# Patient Record
Sex: FEMALE | Race: WHITE | Employment: UNEMPLOYED | URBAN - METROPOLITAN AREA
[De-identification: names, ages, dates, MRNs, and addresses within clinical notes are randomized per-mention and may not be internally consistent; named-entity substitution may affect disease eponyms.]

---
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
## 2022-07-11 ENCOUNTER — TELEPHONE (OUTPATIENT)
Dept: SURGERY | Age: 22
End: 2022-07-11

## 2022-07-11 ENCOUNTER — VIRTUAL VISIT (OUTPATIENT)
Dept: FAMILY MEDICINE CLINIC | Age: 22
End: 2022-07-11
Payer: OTHER GOVERNMENT

## 2022-07-11 DIAGNOSIS — M26.623 BILATERAL TEMPOROMANDIBULAR JOINT PAIN: Primary | ICD-10-CM

## 2022-07-11 DIAGNOSIS — N94.6 DYSMENORRHEA: ICD-10-CM

## 2022-07-11 DIAGNOSIS — Z80.3 FAMILY HISTORY OF BREAST CANCER: ICD-10-CM

## 2022-07-11 PROCEDURE — 99214 OFFICE O/P EST MOD 30 MIN: CPT | Performed by: NURSE PRACTITIONER

## 2022-07-11 RX ORDER — LEVONORGESTREL AND ETHINYL ESTRADIOL 0.1-0.02MG
1 KIT ORAL DAILY
Qty: 3 DOSE PACK | Refills: 1 | Status: SHIPPED | OUTPATIENT
Start: 2022-07-11 | End: 2022-09-09 | Stop reason: SDUPTHER

## 2022-07-11 RX ORDER — IBUPROFEN 800 MG/1
800 TABLET ORAL
Qty: 45 TABLET | Refills: 1 | Status: SHIPPED | OUTPATIENT
Start: 2022-07-11

## 2022-07-11 NOTE — PATIENT INSTRUCTIONS
Temporomandibular Disorder: Care Instructions  Overview     Temporomandibular disorders (TMDs) are problems with the muscles and joints that connect your jaw to your skull. The disorders cause pain when you talk, chew, swallow, or yawn. You may feel this pain on one or both sides. TMDs are often caused by tight jaw muscles. The tightness can be caused by clenching or grinding your teeth. This may happen when you have a lot of stress in your life. If you lower your stress, you may be able to stop clenching or grinding your teeth. This will help relax your jaw and reduce your pain. Your doctor may suggest a dental splint. Splints can help reduce teeth grinding and clenching. You may also be able to do some things at home to feel better. But if none of this works, your doctor may prescribe medicine to help relax your muscles and control the pain. Follow-up care is a key part of your treatment and safety. Be sure to make and go to all appointments, and call your doctor if you are having problems. It's also a good idea to know your test results and keep a list of the medicines you take. How can you care for yourself at home? · Put either an ice pack or a warm, moist cloth on your jaw for 15 minutes several times a day. You can try switching back and forth between moist heat and cold. · Make eating easy on your jaw. Choose softer foods that are easy to chew like eggs, yogurt, or soup. Avoid hard foods that cause your jaws to work very hard. Try cutting your food into small pieces. And if your jaw gets too painful to chew, or if it locks, you may need to puree your food for a while. · To relax your jaw, repeat this exercise for a few minutes every morning and evening. Watch yourself in a mirror. Gently open and close your mouth. Move your jaw straight up and down. But don't do this if it makes your pain worse. · Manage stress. You may be clenching or tightening your muscles when you are under stress.   · Get at least 30 minutes of exercise on most days of the week to relieve stress. Walking is a good choice. · Ask your doctor if you can take an over-the-counter pain medicine, such as acetaminophen (Tylenol), ibuprofen (Advil, Motrin), or naproxen (Aleve). Be safe with medicines. Read and follow all instructions on the label. · Use good posture for sitting and standing. Slumping your shoulders disturbs the alignment of your facial bones and muscles. · Don't:  ? Hold a phone between your shoulder and your jaw. ? Open your mouth all the way, like when you sing loudly or yawn. ? Clench or grind your teeth, bite your lips, or chew your fingernails. ? Clench things such as pens, pipes, or cigars between your teeth. When should you call for help? Call your doctor now or seek immediate medical care if:    · Your jaw is locked open or shut or it is hard to move your jaw. Watch closely for changes in your health, and be sure to contact your doctor if:    · Your jaw pain gets worse.     · Your face is swollen.     · You do not get better as expected. Where can you learn more? Go to http://www.gray.com/  Enter P868 in the search box to learn more about \"Temporomandibular Disorder: Care Instructions. \"  Current as of: June 30, 2021               Content Version: 13.2  © 8233-0242 [employer]. Care instructions adapted under license by [employer] (which disclaims liability or warranty for this information). If you have questions about a medical condition or this instruction, always ask your healthcare professional. [place] [address] any warranty or liability for your use of this information.

## 2022-07-11 NOTE — PROGRESS NOTES
Perfecto Humphrey is a 23 y.o. female who was seen by synchronous (real-time) audio-video technology on 7/11/2022 for Establish Care and Medication Evaluation        Assessment & Plan:   Diagnoses and all orders for this visit:    1. Bilateral temporomandibular joint pain  Ibuprofen as needed  Schedule assessment with dentist as planned  -     ibuprofen (MOTRIN) 800 mg tablet; Take 1 Tablet by mouth every eight (8) hours as needed for Pain. 2. Dysmenorrhea  start low-dose combination oral contraceptive, counseled since we are starting this midcycle she will need to use a backup method such as a condom during intercourse for the next 3 weeks. Counseled she may also noticed increased spotting or breakthrough bleeding with midcycle start  Counseled on ACHES, need for urgent medical attention if these occur  -     levonorgestrel-ethinyl estradiol (Moustapha Heater, LESSINA) 0.1-20 mg-mcg tab; Take 1 Tablet by mouth daily. 3. Family history of breast cancer  Discussed with patient mammogram assessments are not recommended for patients under 30  Discussed she is at relatively low risk for breast cancer due to age, no first-degree relative with cancer. Patient is very concerned about her risk and would like a risk assessment. Will refer to breast specialist for this. -     REFERRAL TO BREAST SURGERY        Follow-up and Dispositions    · Return in about 3 months (around 10/11/2022), or if symptoms worsen or fail to improve, for Dysmenorrhea. I have discussed the diagnosis with the patient and the intended plan as seen in the above orders, and questions were answered concerning future plans. Patient conveyed understanding of the plan at the time of the visit. 712  Subjective:     HPI:    Presents to reestablish care and for evaluation of dysmenorrhea, TMJ pain, and family history of breast cancer. Patient is recently moved to the area from [place].     Complains of dysmenorrhea, with severe cramping during her period. She is previously been on combination oral contraceptives for this and found them to be very effective in the management of her symptoms. She is unsure of the name of what she is taken previously. She ran out of pills about 3 months ago. She is currently a non-smoker. No history of liver disease, DVT, PE, or migraine with aura. LMP 6/28/2022. Patient notes she has not had intercourse since her last period and does not plan to be sexually active in the next several weeks. Patient requesting a refill of ibuprofen 800 mg tablets prescribed by her previous PCP for TMJ pain. Notes bilateral TMJ pain but notes the right side is significantly worse than the left. She is in the process of obtaining an appointment with a dentist for further treatment. Patient reports 2 family members have recently been diagnosed with breast cancer, one being a maternal aunt, and the other a more relative. Patient requesting a mammogram to assess for cancer risk. Denies any abnormalities of the breasts such as palpable masses, changes in the skin, nipple discharge, or breast pain. No history of cancer in first-degree relatives. Prior to Admission medications    Medication Sig Start Date End Date Taking? Authorizing Provider   levonorgestrel-ethinyl estradiol (AVIANE, ALESSE, LESSINA) 0.1-20 mg-mcg tab Take 1 Tablet by mouth daily. 7/11/22  Yes Dylan Hernández NP   ibuprofen (MOTRIN) 800 mg tablet Take 1 Tablet by mouth every eight (8) hours as needed for Pain. 7/11/22  Yes Dylan Hernández NP   buPROPion SR (WELLBUTRIN SR) 150 mg SR tablet Take 150 mg by mouth daily. Yes Lalo, MD Temo   lamoTRIgine (LaMICtal) 200 mg tablet Take 1 Tab by mouth daily. 5/8/20  Yes Dylan Hernández NP   traZODone (DESYREL) 50 mg tablet Take 1 Tab by mouth nightly. Patient taking differently: Take 100 mg by mouth nightly.  5/8/20  Yes Dylan Hernández NP   escitalopram oxalate (Lexapro) 20 mg tablet Take 1 Tab by mouth daily. 5/8/20  Yes Dylan Hernández Q, NP   hydrOXYzine HCL (ATARAX) 25 mg tablet Take 1 Tab by mouth three (3) times daily as needed for Anxiety. 5/8/20  Yes Dylan Hernández, NP   ibuprofen (MOTRIN) 600 mg tablet Take 1 Tablet by mouth every six (6) hours as needed for Pain. 7/8/21 7/11/22  Priscila Winters MD   topiramate (TOPAMAX) 25 mg tablet Take 3 Tablets by mouth nightly. Patient not taking: Reported on 7/11/2022 5/27/21 7/11/22  Gold Barnes MD   diclofenac potassium (CATAFLAM) 50 mg tablet Take 1 tab at onset of headache. May take another 1 tab in 2 hrs if needed. Patient not taking: Reported on 7/11/2022 12/8/20 7/11/22  Gold Barnes MD   Lisdexamfetamine (Vyvanse) 40 mg capsule Take 1 Cap by mouth every morning. Max Daily Amount: 40 mg. Patient not taking: Reported on 7/11/2022 6/8/20 7/11/22  Shiv Lin NP   magnesium oxide 400 mg magnesium tab Take 400 mg by mouth daily. Patient not taking: Reported on 6/9/2021 5/8/20 7/11/22  Shiv Lin NP     There is no problem list on file for this patient. No Known Allergies  Past Medical History:   Diagnosis Date    ADHD     Anxiety     Anxiety     Borderline [facility] disorder ([facility]     Depression     Headache     Multiple personality disorder ([facility]     PTSD (post-traumatic stress disorder)     Reactive attachment disorder      History reviewed. No pertinent surgical history. History reviewed. No pertinent family history. Social History     Tobacco Use    Smoking status: Former Smoker    Smokeless tobacco: Never Used   Substance Use Topics    Alcohol use: Not Currently       Review of Systems   Constitutional: Negative for chills, diaphoresis, fever, malaise/fatigue and weight loss. HENT: Negative. Eyes: Negative. Respiratory: Negative. Cardiovascular: Negative. Gastrointestinal: Negative. Genitourinary: Negative. Musculoskeletal: Negative. Skin: Negative.     Neurological: Negative. Endo/Heme/Allergies: Negative. Psychiatric/Behavioral: Negative for hallucinations, substance abuse and suicidal ideas. Objective:   No flowsheet data found. General: alert, cooperative, no distress   Mental  status: normal mood, behavior, speech, dress, motor activity, and thought processes, able to follow commands   HENT: NCAT   Neck: no visualized mass   Resp: no respiratory distress   Neuro: no gross deficits   Skin: no discoloration or lesions of concern on visible areas   Psychiatric: normal affect, consistent with stated mood, no evidence of hallucinations     Additional exam findings:   none    We discussed the expected course, resolution and complications of the diagnosis(es) in detail. Medication risks, benefits, costs, interactions, and alternatives were discussed as indicated. I advised her to contact the office if her condition worsens, changes or fails to improve as anticipated. She expressed understanding with the diagnosis(es) and plan. [facility], was evaluated through a synchronous (real-time) audio-video encounter. The patient (or guardian if applicable) is aware that this is a billable service, which includes applicable co-pays. This Virtual Visit was conducted with patient's (and/or legal guardian's) consent. The visit was conducted pursuant to the emergency declaration under the [address] [place] [address], [address] [place] [address] authority and the [employer] General Act. Patient identification was verified, and a caregiver was present when appropriate.   The patient was located at: Home: Baldemar Rodríguez  The provider was located at: Home:         Lisandra Mcleod NP  7/11/2022

## 2022-07-11 NOTE — TELEPHONE ENCOUNTER
I have attempted to contact patient at number on file regarding referral to be seen in our office. No answer. Left voicemail to contact office to schedule an appointment.

## 2022-07-21 ENCOUNTER — HOSPITAL ENCOUNTER (EMERGENCY)
Age: 22
Discharge: HOME | End: 2022-07-21
Attending: EMERGENCY MEDICINE
Payer: OTHER GOVERNMENT

## 2022-07-21 VITALS
SYSTOLIC BLOOD PRESSURE: 114 MMHG | TEMPERATURE: 99 F | WEIGHT: 154 LBS | HEART RATE: 82 BPM | BODY MASS INDEX: 24.17 KG/M2 | HEIGHT: 67 IN | OXYGEN SATURATION: 100 % | DIASTOLIC BLOOD PRESSURE: 56 MMHG | RESPIRATION RATE: 16 BRPM

## 2022-07-21 DIAGNOSIS — K08.89 PAIN, DENTAL: Primary | ICD-10-CM

## 2022-07-21 PROCEDURE — 99284 EMERGENCY DEPT VISIT MOD MDM: CPT

## 2022-07-21 PROCEDURE — 74011250636 HC RX REV CODE- 250/636: Performed by: EMERGENCY MEDICINE

## 2022-07-21 PROCEDURE — 96372 THER/PROPH/DIAG INJ SC/IM: CPT

## 2022-07-21 RX ORDER — AMOXICILLIN AND CLAVULANATE POTASSIUM 875; 125 MG/1; MG/1
1 TABLET, FILM COATED ORAL 2 TIMES DAILY
Qty: 14 TABLET | Refills: 0 | Status: SHIPPED | OUTPATIENT
Start: 2022-07-21 | End: 2022-07-28

## 2022-07-21 RX ORDER — KETOROLAC TROMETHAMINE 30 MG/ML
15 INJECTION, SOLUTION INTRAMUSCULAR; INTRAVENOUS
Status: COMPLETED | OUTPATIENT
Start: 2022-07-21 | End: 2022-07-21

## 2022-07-21 RX ADMIN — KETOROLAC TROMETHAMINE 15 MG: 30 INJECTION, SOLUTION INTRAMUSCULAR at 18:10

## 2022-07-21 NOTE — ED TRIAGE NOTES
Pt c/o left upper tooth pain x 1 day, denied n/v took ibuprofen w no relief. Cant get into see a dentist til next week. Pain 8/10.

## 2022-07-21 NOTE — ED PROVIDER NOTES
[facility]   82-year-old female who presents to the emergency department due to dental pain. She woke up this morning with dental pain. She says that one of the molars on her top left jaw has been hurting her. She took some ibuprofen without relief of her symptoms. No facial swelling. No sore throat or difficulty swallowing. No fevers. Patient's mother called the dentist today and she cannot get in until next week    Past Medical History:   Diagnosis Date    ADHD     Anxiety     Anxiety     Borderline personality disorder ([facility]     Depression     Headache     [facility] ([facility]     PTSD (post-traumatic stress disorder)     Reactive attachment disorder        No past surgical history on file. No family history on file. Social History     Socioeconomic History    Marital status: SINGLE     Spouse name: Not on file    Number of children: Not on file    Years of education: Not on file    Highest education level: Not on file   Occupational History    Not on file   Tobacco Use    Smoking status: Never    Smokeless tobacco: Current   Vaping Use    Vaping Use: Never used   Substance and Sexual Activity    Alcohol use: Not Currently    Drug use: Not Currently     Types: Marijuana    Sexual activity: Not on file   Other Topics Concern    Not on file   Social History Narrative    Not on file     Social Determinants of Health     Financial Resource Strain: Not on file   Food Insecurity: Not on file   Transportation Needs: Not on file   Physical Activity: Not on file   Stress: Not on file   Social Connections: Not on file   Intimate Partner Violence: Not on file   Housing Stability: Not on file         ALLERGIES: Patient has no known allergies.     Review of Systems  All systems reviewed are negative unless otherwise documented in the [facility]    Vitals:    07/21/22 1752 07/21/22 1753   BP: (!) 114/56    Pulse: 82    Resp: 16    Temp: 99 °F (37.2 °C)    SpO2: 100%    Weight:  69.9 kg (154 lb)   Height:  5' 7\" (1.702 m)            Physical Exam  Constitutional:       Comments: Resting comfortably no acute distress. HENT:      Head:      Comments: No facial swelling appreciated     Mouth/Throat:      Comments: Moist mucous membranes. No oropharyngeal swelling. No appreciable gingival swelling in the top left part of the gums. Tenderness to palpation in the top left most posterior molar. Overall good dentition. No swelling under the tongue  Eyes:      General: No scleral icterus. Extraocular Movements: Extraocular movements intact. Neck:      Comments: No tenderness of the anterior neck  Cardiovascular:      Comments: Regular rate and rhythm  Pulmonary:      Effort: Pulmonary effort is normal. No respiratory distress. Musculoskeletal:         General: No deformity. Normal range of motion. Cervical back: Normal range of motion. Skin:     General: Skin is warm and dry. Neurological:      Comments: Awake and alert. Speech is normal.  GCS 15        MDM  79-year-old female who presents with the above chief complaint. Vitals are stable. No facial or oropharyngeal swelling appreciated. No obvious signs of dental abscess. She has tenderness to percussion in the top left most posterior molar. I offered a dental block but the patient and mother declined. She will be prescribed Augmentin. She will be keeping her appointment with her dentist and I encouraged her to return with any new or worsening symptoms.        Procedures

## 2022-07-21 NOTE — DISCHARGE INSTRUCTIONS
Please keep your appointment with your dentist for next week. Take the antibiotics as prescribed. You can take Tylenol or ibuprofen going forward as needed for pain.   Return to the emergency department for any concerns

## 2022-07-28 ENCOUNTER — OFFICE VISIT (OUTPATIENT)
Dept: SURGERY | Age: 22
End: 2022-07-28
Payer: OTHER GOVERNMENT

## 2022-07-28 VITALS
BODY MASS INDEX: 24.8 KG/M2 | HEIGHT: 67 IN | TEMPERATURE: 97.2 F | WEIGHT: 158 LBS | HEART RATE: 68 BPM | SYSTOLIC BLOOD PRESSURE: 99 MMHG | DIASTOLIC BLOOD PRESSURE: 54 MMHG

## 2022-07-28 DIAGNOSIS — Z80.3 FAMILY HISTORY OF BREAST CANCER: Primary | ICD-10-CM

## 2022-07-28 PROCEDURE — 99203 OFFICE O/P NEW LOW 30 MIN: CPT | Performed by: NURSE PRACTITIONER

## 2022-07-28 NOTE — PROGRESS NOTES
HISTORY OF PRESENT ILLNESS  Mary Looney is a 23 y.o. female. HPI New patient presents for evaluation of her family history of breast cancer. Denies breast mass, skin changes, nipple discharge and pain. Breast history -   Referring - Alysha Adrian NP  No history of breast biopsies    Family history -   Maternal great grandmother - breast cancer in her 62s  Maternal great aunt - breast cancer in her 62s      OB History    No obstetric history on file. Obstetric Comments   Menarche 17, LMP 7/1/22, # of children 0, age of 1st delivery -, Hysterectomy/oophorectomy no/no, Breast bx no, history of breast feeding no, BCP yes, Hormone therapy no                  ROS    Physical Exam  Deferred - no breast concerns today    ASSESSMENT and PLAN    ICD-10-CM ICD-9-CM    1. Family history of breast cancer  Z80.3 V16.3         PE deferred - no breast concerns. We reviewed the patient's history and her risk. The patient's risk of breast cancer was calculated using the [address] [place] [address] Street. Her 10 year risk is 0.1% (compared with a population risk of 0.1%). Her lifetime risk is 16.7% (compared with a population risk of 13.4%). Discussed that most high risk screening does not start until age 22 and that at this point the recommendation would be for her to start breast screening at age 38 although that could change the interim based on her personal or family history. We reviewed genetic testing possibilities focusing on panel testing and on specific breast cancer causing genes.  We discussed:  - Possible results (positive for a mutation, variant of unknown significance and negative for a mutation) and reliability of these results  - What these results mean in terms of her personal risk of breast, ovarian and other cancers  - Treatment to decrease her risk (mastectomy and oophorectomy and endocrine therapy)  - Increased monitoring for cancer detection (breast imaging with mammography and MRI)  - Effect these results would have on family members (testing of children and other members of the affected side of the family)  - Logistics of testing  - Results in the context of ability to obtain health and life insurance with these results  All the patient's questions and concerns were addressed - will no proceed with genetic testing at this time. Aware that she can have this done at a later date and that she would likely need to pay out of pocket for it. Will follow-up if she would like testing done. Continue breast care with PCP/GYN and follow-up here PRN. She is comfortable with this plan. All questions answered and she stated understanding. Total time spent for this patient - 30 minutes.

## 2022-08-21 NOTE — PROGRESS NOTES
Encounter Date: 8/21/2022       History     Chief Complaint   Patient presents with    Abdominal Pain     Patient reports abdominal pain that started around 4 AM, states made self throw up, denies N/D     43-year-old male with past medical history of lactose intolerance presents to ED for acute onset generalized abdominal pain with associated nausea and emesis that started at 4:00 a.m. this morning.  Patient reports 1.5 episodes of emesis today.  Patient attempted Pepto-Bismol and Gas-X at 7:00 a.m. this morning with mild relief.  Patient reports eating only milk and cookies yesterday, and he states that he is lactose intolerant.  Patient denies any hematemesis, diarrhea, fever, chills, chest pain, shortness of breath, cough, dysuria, or hematuria.  No other symptoms reported.    The history is provided by the patient. No  was used.     Review of patient's allergies indicates:  No Known Allergies  History reviewed. No pertinent past medical history.  Past Surgical History:   Procedure Laterality Date    APPENDECTOMY      TOE SURGERY      ingrown toenail     History reviewed. No pertinent family history.  Social History     Tobacco Use    Smoking status: Never Smoker    Smokeless tobacco: Never Used   Substance Use Topics    Alcohol use: No     Alcohol/week: 0.0 standard drinks    Drug use: No     Review of Systems   Constitutional: Negative for chills and fever.   HENT: Negative for congestion, ear pain, rhinorrhea and sore throat.    Eyes: Negative for redness.   Respiratory: Negative for cough and shortness of breath.    Cardiovascular: Negative for chest pain.   Gastrointestinal: Positive for abdominal pain, nausea and vomiting. Negative for diarrhea.   Genitourinary: Negative for decreased urine volume, difficulty urinating, dysuria, frequency, hematuria and urgency.   Musculoskeletal: Negative for back pain and neck pain.   Skin: Negative for rash.   Neurological: Negative for  Spoke with Marjorie () regarding possible RTC options.  Marjorie stated that this would have to be coordinated through the [facility].  They (the [facility]) pays for room and board and  pays for everything else.  She suggested author contact the  department [phone].  Marjorie also stated that she should have RTC benefits.    Call made to Rosie to inquire about openings.    headaches.   Psychiatric/Behavioral: Negative for confusion.       Physical Exam     Initial Vitals   BP Pulse Resp Temp SpO2   08/21/22 0853 08/21/22 0852 08/21/22 0852 08/21/22 0852 08/21/22 0852   (!) 132/95 92 20 98.6 °F (37 °C) 97 %      MAP       --                Physical Exam    Nursing note and vitals reviewed.  Constitutional: Vital signs are normal. He appears well-developed and well-nourished. He is active.  Non-toxic appearance. No distress.   HENT:   Head: Normocephalic and atraumatic.   Mouth/Throat: Mucous membranes are normal.   Eyes: EOM are normal.   Neck: Trachea normal. Neck supple.   Cardiovascular: Normal rate and regular rhythm.   Pulmonary/Chest: Breath sounds normal. No respiratory distress.   Abdominal: Abdomen is soft. Bowel sounds are normal. He exhibits no distension. There is no abdominal tenderness. There is no rebound and no guarding.   Musculoskeletal:         General: No edema. Normal range of motion.      Cervical back: Neck supple.     Neurological: He is alert.   Skin: Skin is warm, dry and intact.   Psychiatric: He has a normal mood and affect.         ED Course   Procedures  Labs Reviewed   CBC W/ AUTO DIFFERENTIAL - Abnormal; Notable for the following components:       Result Value    Gran % 80.9 (*)     Lymph % 14.0 (*)     All other components within normal limits   COMPREHENSIVE METABOLIC PANEL - Abnormal; Notable for the following components:    Glucose 187 (*)     All other components within normal limits   URINALYSIS, REFLEX TO URINE CULTURE - Abnormal; Notable for the following components:    Appearance, UA Hazy (*)     Protein, UA Trace (*)     Glucose, UA 1+ (*)     Ketones, UA Trace (*)     Urobilinogen, UA 2.0-3.0 (*)     All other components within normal limits    Narrative:     Specimen Source->Urine   LIPASE          Imaging Results          US Abdomen Limited (Final result)  Result time 08/21/22 14:07:49    Final result by Rodo Hernandez MD (08/21/22  14:07:49)                 Impression:      Cholelithiasis without compelling evidence of acute cholecystitis or biliary obstruction.    Diffuse increased hepatic parenchymal echogenicity suggesting an infiltrative process such as steatosis.  No focal lesions.      Electronically signed by: Rodo Hernandez MD  Date:    08/21/2022  Time:    14:07             Narrative:    EXAMINATION:  US ABDOMEN LIMITED    CLINICAL HISTORY:  epigastric;    TECHNIQUE:  Limited ultrasound of the right upper quadrant of the abdomen  was performed.    COMPARISON:  None.    FINDINGS:  Liver: Normal in size, measuring 16.9 cm. Diffuse increased hepatic parenchymal echogenicity.  No focal hepatic lesions.    Gallbladder: Several small gallstones.  No wall thickening, hyperemia or pericholecystic fluid.  No sonographic Silver's sign.    Biliary system: The common duct is not dilated, measuring 3 mm.  No intrahepatic ductal dilatation.    Pancreas: Obscured by overlying bowel gas.    Spleen: Unremarkable, measuring 8.9 cm.    Miscellaneous: No upper abdominal ascites.                                 Medications   sodium chloride 0.9% bolus 1,000 mL (0 mLs Intravenous Stopped 8/21/22 1153)   dicyclomine injection 20 mg (20 mg Intramuscular Given 8/21/22 1053)   ondansetron injection 4 mg (4 mg Intravenous Given 8/21/22 1053)   famotidine (PF) injection 20 mg (20 mg Intravenous Given 8/21/22 1053)   aluminum-magnesium hydroxide-simethicone 200-200-20 mg/5 mL suspension 30 mL (30 mLs Oral Given 8/21/22 1240)     And   LIDOcaine HCl 2% oral solution 15 mL (15 mLs Oral Given 8/21/22 1240)   morphine injection 4 mg (4 mg Intravenous Given 8/21/22 1312)     Medical Decision Making:   Clinical Tests:   Lab Tests: Ordered and Reviewed  Radiological Study: Ordered and Reviewed  ED Management:  This is a 43-year-old male with past medical history of lactose intolerance presents to ED for acute onset generalized abdominal pain with associated nausea and  emesis that started at 4:00 a.m. this morning.  On physical exam, patient is well-appearing and in no acute distress.  Nontoxic appearing.  Lungs are clear to auscultation bilaterally.  Abdomen is soft and nontender.  No guarding, rigidity, rebound.  Pepcid, fluids, Zofran, and Bentyl ordered.  Will reassess.  Upon reassessment, patient reports only mild relief to pain. GI cocktail ordered.  CBC and CMP unremarkable.  Doubt electrolyte imbalance.  Lipase within normal limits.  Doubt pancreatitis.  UA unremarkable.  Doubt cystitis.  Upon reassessment, patient reports worsening pain to epigastrium.  Morphine ordered.  Right upper quadrant ultrasound ordered.  Will reassess.  Upon reassessment, patient reports relief to his pain. US revealed Cholelithiasis without compelling evidence of acute cholecystitis or biliary obstruction. Diffuse increased hepatic parenchymal echogenicity suggesting an infiltrative process such as steatosis.  No focal lesions. Will discharge patient on ibuprofen and zofran as needed for pain and nausea. Ambulatory referral to gen surgery ordered. Urged prompt f/u with PCP for further evaluations.    Strict return precautions given. I discussed with the patient/family the diagnosis, treatment plan, indications for return to the emergency department, and for expected follow-up. The patient/family verbalized an understanding. The patient/family is asked if there are any questions or concerns. We discuss the case, until all issues are addressed to the patient/familys satisfaction. Patient/family understands and is agreeable to the plan. Patient is stable and ready for discharge.                       Clinical Impression:   Final diagnoses:  [K80.20] Calculus of gallbladder without cholecystitis without obstruction (Primary)  [R11.2] Non-intractable vomiting with nausea, unspecified vomiting type          ED Disposition Condition    Discharge Stable        ED Prescriptions     Medication Sig  Dispense Start Date End Date Auth. Provider    ibuprofen (ADVIL,MOTRIN) 400 MG tablet Take 1 tablet (400 mg total) by mouth every 6 (six) hours as needed for Other (pain). 20 tablet 8/21/2022  Asael Jackson PA-C    ondansetron (ZOFRAN-ODT) 4 MG TbDL Take 1 tablet (4 mg total) by mouth every 6 (six) hours as needed (nausea). 20 tablet 8/21/2022  Asael Jackson PA-C        Follow-up Information     Follow up With Specialties Details Why Contact Info    Hieu Hastings MD Family Medicine In 2 days for further evaluation [address] [facility] [address] [phone]      [facility] - Emergency Dept Emergency Medicine In 2 days If symptoms worsen [address]  [facility] [address] [phone]           Asael Jackson PA-C  08/21/22 7332

## 2022-08-30 ENCOUNTER — HOSPITAL ENCOUNTER (EMERGENCY)
Age: 22
Discharge: HOME | End: 2022-08-30
Attending: STUDENT IN AN ORGANIZED HEALTH CARE EDUCATION/TRAINING PROGRAM
Payer: OTHER GOVERNMENT

## 2022-08-30 ENCOUNTER — APPOINTMENT (OUTPATIENT)
Dept: GENERAL RADIOLOGY | Age: 22
End: 2022-08-30
Attending: STUDENT IN AN ORGANIZED HEALTH CARE EDUCATION/TRAINING PROGRAM
Payer: OTHER GOVERNMENT

## 2022-08-30 VITALS
DIASTOLIC BLOOD PRESSURE: 61 MMHG | RESPIRATION RATE: 16 BRPM | HEART RATE: 91 BPM | HEIGHT: 67 IN | BODY MASS INDEX: 25.11 KG/M2 | OXYGEN SATURATION: 100 % | WEIGHT: 160 LBS | SYSTOLIC BLOOD PRESSURE: 101 MMHG | TEMPERATURE: 99.1 F
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
## 2022-08-30 PROCEDURE — 90714 TD VACC NO PRESV 7 YRS+ IM: CPT | Performed by: STUDENT IN AN ORGANIZED HEALTH CARE EDUCATION/TRAINING PROGRAM

## 2022-08-30 PROCEDURE — 90471 IMMUNIZATION ADMIN: CPT

## 2022-08-30 PROCEDURE — 99284 EMERGENCY DEPT VISIT MOD MDM: CPT

## 2022-08-30 PROCEDURE — 73590 X-RAY EXAM OF LOWER LEG: CPT

## 2022-08-30 PROCEDURE — 74011000250 HC RX REV CODE- 250: Performed by: STUDENT IN AN ORGANIZED HEALTH CARE EDUCATION/TRAINING PROGRAM

## 2022-08-30 PROCEDURE — 74011250636 HC RX REV CODE- 250/636: Performed by: STUDENT IN AN ORGANIZED HEALTH CARE EDUCATION/TRAINING PROGRAM

## 2022-08-30 PROCEDURE — 74011250637 HC RX REV CODE- 250/637: Performed by: STUDENT IN AN ORGANIZED HEALTH CARE EDUCATION/TRAINING PROGRAM

## 2022-08-30 RX ORDER — HYDROCODONE BITARTRATE AND ACETAMINOPHEN 5; 325 MG/1; MG/1
1 TABLET ORAL
Status: COMPLETED | OUTPATIENT
Start: 2022-08-30 | End: 2022-08-30

## 2022-08-30 RX ORDER — BACITRACIN 500 UNIT/G
3 PACKET (EA) TOPICAL 3 TIMES DAILY
Status: DISCONTINUED | OUTPATIENT
Start: 2022-08-30 | End: 2022-08-30

## 2022-08-30 RX ORDER — AMOXICILLIN AND CLAVULANATE POTASSIUM 875; 125 MG/1; MG/1
1 TABLET, FILM COATED ORAL
Status: COMPLETED | OUTPATIENT
Start: 2022-08-30 | End: 2022-08-30

## 2022-08-30 RX ORDER — BACITRACIN 500 [USP'U]/G
OINTMENT TOPICAL 3 TIMES DAILY
Qty: 14 G | Refills: 0 | Status: SHIPPED | OUTPATIENT
Start: 2022-08-30 | End: 2022-09-09

## 2022-08-30 RX ORDER — BACITRACIN 500 UNIT/G
3 PACKET (EA) TOPICAL
Status: COMPLETED | OUTPATIENT
Start: 2022-08-30 | End: 2022-08-30

## 2022-08-30 RX ORDER — AMOXICILLIN AND CLAVULANATE POTASSIUM 875; 125 MG/1; MG/1
1 TABLET, FILM COATED ORAL 2 TIMES DAILY
Qty: 14 TABLET | Refills: 0 | Status: SHIPPED | OUTPATIENT
Start: 2022-08-30 | End: 2022-09-06

## 2022-08-30 RX ADMIN — HYDROCODONE BITARTRATE AND ACETAMINOPHEN 1 TABLET: 5; 325 TABLET ORAL at 10:34

## 2022-08-30 RX ADMIN — TETANUS AND DIPHTHERIA TOXOIDS ADSORBED 0.5 ML: 2; 2 INJECTION INTRAMUSCULAR at 10:35

## 2022-08-30 RX ADMIN — Medication 3 PACKET: at 12:05

## 2022-08-30 RX ADMIN — AMOXICILLIN AND CLAVULANATE POTASSIUM 1 TABLET: 875; 125 TABLET, FILM COATED ORAL at 10:34

## 2022-08-30 NOTE — ED TRIAGE NOTES
Pt reports she was walking home from the store when a dog ran up and bit her. Pt has bites to the left upper thigh, right shin, right calf, and left hand. Pt unknown if dog it UTD on shots.

## 2022-08-30 NOTE — ED NOTES
Animal Control called by this nurse left information with Ms. Koehler Sessions and call back number in case of more needed information. Will fax info to animal control as well.

## 2022-08-30 NOTE — ED PROVIDER NOTES
The patient is a 82-year-old female history of psychiatric illness presenting today secondary to a dog bite. Just prior to arrival she was walking home when she sustained an unprovoked dog bite from a pitbull. She was able to get the owner's information, uncertain of the vaccine status of the dog. Unsure when her last tetanus shot was. She has bites to her right calf, left thigh and left hand. She has severe pain to the right calf and left thigh. No head or neck injury. No other complaints at this time. Past Medical History:   Diagnosis Date    ADHD     Anxiety     Anxiety     Borderline personality disorder ([facility] [address]     Depression     Headache     Multiple personality disorder (HCC)     PTSD (post-traumatic stress disorder)     Reactive attachment disorder          Social History     Socioeconomic History    Marital status: SINGLE     Spouse name: Not on file    Number of children: Not on file    Years of education: Not on file    Highest education level: Not on file   Occupational History    Not on file   Tobacco Use    Smoking status: Never    Smokeless tobacco: Current   Vaping Use    Vaping Use: Never used   Substance and Sexual Activity    Alcohol use: Not Currently    Drug use: Not Currently     Types: Marijuana    Sexual activity: Not on file   Other Topics Concern    Not on file   Social History Narrative    Not on file     Social Determinants of Health     Financial Resource Strain: Not on file   Food Insecurity: Not on file   Transportation Needs: Not on file   Physical Activity: Not on file   Stress: Not on file   Social Connections: Not on file   Intimate Partner Violence: Not on file   Housing Stability: Not on file         ALLERGIES: Patient has no known allergies. Review of Systems   Constitutional:  Negative for chills and fever. HENT:  Negative for congestion and rhinorrhea. Eyes:  Negative for redness and visual disturbance.    Respiratory:  Negative for cough and shortness of breath. Cardiovascular:  Negative for chest pain and leg swelling. Gastrointestinal:  Negative for abdominal pain, diarrhea, nausea and vomiting. Genitourinary:  Negative for dysuria, flank pain, frequency, hematuria and urgency. Musculoskeletal:  Positive for myalgias. Negative for arthralgias, back pain and neck pain. Skin:  Positive for wound. Negative for rash. Allergic/Immunologic: Negative for immunocompromised state. Neurological:  Negative for dizziness and headaches. Vitals:    08/30/22 1011 08/30/22 1102 08/30/22 1206   BP: 123/82 (!) 108/59 101/61   Pulse: 91     Resp: 16     Temp: 99.1 °F (37.3 °C)     SpO2: 98% 100%    Weight: 72.6 kg (160 lb)     Height: 5' 7\" (1.702 m)              Physical Exam  Constitutional:       General: She is not in acute distress. Appearance: She is well-developed. HENT:      Head: Normocephalic. Eyes:      Conjunctiva/sclera: Conjunctivae normal.   Cardiovascular:      Rate and Rhythm: Normal rate. Comments: Palpable bilateral radial pulses  Palpable bilateral DP and PT pulses  Pulmonary:      Effort: Pulmonary effort is normal. No respiratory distress. Musculoskeletal:         General: Normal range of motion. Cervical back: Normal range of motion. Skin:     General: Skin is warm and dry. Capillary Refill: Capillary refill takes less than 2 seconds. Comments: Multiple puncture wounds consistent with dog bite with associated ecchymosis to the right calf as well as left posterior thigh. There are 2 very superficial wounds to the dorsum of the hand on the left side. Please see images below. There is bony tenderness to the right tibial region however no other bony tenderness associated with wounds. No wounds requiring repair. Neurological:      General: No focal deficit present. Mental Status: She is oriented to person, place, and time.       Comments: Motor and sensory intact in bilateral feet and hands   Psychiatric: Behavior: Behavior normal.        L hand      L posterior thigh        R lower leg      R lower leg    MDM         Procedures        Wounds irrigated and cleansed with Betadine by RN  Greta updated  Norco given for pain  Augmentin given for antibiotic prophylaxis  Wounds were dressed with bacitracin and dry dressings  Animal control contacted, to follow-up with patient  X-ray of the right tibia negative      27-year-old female presents today after sustaining multiple dog bites from a pit bull that she encountered while walking home earlier today. Wounds were irrigated and cleansed with Betadine. No wounds requiring repair. Treated with Augmentin and she will go home with the same. Animal control has been contacted. She is okay for discharge home. I went over signs and symptoms of wound infection with both mom and patient. ICD-10-CM ICD-9-CM    1. Dog bite, initial encounter  W54. 0XXA 879.8      E906.0         EMILY Villatoro,

## 2022-09-04 ENCOUNTER — HOSPITAL ENCOUNTER (EMERGENCY)
Age: 22
Discharge: HOME | End: 2022-09-04
Attending: EMERGENCY MEDICINE
Payer: OTHER GOVERNMENT

## 2022-09-04 VITALS
DIASTOLIC BLOOD PRESSURE: 75 MMHG | HEIGHT: 67 IN | BODY MASS INDEX: 25.11 KG/M2 | SYSTOLIC BLOOD PRESSURE: 107 MMHG | WEIGHT: 160 LBS | RESPIRATION RATE: 18 BRPM | HEART RATE: 93 BPM | TEMPERATURE: 98 F | OXYGEN SATURATION: 97 %

## 2022-09-04 DIAGNOSIS — Z51.89 VISIT FOR WOUND CHECK: Primary | ICD-10-CM

## 2022-09-04 PROCEDURE — 99282 EMERGENCY DEPT VISIT SF MDM: CPT

## 2022-09-04 NOTE — ED TRIAGE NOTES
Pt arrives from home with c/o possible skin infection from a dog bite that occurred on Tuesday. Pt reports the bite site on the back of her left shin being red with discharge. Pt denies any fevers. Pt reports taking Amoxicillin and using a topical antibiotic ointment at home.

## 2022-09-04 NOTE — ED PROVIDER NOTES
The history is provided by the patient. Skin Problem   This is a recurrent problem. Episode onset: 6 days ago. The problem has not changed since onset. Associated with: recent dog bite to right lower leg. There has been no fever. Affected Location: right lower leg. The pain is moderate. The pain has been Constant since onset. Associated symptoms include weeping. She has tried antibiotic for the symptoms. Past Medical History:   Diagnosis Date    ADHD     Anxiety     Anxiety     Borderline personality disorder ([address]     Depression     Headache     Multiple personality disorder (HCC)     PTSD (post-traumatic stress disorder)     Reactive attachment disorder        History reviewed. No pertinent surgical history. History reviewed. No pertinent family history. Social History     Socioeconomic History    Marital status: SINGLE     Spouse name: Not on file    Number of children: Not on file    Years of education: Not on file    Highest education level: Not on file   Occupational History    Not on file   Tobacco Use    Smoking status: Never    Smokeless tobacco: Current   Vaping Use    Vaping Use: Never used   Substance and Sexual Activity    Alcohol use: Not Currently    Drug use: Not Currently     Types: Marijuana    Sexual activity: Not on file   Other Topics Concern    Not on file   Social History Narrative    Not on file     Social Determinants of Health     Financial Resource Strain: Not on file   Food Insecurity: Not on file   Transportation Needs: Not on file   Physical Activity: Not on file   Stress: Not on file   Social Connections: Not on file   Intimate Partner Violence: Not on file   Housing Stability: Not on file         ALLERGIES: Patient has no known allergies. Review of Systems   All other systems reviewed and are negative.     Vitals:    09/04/22 1050   BP: 107/75   Pulse: 93   Resp: 18   Temp: 98 °F (36.7 °C)   SpO2: 97%   Weight: 72.6 kg (160 lb)   Height: 5' 7\" (1.702 m) Physical Exam  Vitals and nursing note reviewed. Constitutional:       General: She is not in acute distress. Appearance: She is well-developed. HENT:      Head: Normocephalic and atraumatic. Eyes:      Conjunctiva/sclera: Conjunctivae normal.   Cardiovascular:      Rate and Rhythm: Normal rate and regular rhythm. Pulmonary:      Effort: Pulmonary effort is normal. No respiratory distress. Abdominal:      General: There is no distension. Musculoskeletal:         General: No deformity. Normal range of motion. Cervical back: Neck supple. Comments: Right lower leg with several granulating puncture wounds, no erythema or induration. Layered ecchymosis of the calf. Skin:     General: Skin is warm and dry. Neurological:      Mental Status: She is alert. Cranial Nerves: No cranial nerve deficit. Psychiatric:         Behavior: Behavior normal.        MDM       53-year-old female presents with right leg wounds from recent dog bite which was treated here appropriately with Augmentin therapy by mouth. Mother had noticed some drainage from the wounds and was concerned for possible developing infection. There is no evidence of cellulitis or abscess on my exam today. I recommended continuing topical and oral antibiotic therapy and she should continue to improve. Other supportive care measures discussed. Return precautions discussed for worsening or new concerning symptoms.     Procedures

## 2022-09-09 ENCOUNTER — OFFICE VISIT (OUTPATIENT)
Dept: FAMILY MEDICINE CLINIC | Age: 22
End: 2022-09-09
Payer: OTHER GOVERNMENT

## 2022-09-09 VITALS
HEART RATE: 66 BPM | OXYGEN SATURATION: 100 % | SYSTOLIC BLOOD PRESSURE: 96 MMHG | BODY MASS INDEX: 24.92 KG/M2 | HEIGHT: 67 IN | TEMPERATURE: 97.7 F | WEIGHT: 158.8 LBS | DIASTOLIC BLOOD PRESSURE: 66 MMHG

## 2022-09-09 DIAGNOSIS — S81.831A PUNCTURE WOUND OF MULTIPLE SITES OF RIGHT LOWER EXTREMITY, INITIAL ENCOUNTER: ICD-10-CM

## 2022-09-09 DIAGNOSIS — S71.132A PUNCTURE WOUND OF LEFT THIGH, INITIAL ENCOUNTER: Primary | ICD-10-CM

## 2022-09-09 DIAGNOSIS — S80.11XA HEMATOMA OF RIGHT LOWER EXTREMITY, INITIAL ENCOUNTER: ICD-10-CM
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
## 2022-09-09 DIAGNOSIS — N94.6 DYSMENORRHEA: ICD-10-CM

## 2022-09-09 DIAGNOSIS — S70.12XA HEMATOMA OF LEFT THIGH, INITIAL ENCOUNTER: ICD-10-CM

## 2022-09-09 PROCEDURE — 99214 OFFICE O/P EST MOD 30 MIN: CPT | Performed by: NURSE PRACTITIONER

## 2022-09-09 RX ORDER — LEVONORGESTREL AND ETHINYL ESTRADIOL 0.1-0.02MG
1 KIT ORAL DAILY
Qty: 3 DOSE PACK | Refills: 3 | Status: SHIPPED | OUTPATIENT
Start: 2022-09-09

## 2022-09-09 NOTE — PROGRESS NOTES
Alf Rodriguez (: 2000) is a 23 y.o. female, established patient, here for evaluation of the following chief complaint(s):  Dog Bite ( F/up occurred last Tuesday, on thighs and calves/shins) and Medication Evaluation (On estrogen )       ASSESSMENT/PLAN:  Below is the assessment and plan developed based on review of pertinent history, physical exam, labs, studies, and medications. 1. Puncture wound of left thigh, initial encounter  2. Hematoma of left thigh, initial encounter  3. Puncture wound of multiple sites of right lower extremity, initial encounter  4. Hematoma of right lower extremity, initial encounter  5. Dog bite, initial encounter  Puncture sites now closed  Extensive bruising/hematoma at site of circumferential bite R lower leg and bite L posterior thigh  Animal control is monitoring the dog to determine if rabies vaccination is needed- patient understands she should return to the ED promptly if vaccination is recommended by animal control  Complete augmentin as prescribed  Wounds may remain uncovered if they are no longer draining  Increase activity as tolerated    6. Dysmenorrhea  Improved  Continue rx  -     levonorgestrel-ethinyl estradiol (AVIANE, ALESSE, LESSINA) 0.1-20 mg-mcg tab; Take 1 Tablet by mouth daily. , Normal, Disp-3 Dose Pack, R-3      Return in about 2 weeks (around 2022) for wound check. I have discussed the diagnosis with the patient and the intended plan as seen in the above orders. The patient has received an after-visit summary and questions were answered concerning future plans. Patient conveyed understanding of the plan at the time of the visit. SUBJECTIVE/OBJECTIVE:  Presents for evaluation of dog bite and follow-up of dysmenorrhea. Accompanied by her mother today. Patient reports she was bitten by a pit bull on  on the right lower leg and left posterior thigh.   The right leg bite is circumferential with the dog having his whole jaw around the lower leg. Animal control and the police are involved. Animal control is monitoring the dog to determine if she will need to have rabies vaccination. Patient was seen in the emergency room on August 30 and again on September fourth. She states that the wounds were cleaned and she was put on Augmentin which she has been taking as prescribed. She has been keeping the wounds covered with nonstick gauze. The posterior calf wounds have some persistent bloody use. She has not noted any purulent drainage or abnormal wound odor. There is significant swelling and bruising of the calf and thigh. She is having difficulty walking due to the pain, pain is aggravated by weightbearing activities. Combination oral contraceptive was started about 2 months ago for treatment of dysmenorrhea. Patient reports her symptoms have improved significantly. She is tolerating the medication well, reports good compliance, and no apparent side effects. Past Medical History:   Diagnosis Date    ADHD     Anxiety     Anxiety     Borderline personality disorder ([facility] [address]     Depression     Headache     Multiple personality disorder (HCC)     PTSD (post-traumatic stress disorder)     Reactive attachment disorder      History reviewed. No pertinent surgical history. History reviewed. No pertinent family history. Social History     Tobacco Use    Smoking status: Never    Smokeless tobacco: Current   Substance Use Topics    Alcohol use: Not Currently         Review of Systems   Constitutional:  Negative for chills and fever. HENT: Negative. Eyes: Negative. Respiratory: Negative. Cardiovascular: Negative. Gastrointestinal: Negative. Endocrine: Negative. Genitourinary: Negative. Musculoskeletal:  Positive for gait problem. Skin:  Positive for wound. Allergic/Immunologic: Negative. Hematological: Negative. Psychiatric/Behavioral: Negative.        Physical Examination: General appearance - alert, well appearing, and in no distress and oriented to person, place, and time  Mental status - normal mood, behavior, speech, dress, motor activity, and thought processes  Eyes - sclera anicteric  Neck - supple, no significant adenopathy, thyroid exam: thyroid is normal in size without nodules or tenderness  Chest - clear to auscultation, no wheezes, rales or rhonchi, symmetric air entry  Heart - normal rate, regular rhythm, normal S1, S2, no murmurs, rubs, clicks or gallops, normal bilateral carotid upstroke without bruits, no JVD  Abdomen - soft, nontender, nondistended, no masses or organomegaly  bowel sounds normal  Neurological - alert, oriented, normal speech, no focal findings or movement disorder noted  Extremities - no pedal edema noted, intact peripheral pulses, no edema, redness or tenderness in the calves or thighs  Skin - Multiple puncture wounds consistent with dog bite with associated ecchymosis to the anterior and posterior right calf as well as left posterior thigh, with significant associated soft tissue swelling/hematoma, tenderness to palpation, and ecchymosis at both sites. Minimal associated surrounding erythema. An electronic signature was used to authenticate this note.   -- Latosha Shields, NP

## 2022-09-09 NOTE — PROGRESS NOTES
Alexander Lanes is a 23 y.o. female , id x 2(name and ). Reviewed record, history, and  medications. Chief Complaint   Patient presents with    Dog Bite      F/up occurred last Tuesday, on thighs and calves/shins    Medication Evaluation     On estrogen        Vitals:    22 0800   BP: 96/66   Pulse: 66   Temp: 97.7 °F (36.5 °C)   SpO2: 100%   Weight: 158 lb 12.8 oz (72 kg)   Height: 5' 7\" (1.702 m)       Coordination of Care Questionnaire:   1. Have you been to the ER, urgent care clinic since your last visit? Hospitalized since your last visit? No    2. Have you seen or consulted any other health care providers outside of the [address] [place] [address] since your last visit? Include any pap smears or colon screening. No      3 most recent PHQ Screens 2022   Little interest or pleasure in doing things Several days   Feeling down, depressed, irritable, or hopeless Nearly every day   Total Score PHQ 2 4   Trouble falling or staying asleep, or sleeping too much -   Feeling tired or having little energy -   Poor appetite, weight loss, or overeating -   Feeling bad about yourself - or that you are a failure or have let yourself or your family down -   Trouble concentrating on things such as school, work, reading, or watching TV -   Moving or speaking so slowly that other people could have noticed; or the opposite being so fidgety that others notice -   Thoughts of being better off dead, or hurting yourself in some way -   PHQ 9 Score -   How difficult have these problems made it for you to do your work, take care of your home and get along with others -       Patient is accompanied by mother I have received verbal consent from Alexander Lanes to discuss any/all medical information while they are present in the room.

## 2022-09-29 ENCOUNTER — OFFICE VISIT (OUTPATIENT)
Dept: FAMILY MEDICINE CLINIC | Age: 22
End: 2022-09-29
Payer: OTHER GOVERNMENT

## 2022-09-29 VITALS
SYSTOLIC BLOOD PRESSURE: 100 MMHG | HEIGHT: 67 IN | TEMPERATURE: 99.2 F | WEIGHT: 159 LBS | BODY MASS INDEX: 24.96 KG/M2 | DIASTOLIC BLOOD PRESSURE: 65 MMHG | RESPIRATION RATE: 18 BRPM | OXYGEN SATURATION: 96 % | HEART RATE: 92 BPM
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
## 2022-09-29 DIAGNOSIS — S81.831D: Primary | ICD-10-CM

## 2022-09-29 PROCEDURE — 99213 OFFICE O/P EST LOW 20 MIN: CPT | Performed by: NURSE PRACTITIONER

## 2022-09-29 RX ORDER — TRAZODONE HYDROCHLORIDE 100 MG/1
TABLET ORAL
COMMUNITY
Start: 2022-09-02

## 2022-09-29 NOTE — PROGRESS NOTES
Chief Complaint   Patient presents with    Wound Check       1. Patient in office today for follow up on wounds that occurred in August.  Pt received animal bite to right lower calf and left thigh. Pt has completed all abx therapy. Drainage from right calf has resolved. Pt does note tenderness when walking. Does note previous injury to knee's which could be contributing to gait problems and tenderness with walking. 1. Have you been to the ER, urgent care clinic since your last visit? Hospitalized since your last visit? No    2. Have you seen or consulted any other health care providers outside of the [address] [place] [address] since your last visit? Include any pap smears or colon screening.  No

## 2022-09-29 NOTE — PROGRESS NOTES
Chief Complaint   Patient presents with    Wound Check     Patient in office today for follow up on wounds that occurred in on 8/30. Pt received pit bull bite to right lower calf and left thigh. Pt has completed all abx therapy. Drainage from right calf has resolved. Pt does note tenderness when walking. Does note previous injury to knee's which could be contributing to gait problems and tenderness with walking. Pain worse with more activity. Comes and goes. Motrin and tylenol seem to help short time. Has been trying to ease back into activity to. Used to walk 1-2 times a day before injury. Denies any ankle swelling. Denies any history of blood clot. Personal or family. Chief Complaint   Patient presents with    Wound Check     she is a 23y.o. year old female who presents for evalution. Reviewed PmHx, RxHx, FmHx, SocHx, AllgHx and updated and dated in the chart. Review of Systems - negative except as listed above in the HPI    Objective:     Vitals:    09/29/22 1417   BP: 100/65   Pulse: 92   Resp: 18   Temp: 99.2 °F (37.3 °C)   TempSrc: Oral   SpO2: 96%   Weight: 159 lb (72.1 kg)   Height: 5' 7\" (1.702 m)     Physical Examination: General appearance - alert, well appearing, and in no distress  Skin - right calf muscle has evidence of healing dog bite, has a golf ball sized hematoma present that is tender, there are 2 puncture wounds present (from teeth on lower jaw) that are not really open or draining but black, tender around the hematoma but cannot feel me touching the central portion, this part of the wound is hard to touch and has a darker discoloration     Assessment/ Plan:   Diagnoses and all orders for this visit:    1. Puncture wound of multiple sites of right lower extremity, subsequent encounter / 2.  Dog bite, subsequent encounter  -     REFERRAL TO WOUND CARE  I recommended pt see wound specialist for further evaluation of this 1 week old dog bite to make sure it is healing normally. Referral to Dr. Elvis Rincon for further eval. Continue tylenol and motrin as needed for pain. Continue easing into normal activity as tolerated. Has also tried epsom soaks. I have discussed the diagnosis with the patient and the intended plan as seen in the above orders. The patient has received an after-visit summary and questions were answered concerning future plans. Medication Side Effects and Warnings were discussed with patient: no  Patient Labs were reviewed and or requested: no  Patient Past Records were reviewed and or requested  yes  Patient / Caregiver Understanding of treatment plan was verbalized during office visit YES    MIGUEL Nina    There are no Patient Instructions on file for this visit.

## 2022-10-06 ENCOUNTER — HOSPITAL ENCOUNTER (OUTPATIENT)
Dept: WOUND CARE | Age: 22
Discharge: HOME | End: 2022-10-06
Payer: OTHER GOVERNMENT

## 2022-10-06 VITALS
TEMPERATURE: 98.1 F | SYSTOLIC BLOOD PRESSURE: 110 MMHG | DIASTOLIC BLOOD PRESSURE: 60 MMHG | RESPIRATION RATE: 18 BRPM | WEIGHT: 164 LBS | HEART RATE: 77 BPM | HEIGHT: 67 IN | BODY MASS INDEX: 25.74 KG/M2
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
## 2022-10-06 PROBLEM — S81.852A DOG BITE OF LEFT CALF: Status: ACTIVE | Noted: 2022-10-06

## 2022-10-06 PROBLEM — S81.851A DOG BITE OF RIGHT CALF: Status: ACTIVE | Noted: 2022-10-06

## 2022-10-06 PROCEDURE — 99202 OFFICE O/P NEW SF 15 MIN: CPT

## 2022-10-06 NOTE — DISCHARGE INSTRUCTIONS
Discharge Instructions for  [facility]  [facility] [address], [address] [address]  Telephone: [phone] [facility] Information: Should you experience any significant changes in your wound(s) or have questions about your wound care, please contact the [facility] at [address] 8:00 am - 4:30. If you need help with your wound outside these hours and cannot wait until we are again available, contact your PCP or go to the hospital emergency room. NAME:  Mackenzie Sumner  YOB: 2000  DATE:  10/6/2022    : Marjorie Sandoval moisturizing lotion to calf two times a day     The skin takes 6 months to 2 years to fully remodel. Scar tissue will be present at the healed site. Numbness may take up to 6 months to resolve itself. Electronically signed on 10/6/2022 at 8:04 AM     PLEASE NOTE: IF YOU ARE UNABLE TO OBTAIN WOUND SUPPLIES, CONTINUE TO USE THE SUPPLIES YOU HAVE AVAILABLE UNTIL YOU ARE ABLE TO [facility]. IT IS MOST IMPORTANT TO KEEP THE WOUND COVERED AT ALL TIMES. Physician Signature:_______________________  Dr. Saniya Mistry treatment has been completed at [facility] outpatient wound clinic on 10/6/2022, per Dr. Evelin Leo. We know patients have several options when choosing a health care provider. We would like to express our sincere appreciation for having had the chance to be yours. More importantly, we enjoy having you as part of our family. We also hope your experience with us has surpassed your expectations and that you have been pleased with our service. We appreciate the confidence you've shown in selecting us as your health care provider and we will continue or commitment to provide the highest quality of service. Again, thank you for choosing us for your health care needs. If you have any further questions or concerns, please call [phone].  It has been our pleasure serving you and we hope to continue our relationship in the future, if the need occurs.      Sincerely,  [address] [facility]

## 2022-10-06 NOTE — WOUND CARE
10/06/22 0902   Right Leg Edema Point of Measurement   Leg circumference 37.5 cm   Ankle circumference 24.5 cm   Left Leg Edema Point of Measurement   Leg circumference 37.5 cm   Ankle circumference 24.5 cm   LLE Peripheral Vascular    Capillary Refill Less than/equal to 3 seconds   Color Appropriate for race   Temperature Warm   Pedal Pulse Palpable   RLE Peripheral Vascular    Capillary Refill Less than/equal to 3 seconds   Color Appropriate for race   Temperature Warm   Pedal Pulse Palpable   Wound Calf Left #1   Date First Assessed/Time First Assessed: 10/06/22 0904   Present on Hospital Admission: Yes  Location: Calf  Wound Location Orientation: Left  Wound Description: #1   Wound Image    Wound Length (cm) 0 cm   Wound Width (cm) 0 cm   Wound Depth (cm) 0 cm   Wound Surface Area (cm^2) 0 cm^2   Wound Volume (cm^3) 0 cm^3   Drainage Amount None   Wound Odor None   Pain 1   Pain Scale 1 Numeric (0 - 10)   Pain Intensity 1 0   Visit Vitals  /60 (BP 1 Location: Left upper arm, BP Patient Position: Sitting)   Pulse 77   Temp 98.1 °F (36.7 °C)   Resp 18   Ht 5' 7\" (1.702 m)   Wt 74.4 kg (164 lb)   BMI 25.69 kg/m²

## 2022-10-06 NOTE — PROGRESS NOTES
Wound Center  Progress Note     Subjective:     Chief Complaint:  Merced Merchant is a 23 y.o.  female  with dog bite wound of since 9/4/2022    Referred by:  Kitty Pyle NP    HPI:     Dog bite several places on 9/4  Dog put down- tests neg for Rabies   So didn't hav to take any shots    Did take Augmentin from ER    Here today due to numbness right calf- saw NP on 9/29- referred here  More prominent while walking or when jeans rub against calf    Still has episodic shooting pain, starting at foot, traveling up while walking long distances      History/Chart/Medications reviewed    Wound caused by: dog bite      Wound associated pain: See flowsheet  Diabetic: no  Smoker: ex-smoker  ROS: no N/V/D, no T/chills; no local rash, no chest pain or shortness of breath, no headache or dizzyness        Objective:     Physical Exam:   See flowsheet / nursing notes for vitals  Visit Vitals  /60 (BP 1 Location: Left upper arm, BP Patient Position: Sitting)   Pulse 77   Temp 98.1 °F (36.7 °C)   Resp 18   Ht 5' 7\" (1.702 m)   Wt 74.4 kg (164 lb)   BMI 25.69 kg/m²     General: NAD. Hygiene good  Psych: cooperative. No anxiety or depression. Normal mood and affect. Neuro: alert and oriented to person/place/situation. Otherwise nonfocal.  Derm: Normal  turgor for age, dry skin  HEENT: Normocephalic, atraumatic. EOMI. Conjunctiva clear. No scleral icterus. Neck: Normal range of motion. No masses. Chest: Respirations nonlabored  Lower extremities: color normal; temperature normal. Hair growth is  present. Calves are supple, nontender, approximately equally sized in comparison.      Ulcer Description:   See Flowsheet    No open areas       Data Review:              Assessment/Plan     23 y.o. female with     -s/p dog bite- hand, calf, thigh  All healed    Palpable scar tissue 4x3cm area right calf  No open wounds, no hematoma    Monitor  Give 6-8 weeks, upto 6 months for numbness to heal  Skin remodeling takes 6month-2 years  reassured        Patient/ mom understood and agrees with plan. Questions answered. Follow up prn        Procedure:   N/a          -------    Past Medical History:   Diagnosis Date    ADHD     Anxiety     Anxiety     Borderline personality disorder ([facility]     Depression     Headache     Multiple personality disorder (HCC)     PTSD (post-traumatic stress disorder)     Reactive attachment disorder       History reviewed. No pertinent surgical history. History reviewed. No pertinent family history. Social History     Tobacco Use    Smoking status: Former     Types: Cigarettes     Quit date: 2021     Years since quittin.0    Smokeless tobacco: Current   Substance Use Topics    Alcohol use: Yes     Comment: once a week       Prior to Admission medications    Medication Sig Start Date End Date Taking? Authorizing Provider   traZODone (DESYREL) 100 mg tablet  22  Yes Provider, Historical   levonorgestrel-ethinyl estradiol (AVIANE, ALESSE, LESSINA) 0.1-20 mg-mcg tab Take 1 Tablet by mouth daily. 22  Yes Mario Hernández NP   buPROPion SR (WELLBUTRIN SR) 150 mg SR tablet Take 150 mg by mouth daily. Yes Other, MD Temo   lamoTRIgine (LaMICtal) 200 mg tablet Take 1 Tab by mouth daily. Patient taking differently: Take 200 mg by mouth two (2) times a day. 20  Yes Dylan Hernández NP   escitalopram oxalate (Lexapro) 20 mg tablet Take 1 Tab by mouth daily. 20  Yes Dylan Hernández NP   ibuprofen (MOTRIN) 800 mg tablet Take 1 Tablet by mouth every eight (8) hours as needed for Pain. 22   Mary Amin NP   hydrOXYzine HCL (ATARAX) 25 mg tablet Take 1 Tab by mouth three (3) times daily as needed for Anxiety.  20   Mary Amin NP     Allergies   Allergen Reactions    Tape [Adhesive] Other (comments)     3M clear medical tape        Signed By: Rogelio Lopez MD     2022

## 2022-11-13 ENCOUNTER — APPOINTMENT (OUTPATIENT)
Dept: GENERAL RADIOLOGY | Age: 22
End: 2022-11-13
Attending: PHYSICIAN ASSISTANT
Payer: OTHER GOVERNMENT

## 2022-11-13 ENCOUNTER — HOSPITAL ENCOUNTER (EMERGENCY)
Age: 22
Discharge: HOME | End: 2022-11-13
Attending: EMERGENCY MEDICINE
Payer: OTHER GOVERNMENT

## 2022-11-13 ENCOUNTER — APPOINTMENT (OUTPATIENT)
Dept: CT IMAGING | Age: 22
End: 2022-11-13
Attending: PHYSICIAN ASSISTANT
Payer: OTHER GOVERNMENT

## 2022-11-13 VITALS
RESPIRATION RATE: 16 BRPM | DIASTOLIC BLOOD PRESSURE: 80 MMHG | TEMPERATURE: 98.6 F | BODY MASS INDEX: 25.74 KG/M2 | OXYGEN SATURATION: 96 % | HEART RATE: 75 BPM | WEIGHT: 164 LBS | HEIGHT: 67 IN | SYSTOLIC BLOOD PRESSURE: 116 MMHG
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
## 2022-11-13 DIAGNOSIS — S16.1XXA STRAIN OF NECK MUSCLE, INITIAL ENCOUNTER: Primary | ICD-10-CM

## 2022-11-13 DIAGNOSIS — M54.50 ACUTE MIDLINE LOW BACK PAIN WITHOUT SCIATICA: ICD-10-CM

## 2022-11-13 DIAGNOSIS — S46.812A TRAPEZIUS STRAIN, LEFT, INITIAL ENCOUNTER: ICD-10-CM

## 2022-11-13 PROCEDURE — 73030 X-RAY EXAM OF SHOULDER: CPT

## 2022-11-13 PROCEDURE — 72100 X-RAY EXAM L-S SPINE 2/3 VWS: CPT

## 2022-11-13 PROCEDURE — 99284 EMERGENCY DEPT VISIT MOD MDM: CPT

## 2022-11-13 PROCEDURE — 72072 X-RAY EXAM THORAC SPINE 3VWS: CPT

## 2022-11-13 PROCEDURE — 72125 CT NECK SPINE W/O DYE: CPT

## 2022-11-13 PROCEDURE — 74011000250 HC RX REV CODE- 250: Performed by: PHYSICIAN ASSISTANT

## 2022-11-13 PROCEDURE — 72070 X-RAY EXAM THORAC SPINE 2VWS: CPT

## 2022-11-13 PROCEDURE — 74011250637 HC RX REV CODE- 250/637: Performed by: PHYSICIAN ASSISTANT

## 2022-11-13 RX ORDER — LIDOCAINE 4 G/100G
PATCH TOPICAL
Qty: 10 EACH | Refills: 0 | Status: SHIPPED | OUTPATIENT
Start: 2022-11-13

## 2022-11-13 RX ORDER — IBUPROFEN 800 MG/1
800 TABLET ORAL
Status: COMPLETED | OUTPATIENT
Start: 2022-11-13 | End: 2022-11-13

## 2022-11-13 RX ORDER — CYCLOBENZAPRINE HCL 5 MG
5 TABLET ORAL
Qty: 10 TABLET | Refills: 0 | Status: SHIPPED | OUTPATIENT
Start: 2022-11-13

## 2022-11-13 RX ORDER — LIDOCAINE 4 G/100G
1 PATCH TOPICAL
Status: DISCONTINUED | OUTPATIENT
Start: 2022-11-13 | End: 2022-11-13 | Stop reason: HOSPADM

## 2022-11-13 RX ADMIN — IBUPROFEN 800 MG: 800 TABLET, FILM COATED ORAL at 18:07
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
## 2022-11-14 NOTE — DISCHARGE INSTRUCTIONS
Continue your ibuprofen 800mg as directed. Do not work, drive or drink alcohol if taking Flexeril. Alternate ice and heat on your shoulder to help with spasms for the next 3-5 days.

## 2022-12-09 DIAGNOSIS — M26.623 BILATERAL TEMPOROMANDIBULAR JOINT PAIN: ICD-10-CM

## 2022-12-10 RX ORDER — IBUPROFEN 800 MG/1
TABLET ORAL
Qty: 45 TABLET | Refills: 1 | Status: SHIPPED | OUTPATIENT
Start: 2022-12-10

## 2022-12-23 ENCOUNTER — DOCUMENTATION ONLY (OUTPATIENT)
Dept: FAMILY MEDICINE CLINIC | Age: 22
End: 2022-12-23

## 2023-01-03 ENCOUNTER — DOCUMENTATION ONLY (OUTPATIENT)
Dept: FAMILY MEDICINE CLINIC | Age: 23
End: 2023-01-03

## 2023-01-03 NOTE — PROGRESS NOTES
[employer] [facility] [employer].  Requested Medical Records from [place] @ [phone]

## 2023-01-13 ENCOUNTER — APPOINTMENT (OUTPATIENT)
Dept: GENERAL RADIOLOGY | Age: 23
End: 2023-01-13
Attending: STUDENT IN AN ORGANIZED HEALTH CARE EDUCATION/TRAINING PROGRAM
Payer: OTHER GOVERNMENT

## 2023-01-13 ENCOUNTER — HOSPITAL ENCOUNTER (EMERGENCY)
Age: 23
Discharge: HOME | End: 2023-01-13
Attending: STUDENT IN AN ORGANIZED HEALTH CARE EDUCATION/TRAINING PROGRAM
Payer: OTHER GOVERNMENT

## 2023-01-13 VITALS
WEIGHT: 165 LBS | TEMPERATURE: 97.9 F | HEIGHT: 67 IN | DIASTOLIC BLOOD PRESSURE: 81 MMHG | SYSTOLIC BLOOD PRESSURE: 113 MMHG | HEART RATE: 96 BPM | OXYGEN SATURATION: 99 % | RESPIRATION RATE: 16 BRPM | BODY MASS INDEX: 25.9 KG/M2

## 2023-01-13 DIAGNOSIS — M25.561 CHRONIC PAIN OF RIGHT KNEE: Primary | ICD-10-CM

## 2023-01-13 DIAGNOSIS — G89.29 CHRONIC PAIN OF RIGHT KNEE: Primary | ICD-10-CM

## 2023-01-13 PROCEDURE — 73562 X-RAY EXAM OF KNEE 3: CPT

## 2023-01-13 PROCEDURE — 99283 EMERGENCY DEPT VISIT LOW MDM: CPT

## 2023-01-13 NOTE — DISCHARGE INSTRUCTIONS
Thank you for coming to the emergency room. The x-ray is read as normal.  We have put you in a knee immobilizer and crutches. Please follow-up with your primary care provider so they can give you an orthopedic physician that you would like to follow-up with. Please rest ice elevate and take Tylenol Motrin for pain.

## 2023-01-13 NOTE — ED NOTES
Pt was discharged and given instructions by MD. Pt verbalized good understanding of all discharge instructionsand F/U care. All questions answered. Pt in stable condition on discharge.

## 2023-01-13 NOTE — Clinical Note
[address]  [facility] EMERGENCY DEPT  [address] [phone]    Work/School Note    Date: 1/13/2023    To Whom It May concern:    Geoffrey Medina was seen and treated today in the emergency room by the following provider(s):  Attending Provider: Jonathan Hollis DO  Nurse Practitioner: ZHANE Anderson. Geoffrey Medina is excused from work/school on 1/13/2023 through 1/15/2023. She is medically clear to return to work/school on 1/16/2023.          Sincerely,          ZHANE Jara

## 2023-01-13 NOTE — ED PROVIDER NOTES
Silvio Richey is a 23 y.o. female with Hx of ADHD, anxiety, borderline personality, depression, headache, who presents with with mother to [facility] with cc of knee pain. Going up some stairs and fell like she jammed her right knee. She has had issues with her knee in the past she was supposed see an orthopedic doctor when she was out of state but never did. So today she presents after going up the stairs with pain she is having difficulty with flexion and extension. Slight swelling noted no deformity. Anything for the pain. PCP: Other, MD Temo    There are no other complaints, changes or physical findings at this time. Knee Pain        Past Medical History:   Diagnosis Date    ADHD     Anxiety     Anxiety     Borderline personality disorder ([address]     Depression     Headache     Multiple personality disorder (HCC)     PTSD (post-traumatic stress disorder)     Reactive attachment disorder        No past surgical history on file. No family history on file.     Social History     Socioeconomic History    Marital status: SINGLE     Spouse name: Not on file    Number of children: Not on file    Years of education: Not on file    Highest education level: Not on file   Occupational History    Not on file   Tobacco Use    Smoking status: Former     Types: Cigarettes     Quit date: 2021     Years since quittin.2    Smokeless tobacco: Current   Vaping Use    Vaping Use: Never used   Substance and Sexual Activity    Alcohol use: Yes     Comment: once a week    Drug use: Not Currently     Types: Marijuana    Sexual activity: Not on file   Other Topics Concern    Not on file   Social History Narrative    Not on file     Social Determinants of Health     Financial Resource Strain: Not on file   Food Insecurity: Not on file   Transportation Needs: Not on file   Physical Activity: Not on file   Stress: Not on file   Social Connections: Not on file   Intimate Partner Violence: Not on file   Housing Stability: Not on file         ALLERGIES: Tape [adhesive]    Review of Systems   Constitutional:  Negative for activity change, appetite change, chills and fever. HENT:  Negative for congestion, rhinorrhea and sore throat. Eyes:  Negative for visual disturbance. Respiratory:  Negative for cough and shortness of breath. Cardiovascular:  Negative for chest pain. Gastrointestinal:  Negative for abdominal distention, diarrhea, nausea and vomiting. Genitourinary:  Negative for difficulty urinating, dysuria and menstrual problem. Musculoskeletal:  Positive for arthralgias. Negative for gait problem and myalgias. Skin:  Negative for color change and rash. Neurological:  Negative for weakness and headaches. Psychiatric/Behavioral:  Negative for agitation, behavioral problems and confusion. Vitals:    01/13/23 1258   BP: 113/81   Pulse: 96   Resp: 16   Temp: 97.9 °F (36.6 °C)   SpO2: 99%   Weight: 74.8 kg (165 lb)   Height: 5' 7\" (1.702 m)            Physical Exam  Vitals and nursing note reviewed. Constitutional:       Appearance: Normal appearance. HENT:      Head: Normocephalic and atraumatic. Right Ear: External ear normal.      Left Ear: External ear normal.   Eyes:      Extraocular Movements: Extraocular movements intact. Conjunctiva/sclera: Conjunctivae normal.      Pupils: Pupils are equal, round, and reactive to light. Cardiovascular:      Rate and Rhythm: Normal rate and regular rhythm. Pulmonary:      Effort: Pulmonary effort is normal.      Breath sounds: Normal breath sounds. Abdominal:      General: Abdomen is flat. Palpations: Abdomen is soft. Musculoskeletal:         General: Swelling, tenderness and signs of injury present. No deformity. Normal range of motion. Cervical back: Normal range of motion and neck supple. Right lower leg: No edema. Skin:     General: Skin is warm and dry. Neurological:      General: No focal deficit present. Mental Status: She is alert and oriented to person, place, and time. Mental status is at baseline. Psychiatric:         Mood and Affect: Mood normal.         Thought Content: Thought content normal.         Judgment: Judgment normal.        Medical Decision Making  Differential diagnosis: Pain, right knee effusion    -year-old who was going up the stairs and felt like she jammed her right knee. She has a past medical history of knee issues. She has been seeing Ortho and never did. Patient is having difficulty with flexion and extension like she can barely walk on it. She has no pain in her hips she has no pain in her ankles she has good quad and hamstring strength. Unclear patellar does seem to track okay with flexion and extension but limited. There is no instability with varus and valgus but she does have some pain she does have tenderness on the medial lateral joint. She has a negative Lachman's. Sensation is intact she has good brisk cap refill no intra-articular effusion. No prepatellar bursa or redness no rashes no ulcerations intact DP PT pulses. Patient placed in a knee immobilizer with crutches. She is to ice elevate rest and take Tylenol Motrin as needed for pain. Mother wants to get her own orthopedic. Urged    Amount and/or Complexity of Data Reviewed  Radiology: ordered.            Procedures

## 2023-01-19 ENCOUNTER — OFFICE VISIT (OUTPATIENT)
Dept: FAMILY MEDICINE CLINIC | Age: 23
End: 2023-01-19
Payer: OTHER GOVERNMENT

## 2023-01-19 VITALS
DIASTOLIC BLOOD PRESSURE: 71 MMHG | SYSTOLIC BLOOD PRESSURE: 103 MMHG | RESPIRATION RATE: 18 BRPM | TEMPERATURE: 98.1 F | BODY MASS INDEX: 27.47 KG/M2 | OXYGEN SATURATION: 99 % | HEIGHT: 67 IN | WEIGHT: 175 LBS | HEART RATE: 75 BPM

## 2023-01-19 DIAGNOSIS — K29.00 ACUTE GASTRITIS, PRESENCE OF BLEEDING UNSPECIFIED, UNSPECIFIED GASTRITIS TYPE: ICD-10-CM

## 2023-01-19 DIAGNOSIS — Z13.31 POSITIVE DEPRESSION SCREENING: ICD-10-CM

## 2023-01-19 DIAGNOSIS — R11.0 CHRONIC NAUSEA: Primary | ICD-10-CM

## 2023-01-19 DIAGNOSIS — M25.561 RIGHT KNEE PAIN, UNSPECIFIED CHRONICITY: ICD-10-CM

## 2023-01-19 PROCEDURE — 99214 OFFICE O/P EST MOD 30 MIN: CPT | Performed by: FAMILY MEDICINE

## 2023-01-19 RX ORDER — PANTOPRAZOLE SODIUM 20 MG/1
20 TABLET, DELAYED RELEASE ORAL DAILY
Qty: 30 TABLET | Refills: 1 | Status: SHIPPED | OUTPATIENT
Start: 2023-01-19

## 2023-01-19 RX ORDER — ONDANSETRON 4 MG/1
4 TABLET, ORALLY DISINTEGRATING ORAL
Qty: 20 TABLET | Refills: 1 | Status: SHIPPED | OUTPATIENT
Start: 2023-01-19

## 2023-01-19 NOTE — PROGRESS NOTES
Chief Complaint   Patient presents with    Nausea     Patient presents in office today with c/o nausea when eating. Would also like to discuss getting a referral for ortho  No other concerns. 1. Have you been to the ER, urgent care clinic since your last visit? Hospitalized since your last visit? No    2. Have you seen or consulted any other health care providers outside of the [address] [place] [address] since your last visit? Include any pap smears or colon screening.  No      Learning Assessment 1/19/2023   PRIMARY LEARNER Patient   PRIMARY LANGUAGE ENGLISH   LEARNER PREFERENCE PRIMARY LISTENING   ANSWERED BY self   RELATIONSHIP SELF

## 2023-02-21 ENCOUNTER — OFFICE VISIT (OUTPATIENT)
Dept: FAMILY MEDICINE CLINIC | Age: 23
End: 2023-02-21

## 2023-02-21 VITALS
HEART RATE: 90 BPM | TEMPERATURE: 98.1 F | DIASTOLIC BLOOD PRESSURE: 63 MMHG | SYSTOLIC BLOOD PRESSURE: 96 MMHG | BODY MASS INDEX: 27.15 KG/M2 | RESPIRATION RATE: 18 BRPM | OXYGEN SATURATION: 99 % | WEIGHT: 173 LBS | HEIGHT: 67 IN

## 2023-02-21 DIAGNOSIS — R19.7 DIARRHEA, UNSPECIFIED TYPE: ICD-10-CM

## 2023-02-21 DIAGNOSIS — R11.0 CHRONIC NAUSEA: ICD-10-CM

## 2023-02-21 DIAGNOSIS — K62.5 BRBPR (BRIGHT RED BLOOD PER RECTUM): Primary | ICD-10-CM

## 2023-02-21 LAB
ALBUMIN SERPL-MCNC: 4.2 G/DL (ref 3.5–5)
ALBUMIN/GLOB SERPL: 1.4 (ref 1.1–2.2)
ALP SERPL-CCNC: 66 U/L (ref 45–117)
ALT SERPL-CCNC: 18 U/L (ref 12–78)
ANION GAP SERPL CALC-SCNC: 5 MMOL/L (ref 5–15)
AST SERPL-CCNC: 16 U/L (ref 15–37)
BILIRUB SERPL-MCNC: 0.4 MG/DL (ref 0.2–1)
BUN SERPL-MCNC: 12 MG/DL (ref 6–20)
BUN/CREAT SERPL: 13 (ref 12–20)
CALCIUM SERPL-MCNC: 9.2 MG/DL (ref 8.5–10.1)
CHLORIDE SERPL-SCNC: 107 MMOL/L (ref 97–108)
CO2 SERPL-SCNC: 29 MMOL/L (ref 21–32)
CREAT SERPL-MCNC: 0.93 MG/DL (ref 0.55–1.02)
ERYTHROCYTE [DISTWIDTH] IN BLOOD BY AUTOMATED COUNT: 12.7 % (ref 11.5–14.5)
GLOBULIN SER CALC-MCNC: 3 G/DL (ref 2–4)
GLUCOSE SERPL-MCNC: 89 MG/DL (ref 65–100)
HCT VFR BLD AUTO: 38.9 % (ref 35–47)
HGB BLD-MCNC: 11.8 G/DL (ref 11.5–16)
MCH RBC QN AUTO: 27.7 PG (ref 26–34)
MCHC RBC AUTO-ENTMCNC: 30.3 G/DL (ref 30–36.5)
MCV RBC AUTO: 91.3 FL (ref 80–99)
NRBC # BLD: 0 K/UL (ref 0–0.01)
NRBC BLD-RTO: 0 PER 100 WBC
PLATELET # BLD AUTO: 216 K/UL (ref 150–400)
PMV BLD AUTO: 12.5 FL (ref 8.9–12.9)
POTASSIUM SERPL-SCNC: 4.5 MMOL/L (ref 3.5–5.1)
PROT SERPL-MCNC: 7.2 G/DL (ref 6.4–8.2)
RBC # BLD AUTO: 4.26 M/UL (ref 3.8–5.2)
SODIUM SERPL-SCNC: 141 MMOL/L (ref 136–145)
WBC # BLD AUTO: 4 K/UL (ref 3.6–11)

## 2023-02-21 PROCEDURE — 99214 OFFICE O/P EST MOD 30 MIN: CPT | Performed by: NURSE PRACTITIONER

## 2023-02-21 RX ORDER — HYDROCORTISONE 25 MG/G
CREAM TOPICAL
Qty: 30 G | Refills: 1 | Status: SHIPPED | OUTPATIENT
Start: 2023-02-21

## 2023-02-21 NOTE — PROGRESS NOTES
Chief Complaint   Patient presents with    GI Problem       1. Patient in office today for follow up on gastritis. Pt reports nausea,diarrhea,and blood in stools. Blood in stool was noted 2 wks ago. Reports blood in stool along with wiping. Bright red. Reports rectal pain with bowel movements. Denies anal itching. Denies laxative or stool softener usage. Denies hx of hemorrhoids. Last dose of ibuprofen was yesterday,first usage in 2wks. Reports vomiting with nausea. Will occur 1-2x every 2-3 days. Pt takes zofran for sx-will resolve sx. Pt reports indigestion-trigger drink is energy drink  Does not have protonix regularly. Diarrhea is with every bowel movement. Will note 1-2 bowel movements every 2-3 days-this is pt's normal bowel regimen. Reports abdominal pain with diarrhea-lower abdomen. Pain will lessen after bowel movement. Pt has hx of constipation. Pt is not est with gi specalist        1. Have you been to the ER, urgent care clinic since your last visit? Hospitalized since your last visit? No    2. Have you seen or consulted any other health care providers outside of the [address] [place] [address] since your last visit? Include any pap smears or colon screening.  No

## 2023-02-21 NOTE — PROGRESS NOTES
Kim López (: 2000) is a 23 y.o. other, established patient, here for evaluation of the following chief complaint(s):  GI Problem       ASSESSMENT/PLAN:  Below is the assessment and plan developed based on review of pertinent history, physical exam, labs, studies, and medications. 1. BRBPR (bright red blood per rectum)  Internal hemorrhoid likely cause  Add fiber supplement  Add anusol cream  Check CBC  Refer for further eval  -     CBC W/O DIFF; Future  -     REFERRAL TO GASTROENTEROLOGY  -     hydrocortisone (ANUSOL-HC) 2.5 % rectal cream; Insert  into rectum four (4) times daily as needed for Hemorrhoids. , Normal, Disp-30 g, R-1    2. Chronic nausea  ? Gastritis/GERD  Take pantoprazole daily 30 minutes before lunch  Abstain from energy drinks and other triggers  Continue zofran prn  Check cmp  Refer for further eval  -     METABOLIC PANEL, COMPREHENSIVE; Future  -     REFERRAL TO GASTROENTEROLOGY    3. Diarrhea, unspecified type  Add fiber supplement, probiotic  Immodium prn  Refer for further eval  -     REFERRAL TO GASTROENTEROLOGY      Return in about 6 weeks (around 2023), or if symptoms worsen or fail to improve, for nausea. I have discussed the diagnosis with the patient and the intended plan as seen in the above orders. The patient has received an after-visit summary and questions were answered concerning future plans. Patient conveyed understanding of the plan at the time of the visit. SUBJECTIVE/OBJECTIVE:  Presents for follow up of gastritis. Accompanied by her mother. C/o continued nausea, some episodes of vomiting, epigastric pain. Has not been taking prescribed pantoprazole from visit with Dr. Talon Saenz 4 weeks ago. Reports she was advised to take 30 minutes before breakfast and she does not eat breakfast. Notes symptoms are aggravated by energy drinks. Gluten-free diet did not improve symptoms. Weight stable. No difficulty swallowing. No bloody or coffee ground emesis. C/o intermittent episodes of diarrhea, has tried no medications or other treatment for symptoms. Has noted bright red blood in commode and on TP after BM, also noting pain with BMs. No melana or dark stools. No abdominal pain, fever, or chills. Past Medical History:   Diagnosis Date    ADHD     Anxiety     Anxiety     Borderline personality disorder ([address]     Depression     Headache     Multiple personality disorder (HCC)     PTSD (post-traumatic stress disorder)     Reactive attachment disorder      History reviewed. No pertinent surgical history. History reviewed. No pertinent family history. Social History     Tobacco Use    Smoking status: Former     Types: Cigarettes     Quit date: 2021     Years since quittin.4    Smokeless tobacco: Current   Substance Use Topics    Alcohol use: Yes     Comment: once a week         Review of Systems   Constitutional:  Negative for appetite change, chills, fatigue, fever and unexpected weight change. HENT: Negative. Eyes: Negative. Respiratory: Negative. Gastrointestinal:  Positive for abdominal pain, anal bleeding, diarrhea, nausea, rectal pain and vomiting. Negative for abdominal distention, blood in stool and constipation. Endocrine: Negative. Genitourinary: Negative. Musculoskeletal: Negative. Allergic/Immunologic: Negative. Neurological: Negative. Hematological: Negative. Psychiatric/Behavioral: Negative.        Visit Vitals  BP 96/63 (BP 1 Location: Right arm, BP Patient Position: Sitting)   Pulse 90   Temp 98.1 °F (36.7 °C) (Oral)   Resp 18   Ht 5' 7\" (1.702 m)   Wt 173 lb (78.5 kg)   SpO2 99%   BMI 27.10 kg/m²     Physical Examination: General appearance - alert, well appearing, and in no distress and oriented to person, place, and time  Mental status - normal mood, behavior, speech, dress, motor activity, and thought processes  Eyes - sclera anicteric  Neck - supple, no significant adenopathy, thyroid exam: thyroid is normal in size without nodules or tenderness  Chest - clear to auscultation, no wheezes, rales or rhonchi, symmetric air entry  Heart - normal rate, regular rhythm, normal S1, S2, no murmurs, rubs, clicks or gallops, normal bilateral carotid upstroke without bruits, no JVD  Abdomen - soft, nontender, nondistended, no masses or organomegaly  bowel sounds normal  Neurological - alert, oriented, normal speech, no focal findings or movement disorder noted  Extremities - no pedal edema noted, intact peripheral pulses, no edema, redness or tenderness in the calves or thighs  Skin - normal coloration and turgor, no rashes      An electronic signature was used to authenticate this note.   -- Kim Krishnan, EFREM   2/21/2023

## 2023-03-23 ENCOUNTER — HOSPITAL ENCOUNTER (EMERGENCY)
Age: 23
Discharge: PSYCHIATRIC OR SUBSTANCE USE FACILITY | End: 2023-03-24
Attending: EMERGENCY MEDICINE

## 2023-03-23 DIAGNOSIS — R45.851 SUICIDAL IDEATION: ICD-10-CM

## 2023-03-23 DIAGNOSIS — R45.850 HOMICIDAL IDEATION: Primary | ICD-10-CM

## 2023-03-23 LAB
ALBUMIN SERPL-MCNC: 4 G/DL (ref 3.5–5)
ALBUMIN/GLOB SERPL: 1.1 (ref 1.1–2.2)
ALP SERPL-CCNC: 65 U/L (ref 45–117)
ALT SERPL-CCNC: 22 U/L (ref 12–78)
AMPHET UR QL SCN: NEGATIVE
ANION GAP SERPL CALC-SCNC: 2 MMOL/L (ref 5–15)
APAP SERPL-MCNC: <2 UG/ML (ref 10–30)
APPEARANCE UR: ABNORMAL
AST SERPL-CCNC: 15 U/L (ref 15–37)
BACTERIA URNS QL MICRO: ABNORMAL /HPF
BARBITURATES UR QL SCN: NEGATIVE
BASOPHILS # BLD: 0 K/UL (ref 0–0.1)
BASOPHILS NFR BLD: 1 % (ref 0–1)
BENZODIAZ UR QL: NEGATIVE
BILIRUB SERPL-MCNC: 0.2 MG/DL (ref 0.2–1)
BILIRUB UR QL: NEGATIVE
BUN SERPL-MCNC: 15 MG/DL (ref 6–20)
BUN/CREAT SERPL: 18 (ref 12–20)
CALCIUM SERPL-MCNC: 9.3 MG/DL (ref 8.5–10.1)
CANNABINOIDS UR QL SCN: NEGATIVE
CHLORIDE SERPL-SCNC: 108 MMOL/L (ref 97–108)
CO2 SERPL-SCNC: 28 MMOL/L (ref 21–32)
COCAINE UR QL SCN: NEGATIVE
COLOR UR: ABNORMAL
COMMENT, HOLDF: NORMAL
CREAT SERPL-MCNC: 0.83 MG/DL (ref 0.55–1.02)
DIFFERENTIAL METHOD BLD: NORMAL
DRUG SCRN COMMENT,DRGCM: NORMAL
EOSINOPHIL # BLD: 0.1 K/UL (ref 0–0.4)
EOSINOPHIL NFR BLD: 2 % (ref 0–7)
EPITH CASTS URNS QL MICRO: ABNORMAL /LPF
ERYTHROCYTE [DISTWIDTH] IN BLOOD BY AUTOMATED COUNT: 12.1 % (ref 11.5–14.5)
ETHANOL SERPL-MCNC: <10 MG/DL
FLUAV RNA SPEC QL NAA+PROBE: NOT DETECTED
FLUBV RNA SPEC QL NAA+PROBE: NOT DETECTED
GLOBULIN SER CALC-MCNC: 3.5 G/DL (ref 2–4)
GLUCOSE SERPL-MCNC: 104 MG/DL (ref 65–100)
GLUCOSE UR STRIP.AUTO-MCNC: NEGATIVE MG/DL
HCG UR QL: NEGATIVE
HCT VFR BLD AUTO: 40 % (ref 35–47)
HGB BLD-MCNC: 12.6 G/DL (ref 11.5–16)
HGB UR QL STRIP: NEGATIVE
IMM GRANULOCYTES # BLD AUTO: 0 K/UL (ref 0–0.04)
IMM GRANULOCYTES NFR BLD AUTO: 0 % (ref 0–0.5)
KETONES UR QL STRIP.AUTO: NEGATIVE MG/DL
LEUKOCYTE ESTERASE UR QL STRIP.AUTO: NEGATIVE
LYMPHOCYTES # BLD: 1.7 K/UL (ref 0.8–3.5)
LYMPHOCYTES NFR BLD: 28 % (ref 12–49)
MCH RBC QN AUTO: 28.1 PG (ref 26–34)
MCHC RBC AUTO-ENTMCNC: 31.5 G/DL (ref 30–36.5)
MCV RBC AUTO: 89.1 FL (ref 80–99)
METHADONE UR QL: NEGATIVE
MONOCYTES # BLD: 0.6 K/UL (ref 0–1)
MONOCYTES NFR BLD: 11 % (ref 5–13)
NEUTS SEG # BLD: 3.6 K/UL (ref 1.8–8)
NEUTS SEG NFR BLD: 58 % (ref 32–75)
NITRITE UR QL STRIP.AUTO: NEGATIVE
NRBC # BLD: 0 K/UL (ref 0–0.01)
NRBC BLD-RTO: 0 PER 100 WBC
OPIATES UR QL: NEGATIVE
PCP UR QL: NEGATIVE
PH UR STRIP: 6 (ref 5–8)
PLATELET # BLD AUTO: 245 K/UL (ref 150–400)
PMV BLD AUTO: 11.7 FL (ref 8.9–12.9)
POTASSIUM SERPL-SCNC: 3.6 MMOL/L (ref 3.5–5.1)
PROT SERPL-MCNC: 7.5 G/DL (ref 6.4–8.2)
PROT UR STRIP-MCNC: NEGATIVE MG/DL
RBC # BLD AUTO: 4.49 M/UL (ref 3.8–5.2)
RBC #/AREA URNS HPF: ABNORMAL /HPF (ref 0–5)
SALICYLATES SERPL-MCNC: <1.7 MG/DL (ref 2.8–20)
SAMPLES BEING HELD,HOLD: NORMAL
SARS-COV-2 RNA RESP QL NAA+PROBE: NOT DETECTED
SODIUM SERPL-SCNC: 138 MMOL/L (ref 136–145)
SP GR UR REFRACTOMETRY: 1.01 (ref 1–1.03)
UR CULT HOLD, URHOLD: NORMAL
UROBILINOGEN UR QL STRIP.AUTO: 0.2 EU/DL (ref 0.2–1)
WBC # BLD AUTO: 6.1 K/UL (ref 3.6–11)
WBC URNS QL MICRO: ABNORMAL /HPF (ref 0–4)

## 2023-03-23 PROCEDURE — 99285 EMERGENCY DEPT VISIT HI MDM: CPT

## 2023-03-23 PROCEDURE — 80179 DRUG ASSAY SALICYLATE: CPT

## 2023-03-23 PROCEDURE — 81001 URINALYSIS AUTO W/SCOPE: CPT

## 2023-03-23 PROCEDURE — 80307 DRUG TEST PRSMV CHEM ANLYZR: CPT

## 2023-03-23 PROCEDURE — 87636 SARSCOV2 & INF A&B AMP PRB: CPT

## 2023-03-23 PROCEDURE — 82077 ASSAY SPEC XCP UR&BREATH IA: CPT

## 2023-03-23 PROCEDURE — 74011250637 HC RX REV CODE- 250/637: Performed by: EMERGENCY MEDICINE

## 2023-03-23 PROCEDURE — 81025 URINE PREGNANCY TEST: CPT

## 2023-03-23 PROCEDURE — 80053 COMPREHEN METABOLIC PANEL: CPT

## 2023-03-23 PROCEDURE — 36415 COLL VENOUS BLD VENIPUNCTURE: CPT

## 2023-03-23 PROCEDURE — 90791 PSYCH DIAGNOSTIC EVALUATION: CPT

## 2023-03-23 PROCEDURE — 80143 DRUG ASSAY ACETAMINOPHEN: CPT

## 2023-03-23 PROCEDURE — 85025 COMPLETE CBC W/AUTO DIFF WBC: CPT

## 2023-03-23 RX ORDER — PANTOPRAZOLE SODIUM 40 MG/1
40 TABLET, DELAYED RELEASE ORAL
Status: DISCONTINUED | OUTPATIENT
Start: 2023-03-24 | End: 2023-03-23

## 2023-03-23 RX ORDER — PANTOPRAZOLE SODIUM 40 MG/1
40 TABLET, DELAYED RELEASE ORAL
Status: DISCONTINUED | OUTPATIENT
Start: 2023-03-23 | End: 2023-03-24 | Stop reason: HOSPADM

## 2023-03-23 RX ADMIN — PANTOPRAZOLE SODIUM 40 MG: 40 TABLET, DELAYED RELEASE ORAL at 21:15
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
## 2023-03-23 NOTE — ED NOTES
1909 [employer] by bedside accessing. 1924 Sitter by bedside charting q15. Patient assisted to bathroom to be placed in green gown. Security wand patient. [facility] bagged and tagged. Patient is tearful.
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
## 2023-03-23 NOTE — BSMART NOTE
BSMART assessment completed, and suicide risk level noted to be HIGH. Primary Nurse NA and Charge Nurse Phillip Quevedo and Physician NA notified. Concerns not observed.     Security/Off- has not been notified.'
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
## 2023-03-24 ENCOUNTER — HOSPITAL ENCOUNTER (INPATIENT)
Age: 23
LOS: 4 days | Discharge: HOME | DRG: 883 | End: 2023-03-28
Attending: PSYCHIATRY & NEUROLOGY | Admitting: PSYCHIATRY & NEUROLOGY
Payer: OTHER GOVERNMENT

## 2023-03-24 VITALS
OXYGEN SATURATION: 97 % | DIASTOLIC BLOOD PRESSURE: 64 MMHG | SYSTOLIC BLOOD PRESSURE: 130 MMHG | WEIGHT: 167.77 LBS | BODY MASS INDEX: 26.28 KG/M2 | HEART RATE: 72 BPM | TEMPERATURE: 98.6 F | RESPIRATION RATE: 16 BRPM

## 2023-03-24 PROBLEM — F39 UNSPECIFIED MOOD (AFFECTIVE) DISORDER (HCC): Status: ACTIVE | Noted: 2023-03-24

## 2023-03-24 PROCEDURE — 74011250637 HC RX REV CODE- 250/637: Performed by: PSYCHIATRY & NEUROLOGY

## 2023-03-24 PROCEDURE — 65220000003 HC RM SEMIPRIVATE PSYCH

## 2023-03-24 PROCEDURE — 74011250637 HC RX REV CODE- 250/637: Performed by: EMERGENCY MEDICINE

## 2023-03-24 RX ORDER — ACETAMINOPHEN 325 MG/1
650 TABLET ORAL
Status: DISCONTINUED | OUTPATIENT
Start: 2023-03-24 | End: 2023-03-28 | Stop reason: HOSPADM

## 2023-03-24 RX ORDER — IBUPROFEN 200 MG
1 TABLET ORAL DAILY
Status: DISCONTINUED | OUTPATIENT
Start: 2023-03-24 | End: 2023-03-28 | Stop reason: HOSPADM

## 2023-03-24 RX ORDER — OLANZAPINE 5 MG/1
5 TABLET ORAL
Status: DISCONTINUED | OUTPATIENT
Start: 2023-03-24 | End: 2023-03-28 | Stop reason: HOSPADM

## 2023-03-24 RX ORDER — ESCITALOPRAM OXALATE 10 MG/1
10 TABLET ORAL DAILY
COMMUNITY
End: 2023-03-28

## 2023-03-24 RX ORDER — HALOPERIDOL 5 MG/ML
5 INJECTION INTRAMUSCULAR
Status: DISCONTINUED | OUTPATIENT
Start: 2023-03-24 | End: 2023-03-28 | Stop reason: HOSPADM

## 2023-03-24 RX ORDER — IBUPROFEN 200 MG
1 TABLET ORAL DAILY
Status: DISCONTINUED | OUTPATIENT
Start: 2023-03-25 | End: 2023-03-24

## 2023-03-24 RX ORDER — HYDROXYZINE 25 MG/1
50 TABLET, FILM COATED ORAL
Status: DISCONTINUED | OUTPATIENT
Start: 2023-03-24 | End: 2023-03-28 | Stop reason: HOSPADM

## 2023-03-24 RX ORDER — TRAZODONE HYDROCHLORIDE 50 MG/1
200 TABLET ORAL ONCE
Status: COMPLETED | OUTPATIENT
Start: 2023-03-24 | End: 2023-03-24

## 2023-03-24 RX ORDER — BENZTROPINE MESYLATE 1 MG/1
1 TABLET ORAL
Status: DISCONTINUED | OUTPATIENT
Start: 2023-03-24 | End: 2023-03-28 | Stop reason: HOSPADM

## 2023-03-24 RX ORDER — LORAZEPAM 2 MG/ML
1 INJECTION INTRAMUSCULAR
Status: DISCONTINUED | OUTPATIENT
Start: 2023-03-24 | End: 2023-03-28 | Stop reason: HOSPADM

## 2023-03-24 RX ORDER — LAMOTRIGINE 200 MG/1
200 TABLET ORAL 2 TIMES DAILY
Status: ON HOLD | COMMUNITY
End: 2023-03-28 | Stop reason: SDUPTHER

## 2023-03-24 RX ORDER — TRAZODONE HYDROCHLORIDE 50 MG/1
50 TABLET ORAL
Status: DISCONTINUED | OUTPATIENT
Start: 2023-03-24 | End: 2023-03-25

## 2023-03-24 RX ORDER — ADHESIVE BANDAGE
30 BANDAGE TOPICAL DAILY PRN
Status: DISCONTINUED | OUTPATIENT
Start: 2023-03-24 | End: 2023-03-28 | Stop reason: HOSPADM

## 2023-03-24 RX ORDER — LAMOTRIGINE 100 MG/1
200 TABLET ORAL ONCE
Status: COMPLETED | OUTPATIENT
Start: 2023-03-24 | End: 2023-03-24

## 2023-03-24 RX ORDER — DIPHENHYDRAMINE HYDROCHLORIDE 50 MG/ML
50 INJECTION, SOLUTION INTRAMUSCULAR; INTRAVENOUS
Status: DISCONTINUED | OUTPATIENT
Start: 2023-03-24 | End: 2023-03-28 | Stop reason: HOSPADM

## 2023-03-24 RX ADMIN — HYDROXYZINE HYDROCHLORIDE 50 MG: 25 TABLET, FILM COATED ORAL at 21:15

## 2023-03-24 RX ADMIN — ACETAMINOPHEN 650 MG: 325 TABLET ORAL at 19:52

## 2023-03-24 RX ADMIN — TRAZODONE HYDROCHLORIDE 50 MG: 50 TABLET ORAL at 21:14

## 2023-03-24 RX ADMIN — TRAZODONE HYDROCHLORIDE 200 MG: 50 TABLET ORAL at 01:16

## 2023-03-24 RX ADMIN — LAMOTRIGINE 200 MG: 100 TABLET ORAL at 01:16

## 2023-03-24 NOTE — GROUP NOTE
DAVE  GROUP DOCUMENTATION INDIVIDUAL                                                                          Group Therapy Note    Date: 3/24/2023    Group Start Time: 7081  Group End Time: 1008  Group Topic: Community Meeting    [address], [address] GROUP DOCUMENTATION GROUP    Group Therapy Note    Attendees: 3       Attendance: Did not attend      Kenzie Us
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
## 2023-03-24 NOTE — ED NOTES
Nurse report handed off to ΠΑΤΡΙΚΙ RN @ [facility]    [facility] [address] [facility] notified of eta

## 2023-03-24 NOTE — PROGRESS NOTES
Behavioral Services  Medicare Certification Upon Admission    I certify that this patient's inpatient psychiatric hospital admission is medically necessary for:    [x] (1) Treatment which could reasonably be expected to improve this patient's condition,       [x] (2) Or for diagnostic study;     AND     [x](2) The inpatient psychiatric services are provided while the individual is under the care of a physician and are included in the individualized plan of care.     Estimated length of stay/service 5- 7 days     Plan for post-hospital care per social work    Electronically signed by Jennifer Connelly MD on 3/24/2023 at 2:24 PM

## 2023-03-24 NOTE — BH NOTES
Admission assessment complete. Pt is alert and oriented x 4. Pt identifies as non-binary and uses the pronoun they. Denies SI at this time. Pt explains that they have HI towards their infant 1 month old niece. They also confirm auditory hallucinations, with voices that are both pleasant and non-pleasant. Pt confirms feelings of  both anxiety and depression. Body and belonging search completed with no noted issues. Monitoring will continue.

## 2023-03-24 NOTE — ED PROVIDER NOTES
27-year-old female signed out to me as a behavioral health hold. Care signed out to me by Dr. Cornelia Echevarria. Please see initial notes for details.   Patient excepted for psychiatric admission at [facility] and was transported in stable condition

## 2023-03-24 NOTE — BSMART NOTE
Patient accepted to [address] [place] [address] to [address] by NP, Yoshi Dey and Dr Victor M Marquez. Nurse to nurse for report: [phone].

## 2023-03-24 NOTE — PROGRESS NOTES
[facility] Admission Pharmacy Medication Reconciliation    Information obtained from: Patient interview, RxQuery  RxQuery data available1: Yes    Comments/recommendations:  Patient is a good medication historian. Reports fair compliance with medications. Will provide a pillbox to patient at discharge to assist with compliance. Added NSAIDs to allergy list per patient request  Confirmed preferred outpatient pharmacy ([facility] [place])    Medication changes (since last review): Added  Probioitic  Escitalopram 10 mg  Removed  Cyclobenzaprine  Hydrocortisone 2.5% rectal cream  Hydroxyzine HCl  Ibuprofen  Lidocaine 4% patch  Ondansetron ODT  Adjusted  Bupropion SR frequency  Lamotrigine frequency  Trazodone frequency     1RxQuery pharmacy benefit data reflects medications filled and processed through the patient's insurance, however                this data does NOT capture whether the medication was picked up or is currently being taken by the patient. Patient allergies: Allergies as of 03/23/2023 - Fully Reviewed 03/23/2023   Allergen Reaction Noted    Tape [adhesive] Other (comments) 09/09/2022       Prior to Admission Medications   Prescriptions Last Dose Informant Patient Reported? Taking? Lactobac no.41/Bifidobact no.7 (PROBIOTIC-10 PO)  Self Yes Yes   Sig: Take  by mouth nightly. buPROPion SR (WELLBUTRIN SR) 150 mg SR tablet 3/22/2023 Self Yes Yes   Sig: Take 150 mg by mouth two (2) times a day. escitalopram oxalate (LEXAPRO) 10 mg tablet 3/22/2023 Self Yes Yes   Sig: Take 10 mg by mouth daily. Take with 20 mg tablet for total dose of 30 mg   escitalopram oxalate (Lexapro) 20 mg tablet 3/22/2023 Self No Yes   Sig: Take 1 Tab by mouth daily. Patient taking differently: Take 20 mg by mouth daily. Take with 10 mg tablet for total dose of 30 mg   lamoTRIgine (LaMICtal) 200 mg tablet 3/23/2023 Self Yes Yes   Sig: Take 200 mg by mouth two (2) times a day.    levonorgestrel-ethinyl estradiol SAMANTHA Gibson LESSINA) 0.1-20 mg-mcg tab 3/22/2023 Self No Yes   Sig: Take 1 Tablet by mouth daily. pantoprazole (PROTONIX) 20 mg tablet 3/22/2023 Self No Yes   Sig: Take 1 Tablet by mouth daily. traZODone (DESYREL) 100 mg tablet 3/23/2023 Self Yes Yes   Sig: Take 200 mg by mouth nightly.       Facility-Administered Medications: None        Thank you,  Lonny Pettit, PHARMD, BCPS, [facility]  Clinical Pharmacy Specialist, [facility]  Desk: [phone] ([id number])  Pharmacy: [phone] ([id number])

## 2023-03-24 NOTE — ED NOTES
Breakfast tray has been given to patient. [facility] is here to transport patient to [facility].

## 2023-03-24 NOTE — GROUP NOTE
DAVE  GROUP DOCUMENTATION INDIVIDUAL                                                                          Group Therapy Note    Date: 3/24/2023    Group Start Time: 1400  Group End Time: 1500  Group Topic: Recreational/Music Therapy    [facility] 3 ACUTE BEHAV [facility]    [address] GROUP    Group Therapy Note    Attendees: 4       Attendance: Did not attend    Keeley Huang

## 2023-03-24 NOTE — BH NOTES
GROUP THERAPY PROGRESS NOTE     Tenneco Vicki did not participate in process group       Group time: 3:30 pm-4:30 pm     Personal goal for participation: Identify area's of strength and create plan to implement identified strength into day to day      Goal orientation: community     Group therapy participation: not active     Naveed Branch, supervisee in social work

## 2023-03-24 NOTE — GROUP NOTE
DAVE  GROUP DOCUMENTATION INDIVIDUAL                                                                          Group Therapy Note    Date: 3/24/2023    Group Start Time: 1000  Group End Time: 1100  Group Topic: Topic Group    [facility] [place] [address] ACUTE BEHAV [facility]    [place], [address] [facility] GROUP DOCUMENTATION GROUP    Group Therapy Note    Attendees: 5       Attendance: Attended    Patient's Goal:  To participate in chair exercise routine    Interventions/techniques: Supported-benefits of exercise    Follows Directions:  Followed directions    Interactions: Interacted appropriately    Mental Status: Calm    Behavior/appearance: Attentive, Cooperative, and Needed prompting    Goals Achieved: Able to engage in interactions, Able to listen to others, Able to self-disclose, and Discussed coping      Sharyn Schaffer

## 2023-03-25 PROCEDURE — 74011250637 HC RX REV CODE- 250/637: Performed by: PSYCHIATRY & NEUROLOGY

## 2023-03-25 PROCEDURE — 74011250637 HC RX REV CODE- 250/637

## 2023-03-25 PROCEDURE — 65220000003 HC RM SEMIPRIVATE PSYCH

## 2023-03-25 RX ORDER — TRAZODONE HYDROCHLORIDE 100 MG/1
100 TABLET ORAL
Status: DISCONTINUED | OUTPATIENT
Start: 2023-03-25 | End: 2023-03-25

## 2023-03-25 RX ORDER — ESCITALOPRAM OXALATE 10 MG/1
20 TABLET ORAL DAILY
Status: DISCONTINUED | OUTPATIENT
Start: 2023-03-26 | End: 2023-03-28 | Stop reason: HOSPADM

## 2023-03-25 RX ORDER — LAMOTRIGINE 100 MG/1
200 TABLET ORAL 2 TIMES DAILY
Status: DISCONTINUED | OUTPATIENT
Start: 2023-03-25 | End: 2023-03-28 | Stop reason: HOSPADM

## 2023-03-25 RX ORDER — OLANZAPINE 5 MG/1
5 TABLET, ORALLY DISINTEGRATING ORAL
Status: DISCONTINUED | OUTPATIENT
Start: 2023-03-25 | End: 2023-03-26

## 2023-03-25 RX ADMIN — OLANZAPINE 5 MG: 5 TABLET, ORALLY DISINTEGRATING ORAL at 21:34

## 2023-03-25 RX ADMIN — LAMOTRIGINE 200 MG: 100 TABLET ORAL at 21:34

## 2023-03-25 RX ADMIN — TRAZODONE HYDROCHLORIDE 150 MG: 50 TABLET ORAL at 21:34

## 2023-03-25 NOTE — PROGRESS NOTES
Problem: Altered Thought Process (Adult/Pediatric)  Goal: *STG: Participates in treatment plan  Outcome: Progressing Towards Goal  Goal: *STG: Remains safe in hospital  Outcome: Progressing Towards Goal  Goal: *STG: Complies with medication therapy  Outcome: Progressing Towards Goal     Received pt visible in the hallway, alert and oriented x 4. Presents calm and cooperative with dull affect and sad mood. Pt is cooperative with vital signs check and assessment. Pt denies anxiety, depression, SI/HI/AH/VH. Speech is unremarkable. Interacts appropriately and aware. Pain level of 4/10 in knee, PRN Tylenol and Ice pack given with good effect. Med and meal compliant. No scheduled, PRN Atarax and Trazodone given on request.  Every 15 min's rounding is ongoing for safety. Pt had a calm shift with no instance of agitation or aggression. Pt slept for 8 hours.

## 2023-03-25 NOTE — BH NOTES
Psychiatric Progress Note    Patient: Tank Rob MRN: [id number]  SSN: [id number]    YOB: 2000  Age: 23 y.o. Sex: female      Admit Date: 3/24/2023    LOS: 1 day     Subjective:     3/25 -   Tank Rob  reports feeling \"tired\" and affect is anxious. Denies SI/HI. Denies VH. Endorses AH and requests medication for this complaint. No aggression or violence. Appropriately interactive and aware. Tolerating medications well. Eating fair and sleeping poorly. Requested that her lamotrigine which she takes 200 mg BID be restarted. [facility] pharmacy contacted and dosage and frequency confirmed. Will restart tonight. Will confirm tomorrow if home oral hormonal contraceptive is able to be brought to hospital and if not, will inform that dosage reduction of lamotrigine will be necessary. Objective:     Vitals:    03/24/23 0945 03/24/23 1100 03/24/23 1937 03/25/23 0740   BP: 131/76 131/76 102/69 110/63   Pulse: 88 88 90 79   Resp: 18  16 16   Temp: 98.2 °F (36.8 °C)  98.4 °F (36.9 °C) 97.8 °F (36.6 °C)   SpO2:   99% 99%        Mental Status Exam:   Sensorium  oriented to time, place and person   Relations cooperative   Eye Contact appropriate   Appearance:  age appropriate   Speech:  normal volume and non-pressured   Thought Process: goal directed and logical   Thought Content free of delusions.  Positive for auditory hallucinations   Suicidal ideations none   Mood:  anxious   Affect:  constricted and irritable   Memory   adequate   Concentration:  adequate   Insight:  fair   Judgment:  fair       MEDICATIONS:  Current Facility-Administered Medications   Medication Dose Route Frequency    lamoTRIgine (LaMICtal) tablet 200 mg  200 mg Oral BID    [START ON 3/26/2023] escitalopram oxalate (LEXAPRO) tablet 20 mg  20 mg Oral DAILY    traZODone (DESYREL) tablet 150 mg  150 mg Oral QHS PRN    OLANZapine (ZyPREXA zydis) disintegrating tablet 5 mg  5 mg Oral QHS    OLANZapine (ZyPREXA) tablet 5 mg  5 mg Oral Q6H PRN    haloperidol lactate (HALDOL) injection 5 mg  5 mg IntraMUSCular Q6H PRN    benztropine (COGENTIN) tablet 1 mg  1 mg Oral BID PRN    diphenhydrAMINE (BENADRYL) injection 50 mg  50 mg IntraMUSCular BID PRN    hydrOXYzine HCL (ATARAX) tablet 50 mg  50 mg Oral TID PRN    LORazepam (ATIVAN) injection 1 mg  1 mg IntraMUSCular Q4H PRN    acetaminophen (TYLENOL) tablet 650 mg  650 mg Oral Q4H PRN    magnesium hydroxide (MILK OF MAGNESIA) 400 mg/5 mL oral suspension 30 mL  30 mL Oral DAILY PRN    nicotine (NICODERM CQ) 14 mg/24 hr patch 1 Patch  1 Patch TransDERmal DAILY      DISCUSSION:   the risks and benefits of the proposed medication    Lab/Data Review: All lab results for the last 24 hours reviewed. No results found for this or any previous visit (from the past 24 hour(s)). Assessment:     Principal Problem:    Unspecified mood (affective) disorder ([facility] (3/24/2023)        Plan:     Continue current care  Collateral information  Resume lamotrigine 200 mg PO BID, if patient is unable to have home oral contraceptive brought to hospital, reduce dosage by 25% (to 150 mg PO BID) as estrogen containing contraceptives are known to lower lamotrigine levels  Resume lexapro 20 mg PO daily  Scheduled olanzapine 5 mg PO qhs for complaint of auditory hallucinations  Disposition planning with social work    I certify that this patient's inpatient psychiatric hospital services furnished since the previous certification were, and continue to be, required for treatment that could reasonably be expected to improve the patient's condition, or for diagnostic study, and that the patient continues to need, on a daily basis, active treatment furnished directly by or requiring the supervision of inpatient psychiatric facility personnel. In addition, the hospital records show that services furnished were intensive treatment services, admission or related services, or equivalent services.   Signed By: Sydnee Franco NP     March 25, 2023

## 2023-03-25 NOTE — PROGRESS NOTES
Problem: Altered Thought Process (Adult/Pediatric)  Goal: *STG: Participates in treatment plan  Outcome: Progressing Towards Goal    Shift change report given to Iza QUINTANA (oncoming nurse) by Ely Faulkner (offgoing nurse). Report included the following information SBAR, Kardex, MAR, and Recent Results. Assumed care of patient. Met patient in hallway. Calm and cooperative. Denied anxiety, depression, SI, HI and AVH. No complaints of pain. Patient presented with a dull affect and sad mood. Speech and thought process unremarkable. Patient up in room reading throughout shift. No issues noted.
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
## 2023-03-26 PROCEDURE — 74011250637 HC RX REV CODE- 250/637: Performed by: PSYCHIATRY & NEUROLOGY

## 2023-03-26 PROCEDURE — 74011250637 HC RX REV CODE- 250/637

## 2023-03-26 PROCEDURE — 65220000003 HC RM SEMIPRIVATE PSYCH

## 2023-03-26 RX ORDER — OLANZAPINE 5 MG/1
5 TABLET, ORALLY DISINTEGRATING ORAL
Status: DISCONTINUED | OUTPATIENT
Start: 2023-03-26 | End: 2023-03-28 | Stop reason: HOSPADM

## 2023-03-26 RX ORDER — BUPROPION HYDROCHLORIDE 150 MG/1
150 TABLET, EXTENDED RELEASE ORAL ONCE
Status: COMPLETED | OUTPATIENT
Start: 2023-03-26 | End: 2023-03-26

## 2023-03-26 RX ORDER — BUPROPION HYDROCHLORIDE 150 MG/1
150 TABLET, EXTENDED RELEASE ORAL 2 TIMES DAILY
Status: DISCONTINUED | OUTPATIENT
Start: 2023-03-27 | End: 2023-03-28 | Stop reason: HOSPADM

## 2023-03-26 RX ORDER — DM/P-EPHED/ACETAMINOPH/DOXYLAM 30-7.5/3
2 LIQUID (ML) ORAL
Status: DISCONTINUED | OUTPATIENT
Start: 2023-03-26 | End: 2023-03-28 | Stop reason: HOSPADM

## 2023-03-26 RX ORDER — BUPROPION HYDROCHLORIDE 150 MG/1
150 TABLET, EXTENDED RELEASE ORAL 2 TIMES DAILY
Status: DISCONTINUED | OUTPATIENT
Start: 2023-03-27 | End: 2023-03-26

## 2023-03-26 RX ADMIN — Medication 2 MG: at 19:47

## 2023-03-26 RX ADMIN — Medication 2 MG: at 15:45

## 2023-03-26 RX ADMIN — BUPROPION HYDROCHLORIDE 150 MG: 150 TABLET, EXTENDED RELEASE ORAL at 11:15

## 2023-03-26 RX ADMIN — HYDROXYZINE HYDROCHLORIDE 50 MG: 25 TABLET, FILM COATED ORAL at 21:30

## 2023-03-26 RX ADMIN — LAMOTRIGINE 200 MG: 100 TABLET ORAL at 21:29

## 2023-03-26 RX ADMIN — ESCITALOPRAM OXALATE 20 MG: 10 TABLET ORAL at 08:20

## 2023-03-26 RX ADMIN — LAMOTRIGINE 200 MG: 100 TABLET ORAL at 08:20

## 2023-03-26 RX ADMIN — TRAZODONE HYDROCHLORIDE 150 MG: 50 TABLET ORAL at 21:30

## 2023-03-26 RX ADMIN — OLANZAPINE 5 MG: 5 TABLET, ORALLY DISINTEGRATING ORAL at 21:29

## 2023-03-26 NOTE — BH NOTES
Psychiatric Progress Note    Patient: Lexus Ambrose MRN: [id number]  SSN: [id number]    YOB: 2000  Age: 23 y.o. Sex: female      Admit Date: 3/24/2023    LOS: 2 days     Subjective:     3/25 -   Lexus Ambrose  reports feeling \"tired\" and affect is anxious. Denies SI/HI. Denies VH. Endorses AH and requests medication for this complaint. No aggression or violence. Appropriately interactive and aware. Tolerating medications well. Eating fair and sleeping poorly. Requested that her lamotrigine which she takes 200 mg BID be restarted. [facility] pharmacy contacted and dosage and frequency confirmed. Will restart tonight. Will confirm tomorrow if home oral hormonal contraceptive is able to be brought to hospital and if not, will inform that dosage reduction of lamotrigine will be necessary. 3/26- Megan encountered this AM on rounds resting in bed in room. She endorses \"tired\" mood and affect is full. She denies SI/HI/VH. She endorses AH but states these are improved with addition of olanzapine ordered at bedtime. She appropriately requested that her wellbutrin be restarted. She stated that she was taking lamotrigine 200 mg BID even before she was taking hormonal birth control. She stated she would contact mother to see if oral contraceptive could be brought in as she realizes that this can affect lamotrigine levels. Behaviorally appropriate. Eating well, sleeping 8 hours. Objective:     Vitals:    03/24/23 1100 03/24/23 1937 03/25/23 0740 03/25/23 2156   BP: 131/76 102/69 110/63 110/60   Pulse: 88 90 79 66   Resp:  16 16 16   Temp:  98.4 °F (36.9 °C) 97.8 °F (36.6 °C) 98 °F (36.7 °C)   SpO2:  99% 99% 100%        Mental Status Exam:   Sensorium  oriented to time, place and person   Relations cooperative   Eye Contact appropriate   Appearance:  age appropriate   Speech:  normal volume and non-pressured   Thought Process: goal directed and logical   Thought Content free of delusions. Positive for auditory hallucinations   Suicidal ideations none   Mood:  anxious   Affect:  constricted and irritable   Memory   adequate   Concentration:  adequate   Insight:  fair   Judgment:  fair       MEDICATIONS:  Current Facility-Administered Medications   Medication Dose Route Frequency    [START ON 3/27/2023] buPROPion SR (WELLBUTRIN SR) tablet 150 mg  150 mg Oral BID    OLANZapine (ZyPREXA zydis) disintegrating tablet 5 mg  5 mg Oral QHS    lamoTRIgine (LaMICtal) tablet 200 mg  200 mg Oral BID    escitalopram oxalate (LEXAPRO) tablet 20 mg  20 mg Oral DAILY    traZODone (DESYREL) tablet 150 mg  150 mg Oral QHS PRN    OLANZapine (ZyPREXA) tablet 5 mg  5 mg Oral Q6H PRN    haloperidol lactate (HALDOL) injection 5 mg  5 mg IntraMUSCular Q6H PRN    benztropine (COGENTIN) tablet 1 mg  1 mg Oral BID PRN    diphenhydrAMINE (BENADRYL) injection 50 mg  50 mg IntraMUSCular BID PRN    hydrOXYzine HCL (ATARAX) tablet 50 mg  50 mg Oral TID PRN    LORazepam (ATIVAN) injection 1 mg  1 mg IntraMUSCular Q4H PRN    acetaminophen (TYLENOL) tablet 650 mg  650 mg Oral Q4H PRN    magnesium hydroxide (MILK OF MAGNESIA) 400 mg/5 mL oral suspension 30 mL  30 mL Oral DAILY PRN    nicotine (NICODERM CQ) 14 mg/24 hr patch 1 Patch  1 Patch TransDERmal DAILY      DISCUSSION:   the risks and benefits of the proposed medication    Lab/Data Review: All lab results for the last 24 hours reviewed. No results found for this or any previous visit (from the past 24 hour(s)).       Assessment:     Principal Problem:    Unspecified mood (affective) disorder ([facility] [address] (3/24/2023)      Plan:     Continue current care  Collateral information  Continue lamotrigine 200 mg PO BID, if patient is unable to have home oral contraceptive brought to hospital, reduce dosage by 25% (to 150 mg PO BID) as estrogen containing contraceptives are known to lower lamotrigine levels  Continue lexapro 20 mg PO daily  Continue olanzapine 5 mg PO qhs for complaint of auditory hallucinations  Disposition planning with social work    I certify that this patient's inpatient psychiatric hospital services furnished since the previous certification were, and continue to be, required for treatment that could reasonably be expected to improve the patient's condition, or for diagnostic study, and that the patient continues to need, on a daily basis, active treatment furnished directly by or requiring the supervision of inpatient psychiatric facility personnel. In addition, the hospital records show that services furnished were intensive treatment services, admission or related services, or equivalent services.   Signed By: Андрей Zhang NP     March 26, 2023

## 2023-03-26 NOTE — BH NOTES
Patient alert, guarded, cooperative. Denies SI/HI. Denies pain. Denies anxiety and depression. Denies VH. Endorses AH. She told writer, \"The voices are always there. I have Schizophrenia. They're not saying anything specific. \" Medication and meal compliant. No distress observed. No concerns expressed at this time. Q15 minutes checks ongoing.

## 2023-03-26 NOTE — PROGRESS NOTES
Problem: Altered Thought Process (Adult/Pediatric)  Goal: *STG: Participates in treatment plan  Outcome: Progressing Towards Goal  Goal: *STG: Remains safe in hospital  Outcome: Progressing Towards Goal  Goal: *STG: Complies with medication therapy  Outcome: Progressing Towards Goal     Received pt visible in the hallway, alert and oriented x 4. Presents calm and cooperative with dull affect and irritable mood. Pt is angry family did not bring her some clothing. Pt was however cooperative with vital signs check and assessment. Pt denies anxiety, depression, SI/HI/AH/VH. Speech is unremarkable. Interacts appropriately and aware. Pain level of 0/10. Med and meal compliant. Scheduled med, and PRN Trazodone given. Q15 min's rounding is ongoing for safety. Pt had a calm shift with no instance of agitation or aggression. Pt slept for 8 hours.

## 2023-03-27 LAB
CHOLEST SERPL-MCNC: 120 MG/DL
EST. AVERAGE GLUCOSE BLD GHB EST-MCNC: 91 MG/DL
GLUCOSE P FAST SERPL-MCNC: 87 MG/DL (ref 65–100)
HBA1C MFR BLD: 4.8 % (ref 4–5.6)
HDLC SERPL-MCNC: 53 MG/DL
HDLC SERPL: 2.3 (ref 0–5)
LDLC SERPL CALC-MCNC: 57.8 MG/DL (ref 0–100)
SARS-COV-2 RNA RESP QL NAA+PROBE: NOT DETECTED
SOURCE, COVRS: NORMAL
TRIGL SERPL-MCNC: 46 MG/DL (ref ?–150)
VLDLC SERPL CALC-MCNC: 9.2 MG/DL

## 2023-03-27 PROCEDURE — U0005 INFEC AGEN DETEC AMPLI PROBE: HCPCS

## 2023-03-27 PROCEDURE — 80061 LIPID PANEL: CPT

## 2023-03-27 PROCEDURE — 83036 HEMOGLOBIN GLYCOSYLATED A1C: CPT

## 2023-03-27 PROCEDURE — 65220000003 HC RM SEMIPRIVATE PSYCH

## 2023-03-27 PROCEDURE — 74011250637 HC RX REV CODE- 250/637: Performed by: PSYCHIATRY & NEUROLOGY

## 2023-03-27 PROCEDURE — 74011250637 HC RX REV CODE- 250/637

## 2023-03-27 PROCEDURE — 82947 ASSAY GLUCOSE BLOOD QUANT: CPT

## 2023-03-27 RX ADMIN — OLANZAPINE 5 MG: 5 TABLET, ORALLY DISINTEGRATING ORAL at 21:55

## 2023-03-27 RX ADMIN — TRAZODONE HYDROCHLORIDE 150 MG: 50 TABLET ORAL at 21:58

## 2023-03-27 RX ADMIN — Medication 2 MG: at 15:24

## 2023-03-27 RX ADMIN — LAMOTRIGINE 200 MG: 100 TABLET ORAL at 21:55

## 2023-03-27 RX ADMIN — BUPROPION HYDROCHLORIDE 150 MG: 150 TABLET, EXTENDED RELEASE ORAL at 08:34

## 2023-03-27 RX ADMIN — ESCITALOPRAM OXALATE 20 MG: 10 TABLET ORAL at 08:34

## 2023-03-27 RX ADMIN — LAMOTRIGINE 200 MG: 100 TABLET ORAL at 08:34

## 2023-03-27 RX ADMIN — BUPROPION HYDROCHLORIDE 150 MG: 150 TABLET, EXTENDED RELEASE ORAL at 15:21

## 2023-03-27 RX ADMIN — ACETAMINOPHEN 650 MG: 325 TABLET ORAL at 11:41

## 2023-03-27 NOTE — GROUP NOTE
[facility] GROUP DOCUMENTATION INDIVIDUAL                                                                          Group Therapy Note    Date: 3/27/2023    Group Start Time: 1000  Group End Time: 1100  Group Topic: Topic Group    [facility] 3 ACUTE BEHAV [facility], [address] [facility] GROUP DOCUMENTATION GROUP    Group Therapy Note    Attendees: 6       Attendance: Attended session,pleasant,calm-active participant in game      Radha Ann

## 2023-03-27 NOTE — BH NOTES
Psychiatric Progress Note    Patient: Willy Goss MRN: [id number]  SSN: [id number]    YOB: 2000  Age: 23 y.o. Sex: female      Admit Date: 3/24/2023    LOS: 3 days     Subjective:     3/25 -   Willy Goss  reports feeling \"tired\" and affect is anxious. Denies SI/HI. Denies VH. Endorses AH and requests medication for this complaint. No aggression or violence. Appropriately interactive and aware. Tolerating medications well. Eating fair and sleeping poorly. Requested that her lamotrigine which she takes 200 mg BID be restarted. [facility] pharmacy contacted and dosage and frequency confirmed. Will restart tonight. Will confirm tomorrow if home oral hormonal contraceptive is able to be brought to hospital and if not, will inform that dosage reduction of lamotrigine will be necessary. 3/26- Megan encountered this AM on rounds resting in bed in room. She endorses \"tired\" mood and affect is full. She denies SI/HI/VH. She endorses AH but states these are improved with addition of olanzapine ordered at bedtime. She appropriately requested that her wellbutrin be restarted. She stated that she was taking lamotrigine 200 mg BID even before she was taking hormonal birth control. She stated she would contact mother to see if oral contraceptive could be brought in as she realizes that this can affect lamotrigine levels. Behaviorally appropriate. Eating well, sleeping 8 hours. 3/27 - Willy Goss reports the voices are down now and she is feeling well. Was hearing voices saying derogatory and counter derogatory things creating a cacophony of background noise. Currently denies SI/HI/AH/VH. No aggression or violence. Appropriately interactive and aware. Tolerating medications well. Eating and sleeping fairly. Discharge focused, hoping for family meeting and discharge tomorrow as there are more difficulties with Wednesday.     Objective:     Vitals:    03/25/23 0740 03/25/23 9256 03/26/23 2005 03/27/23 0810   BP: 110/63 110/60 (!) 109/55 (!) 99/55   Pulse: 79 66 97 79   Resp: 16 16 16 16   Temp: 97.8 °F (36.6 °C) 98 °F (36.7 °C) 97.8 °F (36.6 °C) 97.9 °F (36.6 °C)   SpO2: 99% 100% 100% 99%        Mental Status Exam:   Sensorium  oriented to time, place and person   Relations cooperative   Eye Contact appropriate   Appearance:  age appropriate   Speech:  normal volume and non-pressured   Thought Process: goal directed and logical   Thought Content free of delusions. Positive for auditory hallucinations   Suicidal ideations none   Mood:  anxious   Affect:  constricted and irritable   Memory   adequate   Concentration:  adequate   Insight:  fair   Judgment:  fair       MEDICATIONS:  Current Facility-Administered Medications   Medication Dose Route Frequency    buPROPion SR (WELLBUTRIN SR) tablet 150 mg  150 mg Oral BID    OLANZapine (ZyPREXA zydis) disintegrating tablet 5 mg  5 mg Oral QHS    nicotine buccal (POLACRILEX) lozenge 2 mg  2 mg Oral Q2H PRN    lamoTRIgine (LaMICtal) tablet 200 mg  200 mg Oral BID    escitalopram oxalate (LEXAPRO) tablet 20 mg  20 mg Oral DAILY    traZODone (DESYREL) tablet 150 mg  150 mg Oral QHS PRN    OLANZapine (ZyPREXA) tablet 5 mg  5 mg Oral Q6H PRN    haloperidol lactate (HALDOL) injection 5 mg  5 mg IntraMUSCular Q6H PRN    benztropine (COGENTIN) tablet 1 mg  1 mg Oral BID PRN    diphenhydrAMINE (BENADRYL) injection 50 mg  50 mg IntraMUSCular BID PRN    hydrOXYzine HCL (ATARAX) tablet 50 mg  50 mg Oral TID PRN    LORazepam (ATIVAN) injection 1 mg  1 mg IntraMUSCular Q4H PRN    acetaminophen (TYLENOL) tablet 650 mg  650 mg Oral Q4H PRN    magnesium hydroxide (MILK OF MAGNESIA) 400 mg/5 mL oral suspension 30 mL  30 mL Oral DAILY PRN    nicotine (NICODERM CQ) 14 mg/24 hr patch 1 Patch  1 Patch TransDERmal DAILY      DISCUSSION:   the risks and benefits of the proposed medication    Lab/Data Review: All lab results for the last 24 hours reviewed.      Recent Results (from the past 24 hour(s))   HEMOGLOBIN A1C WITH EAG    Collection Time: 03/27/23  4:52 AM   Result Value Ref Range    Hemoglobin A1c 4.8 4.0 - 5.6 %    Est. average glucose 91 mg/dL   GLUCOSE, FASTING    Collection Time: 03/27/23  4:52 AM   Result Value Ref Range    Glucose 87 65 - 100 MG/DL   LIPID PANEL    Collection Time: 03/27/23  4:52 AM   Result Value Ref Range    Cholesterol, total 120 <200 MG/DL    Triglyceride 46 <150 MG/DL    HDL Cholesterol 53 MG/DL    LDL, calculated 57.8 0 - 100 MG/DL    VLDL, calculated 9.2 MG/DL    CHOL/HDL Ratio 2.3 0.0 - 5.0           Assessment:     Principal Problem:    Unspecified mood (affective) disorder (HCC) (3/24/2023)      Plan:     Continue current care  Collateral information  Continue lamotrigine 200 mg PO BID, if patient is unable to have home oral contraceptive brought to hospital, reduce dosage by 25% (to 150 mg PO BID) as estrogen containing contraceptives are known to lower lamotrigine levels  Continue lexapro 20 mg PO daily  Continue olanzapine 5 mg PO qhs for complaint of auditory hallucinations  Disposition planning with social work    I certify that this patient's inpatient psychiatric hospital services furnished since the previous certification were, and continue to be, required for treatment that could reasonably be expected to improve the patient's condition, or for diagnostic study, and that the patient continues to need, on a daily basis, active treatment furnished directly by or requiring the supervision of inpatient psychiatric facility personnel. In addition, the hospital records show that services furnished were intensive treatment services, admission or related services, or equivalent services.   Signed By: Neha Koroma MD     March 27, 2023

## 2023-03-27 NOTE — GROUP NOTE
DAVE  GROUP DOCUMENTATION INDIVIDUAL                                                                          Group Therapy Note    Date: 3/27/2023    Group Start Time: 1400  Group End Time: 1500  Group Topic: Recreational/Music Therapy    [facility] 3 ACUTE BEHAV [facility]    [place], [address] [facility] GROUP DOCUMENTATION GROUP    Group Therapy Note    Attendees: 4       Attendance: Attended    Patient's Goal:  To concentrate on selected task    Interventions/techniques: Supported-crafts,games,music    Follows Directions:  Followed directions    Interactions: Interacted appropriately    Mental Status: Calm    Behavior/appearance: Attentive and Cooperative    Goals Achieved: Able to engage in interactions and Able to listen to others-active participant    Emmy Pyle

## 2023-03-27 NOTE — PROGRESS NOTES
Writer met patient in her room. Patient observed to be sitting upright in bed, awake, and appears to be free of distress. Patient calm and cooperative with vital signs and assessment. Patient is A/O x 4 with clear speech and ambulates with a steady gait. Patient is independent of ADLs and hygiene is good. Patient is meal and medication compliant and presents with a flat affect and good mood. Patient endorses auditory hallucinations and denies depression, anxiety, SI, HI ,and VH. Patient is discharge focused and visible in the milieu. Q 15 minute checks continued for safety.    Problem: Discharge Planning  Goal: *Knowledge of medication management  Outcome: Progressing Towards Goal     Problem: Altered Thought Process (Adult/Pediatric)  Goal: *STG: Participates in treatment plan  Outcome: Progressing Towards Goal  Goal: *STG: Remains safe in hospital  Outcome: Progressing Towards Goal  Goal: *STG: Seeks staff when feelings of anxiety and fear arise  Outcome: Progressing Towards Goal  Goal: *STG: Complies with medication therapy  Outcome: Progressing Towards Goal     Problem: Discharge Planning  Goal: *Discharge to safe environment  Outcome: Not Progressing Towards Goal

## 2023-03-27 NOTE — GROUP NOTE
DAVE  GROUP DOCUMENTATION INDIVIDUAL                                                                          Group Therapy Note    Date: 3/27/2023    Group Start Time: 5765  Group End Time: 8722  Group Topic: [employer]    137 [facility] 3 [facility]; [place], [address] [facility] GROUP DOCUMENTATION GROUP    Group Therapy Note    Attendees: 6       Attendance: Attended    Patient's Goal:  go to groups     Interventions/techniques: Informed    Follows Directions:  Followed directions    Interactions: Interacted appropriately    Mental Status: Calm    Behavior/appearance: Attentive and Cooperative    Goals Achieved: Able to engage in interactions      [employer]

## 2023-03-27 NOTE — PROGRESS NOTES
Problem: Depressed Mood (Adult/Pediatric)  Goal: *STG: Participates in treatment plan  Outcome: Progressing Towards Goal  Goal: *STG: Remains safe in hospital  Outcome: Progressing Towards Goal  Goal: *STG: Complies with medication therapy  Outcome: Progressing Towards Goal     Received pt visible in the hallway, alert and oriented x 4. Presents calm and cooperative with dull affect and depressed mood. Pt is angry because she was not allowed to keep some of her belongings with her, was reassured. Pt was cooperative with vital signs check and assessment. Pt endorses anxiety of 3/5, depression of 5/10,and AH, '' voices saying bunch of things , denies SI/HI/VH. Speech is unremarkable. Interacts appropriately and aware. Pain level of 0/10. Med and meal compliant. Scheduled med, and PRN Trazodone given. Q15 min's rounding is ongoing for safety. Pt had a calm shift with no instance of agitation or aggression. Blood samples and Nasal swab collected for investigations. Pt slept for 8 hours.

## 2023-03-27 NOTE — BH NOTES
Behavioral Health Interdisciplinary Rounds     Patient Name: Dylan Medrano  Age: 23 y.o. Room/Bed:  310/01  Primary Diagnosis: Unspecified mood (affective) disorder ([facility] [address]     Progress note: Patient met with this writer. Patient was alert and oriented x4. Patient presented with linear thought process and speech. Patient denied SI/HI/AH/VH. Patient dicussed that her alter identity was the identity that wanted to harm her nephew, but she is not having HI thoughts anymore. Patient reported that she has been in communication with her family and they have safety plan in place in order for her to return, such as not being alone with the baby. Patient have permission to speak with sister in law, Jacob Valdez ([phone]) and mother, Saba Trinidad ([phone]). Patient reported that she sees a psychiatrist through [place] [phone] and plans to continue to follow up with psychiatrist. This writer dicussed [facility] as possible option. Patient displayed interest in program.    This writer referred patient to [facility] program on 3/27/2023. This writer reached out to sister in law for Duty to [place] purposes. This writer informed sister in law of patients HI towards nephew and she reported that she was aware of situation and is comfortable with patient returning to the home. This writer contacted primary contact, patients mother to schedule family meeting prior to discharge. Patients mother reported that she would like patient to return home. Family meeting scheduled via zoom with brother and mother at [email or web address] on 3/28/2023 at 3 pm.     LOS:  3  Expected LOS: 6    Financial concerns/prescription coverage:  no  Family contact: yes      Family requesting physician contact today:  no  Discharge plan: home with PHP referral   Access to weapons: unknown        Outpatient provider(s): [facility]   Patient's preferred phone number for follow up call: [phone]    Participating treatment team members: Indy Cisneros Cayetano Huang in social work, Yasmeen Long MD

## 2023-03-27 NOTE — PROGRESS NOTES
Problem: Discharge Planning  Goal: *Discharge to safe environment  Outcome: Progressing Towards Goal  Note: Patient identifies safe discharge plan  Goal: *Knowledge of medication management  Outcome: Progressing Towards Goal  Note: Patient is agreeable to medication routine      Problem: Discharge Planning  Goal: *Knowledge of discharge instructions   Outcome: Progressing Towards Goal  Note: Patient displays understanding of discharge instructions

## 2023-03-27 NOTE — PROGRESS NOTES
Laboratory monitoring for mood stabilizer and antipsychotics:    Recommended baseline monitoring has been completed based on this patient's current medication regimen. The patient is currently taking the following medication(s):   Current Facility-Administered Medications   Medication Dose Route Frequency    buPROPion SR (WELLBUTRIN SR) tablet 150 mg  150 mg Oral BID    OLANZapine (ZyPREXA zydis) disintegrating tablet 5 mg  5 mg Oral QHS    lamoTRIgine (LaMICtal) tablet 200 mg  200 mg Oral BID    escitalopram oxalate (LEXAPRO) tablet 20 mg  20 mg Oral DAILY    nicotine (NICODERM CQ) 14 mg/24 hr patch 1 Patch  1 Patch TransDERmal DAILY         Height, Weight, BMI Estimation  Estimated body mass index is 26.28 kg/m² as calculated from the following:    Height as of 2/21/23: 170.2 cm (67\"). Weight as of 3/23/23: 76.1 kg (167 lb 12.3 oz). Renal Function, Hepatic Function and Chemistry  Estimated Creatinine Clearance: 115 mL/min (by C-G formula based on SCr of 0.83 mg/dL). Lab Results   Component Value Date/Time    Sodium 138 03/23/2023 06:57 PM    Potassium 3.6 03/23/2023 06:57 PM    Chloride 108 03/23/2023 06:57 PM    CO2 28 03/23/2023 06:57 PM    Anion gap 2 (L) 03/23/2023 06:57 PM    Glucose 87 03/27/2023 04:52 AM    BUN 15 03/23/2023 06:57 PM    Creatinine 0.83 03/23/2023 06:57 PM    BUN/Creatinine ratio 18 03/23/2023 06:57 PM    GFR est AA >60 07/07/2021 10:34 PM    GFR est non-AA >60 07/07/2021 10:34 PM    Calcium 9.3 03/23/2023 06:57 PM    ALT (SGPT) 22 03/23/2023 06:57 PM    Alk.  phosphatase 65 03/23/2023 06:57 PM    Protein, total 7.5 03/23/2023 06:57 PM    Albumin 4.0 03/23/2023 06:57 PM    Globulin 3.5 03/23/2023 06:57 PM    A-G Ratio 1.1 03/23/2023 06:57 PM    Bilirubin, total 0.2 03/23/2023 06:57 PM       Lab Results   Component Value Date/Time    Glucose 87 03/27/2023 04:52 AM       Lab Results   Component Value Date/Time    Hemoglobin A1c 4.8 03/27/2023 04:52 AM       Hematology  Lab Results Component Value Date/Time    WBC 6.1 03/23/2023 06:57 PM    HGB 12.6 03/23/2023 06:57 PM    HCT 40.0 03/23/2023 06:57 PM    PLATELET 020 18/41/2511 06:57 PM    MCV 89.1 03/23/2023 06:57 PM       Lipids  Lab Results   Component Value Date/Time    Cholesterol, total 120 03/27/2023 04:52 AM    HDL Cholesterol 53 03/27/2023 04:52 AM    LDL, calculated 57.8 03/27/2023 04:52 AM    Triglyceride 46 03/27/2023 04:52 AM    CHOL/HDL Ratio 2.3 03/27/2023 04:52 AM       Thyroid Function  Lab Results   Component Value Date/Time    TSH 2.47 03/28/2023 06:18 AM     Vitals  Visit Vitals  BP (!) 109/56 (BP 1 Location: Left upper arm, BP Patient Position: At rest)   Pulse 68   Temp 97.8 °F (36.6 °C)   Resp 16   SpO2 97%       Pregnancy Test  Lab Results   Component Value Date/Time    Pregnancy test,urine (POC) Negative 03/23/2023 07:51 PM       Duc Koroma, PHARMD, BCPS, BCPP  [phone] (pharmacy)

## 2023-03-28 VITALS
TEMPERATURE: 97.8 F | RESPIRATION RATE: 16 BRPM | HEART RATE: 68 BPM | SYSTOLIC BLOOD PRESSURE: 109 MMHG | OXYGEN SATURATION: 97 % | DIASTOLIC BLOOD PRESSURE: 56 MMHG

## 2023-03-28 LAB — TSH SERPL DL<=0.05 MIU/L-ACNC: 2.47 UIU/ML (ref 0.36–3.74)

## 2023-03-28 PROCEDURE — 84443 ASSAY THYROID STIM HORMONE: CPT

## 2023-03-28 PROCEDURE — 74011250637 HC RX REV CODE- 250/637

## 2023-03-28 PROCEDURE — 36415 COLL VENOUS BLD VENIPUNCTURE: CPT

## 2023-03-28 PROCEDURE — 74011250637 HC RX REV CODE- 250/637: Performed by: PSYCHIATRY & NEUROLOGY

## 2023-03-28 RX ORDER — BUPROPION HYDROCHLORIDE 150 MG/1
150 TABLET, EXTENDED RELEASE ORAL 2 TIMES DAILY
Qty: 60 TABLET | Refills: 0 | Status: SHIPPED | OUTPATIENT
Start: 2023-03-28

## 2023-03-28 RX ORDER — TRAZODONE HYDROCHLORIDE 150 MG/1
150 TABLET ORAL
Qty: 30 TABLET | Refills: 0 | Status: SHIPPED | OUTPATIENT
Start: 2023-03-28

## 2023-03-28 RX ORDER — OLANZAPINE 5 MG/1
5 TABLET, ORALLY DISINTEGRATING ORAL
Qty: 30 TABLET | Refills: 0 | Status: SHIPPED | OUTPATIENT
Start: 2023-03-28

## 2023-03-28 RX ORDER — LAMOTRIGINE 200 MG/1
200 TABLET ORAL 2 TIMES DAILY
Qty: 60 TABLET | Refills: 0 | Status: SHIPPED | OUTPATIENT
Start: 2023-03-28

## 2023-03-28 RX ADMIN — ACETAMINOPHEN 650 MG: 325 TABLET ORAL at 08:17

## 2023-03-28 RX ADMIN — HYDROXYZINE HYDROCHLORIDE 50 MG: 25 TABLET, FILM COATED ORAL at 08:17

## 2023-03-28 RX ADMIN — ESCITALOPRAM OXALATE 20 MG: 10 TABLET ORAL at 08:12

## 2023-03-28 RX ADMIN — LAMOTRIGINE 200 MG: 100 TABLET ORAL at 08:12

## 2023-03-28 RX ADMIN — BUPROPION HYDROCHLORIDE 150 MG: 150 TABLET, EXTENDED RELEASE ORAL at 08:12

## 2023-03-28 RX ADMIN — BUPROPION HYDROCHLORIDE 150 MG: 150 TABLET, EXTENDED RELEASE ORAL at 13:10

## 2023-03-28 RX ADMIN — Medication 2 MG: at 12:55

## 2023-03-28 NOTE — GROUP NOTE
DAVE  GROUP DOCUMENTATION INDIVIDUAL                                                                          Group Therapy Note    Date: 3/28/2023    Group Start Time: 1400  Group End Time: 1500  Group Topic: Recreational/Music Therapy    [facility] 3 ACUTE BEHAV [facility]    [place], [address] [facility] GROUP DOCUMENTATION GROUP    Group Therapy Note    Attendees: 9       Attendance: Attended    Patient's Goal:  To concentrate on selected task    Interventions/techniques: Supported-crafts,games,music    Follows Directions:  Followed directions    Interactions: Interacted appropriately    Mental Status: Calm    Behavior/appearance: Attentive and Cooperative    Goals Achieved: Able to engage in interactions and Able to listen to others-active participant in sing along    Emmy Pyle

## 2023-03-28 NOTE — BH NOTES
Behavioral Health Transition Record to Provider    Patient Name: Tank Rob  YOB: 2000  Medical Record Number: [id number]  Date of Admission: 3/24/2023  Date of Discharge: 3/28/2023    Attending Provider: Benita Kay MD  Discharging Provider: Mago Stinson MD  To contact this individual call [phone] and ask the  to page. If unavailable, ask to be transferred to [facility] Provider on call. [facility] Provider will be available on call 24/7 and during holidays. Primary Care Provider: Temo May MD    Allergies   Allergen Reactions    Nsaids (Non-Steroidal Anti-Inflammatory Drug) Other (comments)     gastritis    Tape [Adhesive] Other (comments)     3M clear medical tape       Reason for Admission: Patient was admitted to [facility] unit as a voluntary admission for SI without a plan. Patient identifies as 'they' and and has hx of DID. Patient endorsed HI when another one of their personalities, Amy Dill, comes out. Patient reported that personality, Amy Dill, wants to hurt their 1 month old niece. Patient endorsed AH that ware command in nature. Patient informed her new therapist and was instructed to come to the hospital. Patient reported that she lives at home with brother and niece. Per [facility] note, duty to warn has been completed. Patient currently lives with mother, sister, brother, niece and is employed. Patient has hx of depression, PTSD, anxiety, borderline personality disorder.      Admission Diagnosis: Unspecified mood (affective) disorder ([facility]) [F39]    * No surgery found *    Results for orders placed or performed during the hospital encounter of 03/24/23   SARS-COV-2   Result Value Ref Range    Specimen source Nasopharyngeal      SARS-CoV-2 Not detected NOTD     HEMOGLOBIN A1C WITH EAG   Result Value Ref Range    Hemoglobin A1c 4.8 4.0 - 5.6 %    Est. average glucose 91 mg/dL   GLUCOSE, FASTING   Result Value Ref Range    Glucose 87 65 - 100 MG/DL   LIPID PANEL   Result Value Ref Range    Cholesterol, total 120 <200 MG/DL    Triglyceride 46 <150 MG/DL    HDL Cholesterol 53 MG/DL    LDL, calculated 57.8 0 - 100 MG/DL    VLDL, calculated 9.2 MG/DL    CHOL/HDL Ratio 2.3 0.0 - 5.0     TSH 3RD GENERATION   Result Value Ref Range    TSH 2.47 0.36 - 3.74 uIU/mL       Immunizations administered during this encounter:   Immunization History   Administered Date(s) Administered    COVID-19, MODERNA BLUE border, Primary or Immunocompromised, (age 18y+), IM, 100 mcg/0.5mL 05/13/2021, 06/08/2021    Influenza, AFLURIA (age 10-32 mo), IM, MDV, 0.25 mL, Fluzone (age 10 mo+), AFLURIA (age 1 y+), IM, MDV, 0.5mL 09/13/2020    Td, Adsorbed 08/30/2022       Screening for Metabolic Disorders for Patients on Antipsychotic Medications  (Data obtained from the EMR)    Estimated Body Mass Index  Estimated body mass index is 26.28 kg/m² as calculated from the following:    Height as of 2/21/23: 5' 7\" (1.702 m). Weight as of 3/23/23: 76.1 kg (167 lb 12.3 oz). Vital Signs/Blood Pressure  Visit Vitals  BP (!) 109/56 (BP 1 Location: Left upper arm, BP Patient Position: At rest)   Pulse 68   Temp 97.8 °F (36.6 °C)   Resp 16   SpO2 97%       Blood Glucose/Hemoglobin A1c  Lab Results   Component Value Date/Time    Glucose 87 03/27/2023 04:52 AM       Lab Results   Component Value Date/Time    Hemoglobin A1c 4.8 03/27/2023 04:52 AM        Lipid Panel  Lab Results   Component Value Date/Time    Cholesterol, total 120 03/27/2023 04:52 AM    HDL Cholesterol 53 03/27/2023 04:52 AM    LDL, calculated 57.8 03/27/2023 04:52 AM    Triglyceride 46 03/27/2023 04:52 AM    CHOL/HDL Ratio 2.3 03/27/2023 04:52 AM        Discharge Diagnosis: Please see physicians discharge summary     Discharge Plan: Patient will discharge home with family willing to care for and monitor.  Patient has follow up appointment with therapist tomorrow for appointment at 12 pm  and has been instructed to follow up with [facility] psychiatrist and was referred to [facility] for mental health skill building. Discharge Medication List and Instructions:   Current Discharge Medication List          Unresulted Labs (24h ago, onward)      None          To obtain results of studies pending at discharge, please contact [phone]    Follow-up Information       Follow up With Specialties Details Why [address]  Follow up Please contact number above to get connected with mental health skill builder [phone] Ext.: [phone]    [facility], [place] [address]    [facility]  Follow up on 3/29/2023 [address] provides mental health counseling (one-on-one and group), substance use counseling, and psychiatric medication management and evaluation. A list of [facility] behavioral health departments exclusionary practices is available here. Daily [address] Hours: Monday-Friday 8:00am--4:30pm.         To receive Behavioral Health Services  Initial mental health assessment:  Walk-in screenings are available on Monday, Wednesday, and Thursday mornings. Arrive at 8 a.m. for an initial screening to further connect to services. Tuesday and Friday - Sign in at the registration desk between 8:00am and 4:00pm.  Once registration is completed you will be provided contact information to schedule the assessment. If you prefer to register by phone, call [phone]. Psychiatry appointments are scheduled after the initial screening is completed. New clients will not be receiving medication the same day as the initial screening. Established clients who have already had a mental health intake or been seen in psychiatry within the last year can call [phone] ext. [phone] to schedule an appointment.  [address] [facility] [address]  [phone]    Therapy Appointment with Nikkie Arauz  Follow up on 3/29/2023 Please arrive to online therapy appointment at 12 pm on 3/29/2023 ([phone])    [facility]  Follow up on 3/28/2023 Please contact number above to sherman follow up psychiatry appointment [address], [address] [facility]    [address] [place] [address] Partial Hospitalization Program  Follow up on 3/28/2023 Please contact number above if you are interested in partial hospitalization program focusing on group and individual therapy [id number] [facility]. [address]  Office: [phone]            Advanced Directive:   Does the patient have an appointed surrogate decision maker? No  Does the patient have a Medical Advance Directive? No  Does the patient have a Psychiatric Advance Directive? No  If the patient does not have a surrogate or Medical Advance Directive AND Psychiatric Advance Directive, the patient was offered information on these advance directives Patient will complete at a later time    Patient Instructions: Please continue all medications until otherwise directed by physician. Tobacco Cessation Discharge Plan:   Is the patient a smoker and needs referral for smoking cessation? Refused  Patient referred to the following for smoking cessation with an appointment? Refused     Patient was offered medication to assist with smoking cessation at discharge? Refused  Was education for smoking cessation added to the discharge instructions? Refused    Alcohol/Substance Abuse Discharge Plan:   Does the patient have a history of substance/alcohol abuse and requires a referral for treatment? Refused  Patient referred to the following for substance/alcohol abuse treatment with an appointment? Not applicable  Patient was offered medication to assist with alcohol cessation at discharge? Not applicable  Was education for substance/alcohol abuse added to discharge instructions?  Not applicable    Patient discharged to Home; discussed with patient/caregiver    Callie Butts [facility] in social work

## 2023-03-28 NOTE — GROUP NOTE
DAVE  GROUP DOCUMENTATION INDIVIDUAL                                                                          Group Therapy Note    Date: 3/28/2023    Group Start Time: 0825  Group End Time: 0840  Group Topic: Community Meeting    [facility]    Allie Lew; [place], [address] [facility] GROUP DOCUMENTATION GROUP    Group Therapy Note    Attendees: 10       Attendance: Attended  Patient's Goal:  8.5 To be discharged    Interventions/techniques: Informed    Follows Directions:  Followed directions    Interactions: Interacted appropriately    Mental Status: Calm and Happy    Behavior/appearance: Attentive and Enthusiastic    Goals Achieved: Able to listen to others and Able to receive feedback      Additional Notes:      Liliana Grayson

## 2023-03-28 NOTE — BH NOTES
Behavioral Health Interdisciplinary Rounds     Patient Name: Shan Ruth  Age: 23 y.o. Room/Bed:  310/01  Primary Diagnosis: Unspecified mood (affective) disorder (H[facility])     Progress note: SW conducted family session. Patients family including mother, bother, brother sister in law were present. Patients family reported they are willing for patient to return home. Patients family are all aware of HI statements towards niece and are willing and wanting patient to return home regardless. Patients family reported that patient will not be able to be alone with baby and patient must take medications as administered by mother. Patient agreed and understands that she will not be able to return home if she does not comply with medications. Patients and patients family are unwilling to allow patient to participate in [facility] PHP program due to concerns with work schedule. Patient agreed to follow up with therapist and psychiatrist once discharged. Patient has appointment with therapist tomorrow at 12 pm via zoom. Patient reported she will attend follow up psychiatry appointment. This writer will connect patient with [facility] for [address] [facility] and [facility] for case management. Patient denied SI/HI/AH/VH.  Patients family will pick patient up and transport home today at 5 pm.  .   LOS:  4  Expected LOS: 6    Financial concerns/prescription coverage:  no  Family contact: yes      Family requesting physician contact today:  no  Discharge plan: home with PHP referral   Access to weapons: unknown        Outpatient provider(s): [facility]   Patient's preferred phone number for follow up call: [phone]    Participating treatment team members: Ophelia Deal, supervisee in social work, Malena Carey MD

## 2023-03-28 NOTE — PROGRESS NOTES
Pt screened per LOS policy. No acute nutrition or weight issues identified. Pt meal compliant. Hx notable for for gastritis.     Ht: 5'7\"  Wt: 167 lb 12.3 oz  BMI: 26.28 kg/(m^2) c/w overweight  Est energy needs: 1825 kcal, 65 g protein, 1 mL/kcal fluids  Pt will consume > 75% of meals at follow up 7-10 days  LOS

## 2023-03-28 NOTE — PROGRESS NOTES
2100: Chrissy Aleman is alert and visible in the milieu, sitting in the day room interactive with peers. She was pleasant upon approach. Her hygiene is adequate, and she is independent in ADLs. Pt gait is steady. Megan reports that her sleep and appetite patterns are WNL. She denies SI/HI/ and AVH, and demonstrates no evidence of intent to harm self or others. Pt is compliant with scheduled meds. No PRN meds given at this time. Pt encouraged to continue to participate in care. Will continue to monitor pt with q 15 min checks and hourly nursing rounds. Pt slept for a total of 6.5 hrs.       Problem: Altered Thought Process (Adult/Pediatric)  Goal: *STG: Participates in treatment plan  Outcome: Progressing Towards Goal  Goal: *STG: Remains safe in hospital  Outcome: Progressing Towards Goal  Goal: *STG: Complies with medication therapy  Outcome: Progressing Towards Goal  Goal: *STG: Attends activities and groups  Outcome: Progressing Towards Goal  Goal: *STG: Decreased delusional thinking  Outcome: Progressing Towards Goal  Goal: *STG: Decreased hallucinations  Outcome: Progressing Towards Goal     Problem: Patient Education: Go to Patient Education Activity  Goal: Patient/Family Education  Outcome: Progressing Towards Goal     Problem: Depressed Mood (Adult/Pediatric)  Goal: *STG: Participates in treatment plan  Outcome: Progressing Towards Goal     Problem: Suicide  Goal: *STG: Remains safe in hospital  Outcome: Progressing Towards Goal  Goal: *STG: Seeks staff when feelings of self harm or harm towards others arise  Outcome: Progressing Towards Goal

## 2023-03-28 NOTE — PROGRESS NOTES
Problem: Altered Thought Process (Adult/Pediatric)  Goal: *STG: Participates in treatment plan  Outcome: Progressing Towards Goal  Goal: *STG: Remains safe in hospital  Outcome: Progressing Towards Goal  Goal: *STG: Seeks staff when feelings of anxiety and fear arise  Outcome: Progressing Towards Goal   Patient is viewed in the milieu actively engaging with peers and staff. She is alert and oriented. She's endorsing mild depression and moderate anxiety and was given Atarax this morning. She denies SI/HI/AVH and is meal and medication adherent. Will continue to monitor.
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
## 2023-03-28 NOTE — BH NOTES
DISCHARGE SUMMARY    NAME:Megan Osman  : 2000  MRN: [id number]    The patient Ariana Thompson exhibits the ability to control behavior in a less restrictive environment. Patient's level of functioning is improving. No assaultive/destructive behavior has been observed for the past 24 hours. No suicidal/homicidal threat or behavior has been observed for the past 24 hours. There is no evidence of serious medication side effects. Patient has not been in physical or protective restraints for at least the past 24 hours. If weapons involved, how are they secured? No weapons identified     Is patient aware of and in agreement with discharge plan? Yes, patient will discharge home with family willing to care for and monitor. Patient has follow up appointment with therapist tomorrow for appointment at 12 pm  and has been instructed to follow up with [facility] and was referred to [facility] for mental health skill building. Arrangements for medication:  Prescriptions sent to pharmacy     Copy of discharge instructions to provider?:      Arrangements for transportation home:  mother will transport home     Keep all follow up appointments as scheduled, continue to take prescribed medications per physician instructions.   Mental health crisis number:  [phone] or your local mental health crisis line number at   [place] [address] Emergency WARM LINE      [phone] ([phone])      M-F: 9am to 9pm      Sat & Sun: [facility] suicide prevention lines:                             8-232-[phone] ([phone])       [phone] ([phone])    Crisis Text Line:  Text HOME to [address] [place] [address], supervisee in social work

## 2023-03-28 NOTE — BH NOTES
Patient alert and verbal. Discharged to parent's home to continue recommended plan of care. Discharge instructions reviewed with the pt. Patient verbalized understanding. Patients belongings, were returned. Patient ambulated with [facility] staff member to the ED entrance to their ride home with their mom. Pt denies si/hi/ah/vh and is Aox4.

## 2023-03-28 NOTE — GROUP NOTE
DAVE  GROUP DOCUMENTATION INDIVIDUAL                                                                          Group Therapy Note    Date: 3/28/2023    Group Start Time: 1000  Group End Time: 1100  Group Topic: Topic Group    [facility] 3 ACUTE BEHAV [facility]    [place], [address] GROUP DOCUMENTATION GROUP    Group Therapy Note    Attendees: 6       Attendance: Attended    Patient's Goal:  To participate in chair exercise routine    Interventions/techniques: Supported-benefits of exercise    Follows Directions:  Followed directions    Interactions: Interacted appropriately    Mental Status: Calm    Behavior/appearance: Attentive, Cooperative, and Needed prompting    Goals Achieved: Able to engage in interactions, Able to listen to others, Able to self-disclose, and Discussed coping      Lelon Frames

## 2023-03-28 NOTE — BH NOTES
Psychiatric Progress Note    Patient: Aamir Del Toro MRN: [id number]  SSN: [id number]    YOB: 2000  Age: 23 y.o. Sex: female      Admit Date: 3/24/2023    LOS: 4 days     Subjective:     3/25 -   Aamir Del Toro  reports feeling \"tired\" and affect is anxious. Denies SI/HI. Denies VH. Endorses AH and requests medication for this complaint. No aggression or violence. Appropriately interactive and aware. Tolerating medications well. Eating fair and sleeping poorly. Requested that her lamotrigine which she takes 200 mg BID be restarted. [facility] pharmacy contacted and dosage and frequency confirmed. Will restart tonight. Will confirm tomorrow if home oral hormonal contraceptive is able to be brought to hospital and if not, will inform that dosage reduction of lamotrigine will be necessary. 3/26- Megan encountered this AM on rounds resting in bed in room. She endorses \"tired\" mood and affect is full. She denies SI/HI/VH. She endorses AH but states these are improved with addition of olanzapine ordered at bedtime. She appropriately requested that her wellbutrin be restarted. She stated that she was taking lamotrigine 200 mg BID even before she was taking hormonal birth control. She stated she would contact mother to see if oral contraceptive could be brought in as she realizes that this can affect lamotrigine levels. Behaviorally appropriate. Eating well, sleeping 8 hours. 3/27 - Aamir Del Toro reports the voices are down now and she is feeling well. Was hearing voices saying derogatory and counter derogatory things creating a cacophony of background noise. Currently denies SI/HI/AH/VH. No aggression or violence. Appropriately interactive and aware. Tolerating medications well. Eating and sleeping fairly. Discharge focused, hoping for family meeting and discharge tomorrow as there are more difficulties with Wednesday.     3/28 - Aamir Del Toro reports feeling anxious about discharge planning. Minimal depression (2/10). Bright affect. Moods are good. Denies SI/HI/AH/VH. No aggression or violence. Appropriately interactive and aware. Tolerating medications well (Atarax prn). Family meeting today. Eating and sleeping fairly (6.5 hrs). Objective:     Vitals:    03/26/23 2005 03/27/23 0810 03/27/23 1959 03/28/23 0834   BP: (!) 109/55 (!) 99/55 105/71 (!) 109/56   Pulse: 97 79 73 68   Resp: 16 16 16 16   Temp: 97.8 °F (36.6 °C) 97.9 °F (36.6 °C) 98.5 °F (36.9 °C) 97.8 °F (36.6 °C)   SpO2: 100% 99% 100% 97%        Mental Status Exam:   Sensorium  oriented to time, place and person   Relations cooperative   Eye Contact appropriate   Appearance:  age appropriate   Speech:  normal volume and non-pressured   Thought Process: goal directed and logical   Thought Content free of delusions.  Positive for auditory hallucinations   Suicidal ideations none   Mood:  anxious   Affect:  constricted and irritable   Memory   adequate   Concentration:  adequate   Insight:  fair   Judgment:  fair       MEDICATIONS:  Current Facility-Administered Medications   Medication Dose Route Frequency    buPROPion SR (WELLBUTRIN SR) tablet 150 mg  150 mg Oral BID    OLANZapine (ZyPREXA zydis) disintegrating tablet 5 mg  5 mg Oral QHS    nicotine buccal (POLACRILEX) lozenge 2 mg  2 mg Oral Q2H PRN    lamoTRIgine (LaMICtal) tablet 200 mg  200 mg Oral BID    escitalopram oxalate (LEXAPRO) tablet 20 mg  20 mg Oral DAILY    traZODone (DESYREL) tablet 150 mg  150 mg Oral QHS PRN    OLANZapine (ZyPREXA) tablet 5 mg  5 mg Oral Q6H PRN    haloperidol lactate (HALDOL) injection 5 mg  5 mg IntraMUSCular Q6H PRN    benztropine (COGENTIN) tablet 1 mg  1 mg Oral BID PRN    diphenhydrAMINE (BENADRYL) injection 50 mg  50 mg IntraMUSCular BID PRN    hydrOXYzine HCL (ATARAX) tablet 50 mg  50 mg Oral TID PRN    LORazepam (ATIVAN) injection 1 mg  1 mg IntraMUSCular Q4H PRN    acetaminophen (TYLENOL) tablet 650 mg  650 mg Oral Q4H PRN    magnesium hydroxide (MILK OF MAGNESIA) 400 mg/5 mL oral suspension 30 mL  30 mL Oral DAILY PRN    nicotine (NICODERM CQ) 14 mg/24 hr patch 1 Patch  1 Patch TransDERmal DAILY      DISCUSSION:   the risks and benefits of the proposed medication    Lab/Data Review: All lab results for the last 24 hours reviewed. Recent Results (from the past 24 hour(s))   TSH 3RD GENERATION    Collection Time: 03/28/23  6:18 AM   Result Value Ref Range    TSH 2.47 0.36 - 3.74 uIU/mL         Assessment:     Principal Problem:    Unspecified mood (affective) disorder (HCC) (3/24/2023)      Plan:     Continue current care  Collateral information  Continue lamotrigine 200 mg PO BID, if patient is unable to have home oral contraceptive brought to hospital, reduce dosage by 25% (to 150 mg PO BID) as estrogen containing contraceptives are known to lower lamotrigine levels  Continue lexapro 20 mg PO daily  Continue olanzapine 5 mg PO qhs for complaint of auditory hallucinations  Disposition planning with social work for the next few days    I certify that this patient's inpatient psychiatric hospital services furnished since the previous certification were, and continue to be, required for treatment that could reasonably be expected to improve the patient's condition, or for diagnostic study, and that the patient continues to need, on a daily basis, active treatment furnished directly by or requiring the supervision of inpatient psychiatric facility personnel. In addition, the hospital records show that services furnished were intensive treatment services, admission or related services, or equivalent services.   Signed By: Amanda Huerta MD     March 28, 2023

## 2023-04-03 ENCOUNTER — HOSPITAL ENCOUNTER (EMERGENCY)
Age: 23
Discharge: HOME | End: 2023-04-03
Attending: EMERGENCY MEDICINE
Payer: OTHER GOVERNMENT

## 2023-04-03 DIAGNOSIS — B97.89 VIRAL LARYNGITIS: Primary | ICD-10-CM

## 2023-04-03 DIAGNOSIS — J04.0 VIRAL LARYNGITIS: Primary | ICD-10-CM

## 2023-04-03 LAB — DEPRECATED S PYO AG THROAT QL EIA: NEGATIVE

## 2023-04-03 PROCEDURE — 87070 CULTURE OTHR SPECIMN AEROBIC: CPT

## 2023-04-03 PROCEDURE — 99283 EMERGENCY DEPT VISIT LOW MDM: CPT

## 2023-04-03 PROCEDURE — 87880 STREP A ASSAY W/OPTIC: CPT

## 2023-04-03 NOTE — ED PROVIDER NOTES
Nurys Case is a 23 y.o. female with Hx of ADHD, anxiety, borderline personality, headache, PTSD who presents self to [address] [facility] ED with cc of sore throat. Patient states she started with a sore throat and was 2 weeks ago and progressively is gotten worse and now she is lost her voice. She would like to be checked for strep throat. She denies fever. She denies nausea and vomiting. Denies difficulty swallowing. PCP: Other, MD Temo    There are no other complaints, changes or physical findings at this time. HPI     Past Medical History:   Diagnosis Date    ADHD     Anxiety     Anxiety     Borderline personality disorder ([facility] [address]     Depression     Headache     Multiple personality disorder (HCC)     PTSD (post-traumatic stress disorder)     Reactive attachment disorder        No past surgical history on file. History reviewed. No pertinent family history.     Social History     Socioeconomic History    Marital status: SINGLE     Spouse name: Not on file    Number of children: Not on file    Years of education: Not on file    Highest education level: Not on file   Occupational History    Not on file   Tobacco Use    Smoking status: Former     Types: Cigarettes     Quit date: 2021     Years since quittin.5    Smokeless tobacco: Current   Vaping Use    Vaping Use: Never used   Substance and Sexual Activity    Alcohol use: Yes     Comment: once a week    Drug use: Not Currently     Types: Marijuana    Sexual activity: Not on file   Other Topics Concern    Not on file   Social History Narrative    Not on file     Social Determinants of Health     Financial Resource Strain: Low Risk     Difficulty of Paying Living Expenses: Not hard at all   Food Insecurity: No Food Insecurity    Worried About Running Out of Food in the Last Year: Never true    Ran Out of Food in the Last Year: Never true   Transportation Needs: Not on file   Physical Activity: Not on file   Stress: Not on file   Social Connections: Not on file   Intimate Partner Violence: Not on file   Housing Stability: Not on file         ALLERGIES: Nsaids (non-steroidal anti-inflammatory drug) and Tape [adhesive]    Review of Systems   Constitutional:  Negative for activity change, appetite change, chills and fever. HENT:  Positive for sore throat and voice change. Negative for congestion and rhinorrhea. Eyes:  Negative for visual disturbance. Respiratory:  Negative for cough and shortness of breath. Cardiovascular:  Negative for chest pain. Gastrointestinal:  Negative for abdominal distention, diarrhea, nausea and vomiting. Genitourinary:  Negative for difficulty urinating, dysuria and menstrual problem. Musculoskeletal:  Negative for arthralgias, gait problem and myalgias. Skin:  Negative for color change and rash. Neurological:  Negative for weakness and headaches. Psychiatric/Behavioral:  Negative for agitation, behavioral problems and confusion. Vitals:    04/03/23 0748   Pulse: 99   Resp: 18   Temp: 98.4 °F (36.9 °C)   SpO2: 100%   Weight: 78.5 kg (173 lb)   Height: 5' 7\" (1.702 m)            Physical Exam  Vitals and nursing note reviewed. Constitutional:       Appearance: Normal appearance. HENT:      Head: Normocephalic and atraumatic. Right Ear: Tympanic membrane, ear canal and external ear normal.      Left Ear: Tympanic membrane, ear canal and external ear normal.      Nose: No congestion or rhinorrhea. Mouth/Throat:      Mouth: Mucous membranes are moist. No oral lesions. Pharynx: No pharyngeal swelling, oropharyngeal exudate, posterior oropharyngeal erythema or uvula swelling. Tonsils: No tonsillar exudate or tonsillar abscesses. Eyes:      Extraocular Movements: Extraocular movements intact. Conjunctiva/sclera: Conjunctivae normal.      Pupils: Pupils are equal, round, and reactive to light. Cardiovascular:      Rate and Rhythm: Normal rate and regular rhythm.    Pulmonary: Effort: Pulmonary effort is normal.      Breath sounds: Normal breath sounds. Abdominal:      General: Abdomen is flat. Palpations: Abdomen is soft. Musculoskeletal:         General: Normal range of motion. Cervical back: Normal range of motion and neck supple. Skin:     General: Skin is warm and dry. Neurological:      General: No focal deficit present. Mental Status: She is alert and oriented to person, place, and time. Mental status is at baseline. Psychiatric:         Mood and Affect: Mood normal.         Thought Content: Thought content normal.         Judgment: Judgment normal.        Medical Decision Making  Differential Diagnosis; strep throat versus viral laryngitis    Obtain a strep swab in the emergency room discussed with patient this likely this is a viral illness patient with no fever. Take Tylenol and Motrin drink plenty of water follow-up with PCP as needed. Strep was read as negative. Amount and/or Complexity of Data Reviewed  External Data Reviewed:      Details: homicidal ideation  chronic knee pain  Labs: ordered. Decision-making details documented in ED Course. Risk  Risk Details: The patient ultiumately did not warrant hospitalization nor any acute surgical intervention, I considered the need for both. Also considered the need to obtain additional imaging and/or labs beyond what may have been ordered but no additional testing was indicated based on the patient's condition and results of any other testing that may have been performed. Any medications given here and/or prescribed for discharge are documented within the other portions of the note. After any medications or treatments that may have been provided here the patient was improved and symptoms had resolved or become tolerable or no medications or treatments were indicated based on the patient's condition.   The patient was deemed stable safe and appropriate for discharge to home and is instructed to follow-up with his primary care doctor and return for any new or worsening symptoms.            Procedures

## 2023-04-03 NOTE — DISCHARGE INSTRUCTIONS
Thank you for coming to the Emergency Department. This is most likely a viral laryngitis. Please drink plenty of fluids. Tylenol and Motrin as needed. And follow-up primary care as needed.

## 2023-04-03 NOTE — ED TRIAGE NOTES
Pt arrives to ER ambulatory with c/o sore throat, hard to swallow and dry cough x 2 weeks with symptoms getting worse.

## 2023-04-05 ENCOUNTER — APPOINTMENT (OUTPATIENT)
Dept: GENERAL RADIOLOGY | Age: 23
End: 2023-04-05
Attending: EMERGENCY MEDICINE
Payer: OTHER GOVERNMENT

## 2023-04-05 ENCOUNTER — HOSPITAL ENCOUNTER (OUTPATIENT)
Age: 23
End: 2023-04-05
Attending: EMERGENCY MEDICINE
Payer: OTHER GOVERNMENT

## 2023-04-05 LAB
BACTERIA SPEC CULT: NORMAL
SERVICE CMNT-IMP: NORMAL

## 2023-04-05 PROCEDURE — 73070 X-RAY EXAM OF ELBOW: CPT

## 2023-04-05 PROCEDURE — 74011250637 HC RX REV CODE- 250/637: Performed by: EMERGENCY MEDICINE

## 2023-04-05 PROCEDURE — 73110 X-RAY EXAM OF WRIST: CPT

## 2023-04-05 PROCEDURE — 99283 EMERGENCY DEPT VISIT LOW MDM: CPT

## 2023-04-05 PROCEDURE — 75810000053 HC SPLINT APPLICATION

## 2023-04-05 RX ORDER — ACETAMINOPHEN 500 MG
1000 TABLET ORAL
Status: COMPLETED
Start: 2023-04-05 | End: 2023-04-05

## 2023-04-05 RX ADMIN — ACETAMINOPHEN 1000 MG: 500 TABLET ORAL at 21:27

## 2023-04-05 NOTE — Clinical Note
[address]  [facility] EMERGENCY DEPT  [address] [id number] [phone]    Work/School Note    Date: 4/5/2023    To Whom It May concern:    He Diego was seen and treated today in the emergency room by the following provider(s):  Attending Provider: Kodak Chang MD.      He Diego is excused from work/school on 04/05/23 and 04/06/23. She is medically clear to return to work/school on 4/7/2023.        Sincerely,          Evy Gilbert MD

## 2023-04-05 NOTE — Clinical Note
[address]  [facility]ERGENC[facility] DEPT  [address] [id number] [phone]    Work/School Note    Date: 4/5/2023    To Whom It May concern:    He Diego was seen and treated today in the emergency room by the following provider(s):  Attending Provider: Lorelei Samuels MD.      He Diego is excused from work/school on 04/05/23 and 04/06/23. She is medically clear to return to work/school on 4/7/2023.        Sincerely,          Jeremiah Benites MD

## 2023-04-06 ENCOUNTER — OFFICE VISIT (OUTPATIENT)
Dept: ORTHOPEDIC SURGERY | Age: 23
End: 2023-04-06
Payer: OTHER GOVERNMENT

## 2023-04-06 PROCEDURE — 75810000053 HC SPLINT APPLICATION

## 2023-04-06 PROCEDURE — 99203 OFFICE O/P NEW LOW 30 MIN: CPT | Performed by: PHYSICIAN ASSISTANT

## 2023-04-06 NOTE — ED PROVIDER NOTES
Yann Zapata is a 23 y.o. female with a history as below who presents to the ED complaining of left elbow pain and decreased range of motion status post fall on outstretched bilateral hands when she fell skateboarding tonight. No head trauma or loss of consciousness. No other injury or complaints. No numbness. Past Medical History:   Diagnosis Date    ADHD     Anxiety     Anxiety     Borderline personality disorder ([address]     Depression     Headache     Multiple personality disorder (HCC)     PTSD (post-traumatic stress disorder)     Reactive attachment disorder        No past surgical history on file. No family history on file.     Social History     Socioeconomic History    Marital status: SINGLE     Spouse name: Not on file    Number of children: Not on file    Years of education: Not on file    Highest education level: Not on file   Occupational History    Not on file   Tobacco Use    Smoking status: Former     Types: Cigarettes     Quit date: 2021     Years since quittin.5    Smokeless tobacco: Current   Vaping Use    Vaping Use: Never used   Substance and Sexual Activity    Alcohol use: Yes     Comment: once a week    Drug use: Not Currently     Types: Marijuana    Sexual activity: Not on file   Other Topics Concern    Not on file   Social History Narrative    Not on file     Social Determinants of Health     Financial Resource Strain: Low Risk     Difficulty of Paying Living Expenses: Not hard at all   Food Insecurity: No Food Insecurity    Worried About Running Out of Food in the Last Year: Never true    Ran Out of Food in the Last Year: Never true   Transportation Needs: Not on file   Physical Activity: Not on file   Stress: Not on file   Social Connections: Not on file   Intimate Partner Violence: Not on file   Housing Stability: Not on file         ALLERGIES: Nsaids (non-steroidal anti-inflammatory drug) and Tape [adhesive]    Review of Systems  See HPI for relevant review of systems. Vitals:    04/05/23 2052   BP: 108/69   Pulse: 84   Resp: 18   Temp: 98.3 °F (36.8 °C)   SpO2: 98%   Weight: 78.5 kg (173 lb)   Height: 5' 7\" (1.702 m)            Physical Exam  Vitals and nursing note reviewed. Constitutional:       General: She is not in acute distress. HENT:      Head: Normocephalic and atraumatic. Cardiovascular:      Rate and Rhythm: Normal rate and regular rhythm. Pulmonary:      Effort: No respiratory distress. Musculoskeletal:      Left shoulder: Normal.      Left upper arm: Normal.      Left elbow: Swelling present. Decreased range of motion. Tenderness present. Left forearm: Normal.      Left wrist: Normal. Normal pulse. Left hand: Normal strength. Normal sensation. There is no disruption of two-point discrimination. Normal capillary refill. Normal pulse. Neurological:      General: No focal deficit present. Mental Status: She is alert. Medical Decision Making  Amount and/or Complexity of Data Reviewed  Labs: ordered. Radiology: ordered. Risk  OTC drugs. Vital Signs: I independently reviewed the patients vital signs, which were within normal limits and stable. Pulse Oximetry Interpretation:  Pulse Oximetry  O2 source: room air  O2 Sat: 98%  Interpretation: Normal per Gadiel Edmond MD    Nursing Notes: I independently reviewed and utilized the nursing notes. Old Medical Records: I independently reviewed the patients available external past medical records and past encounters. Differential Diagnoses: My differential diagnosis considered included, but was not limited to:   fracture  Contusion  Strain  Sprain  dislocation    ED Course/Updates:  I independently ordered, and the patient was given:  Medications   acetaminophen (TYLENOL) tablet 1,000 mg (1,000 mg Oral Given 4/5/23 2127)        Diagnostic laboratory and/or imaging tests were ordered, reviewed and independently interpreted by me, as above.   I discussed the history, exam, findings, and plan with with Dr. Beather Nyhan (orthopedist on call). Reviewed history and imaging. Agrees with plan for splint and outpatient follow-up. Recommends sling. I discussed with the patient the ED findings and plan for discharge, follow up with ortho, advised acetaminophen (Tylenol®), ibuprofen (Motrin®, Advil®, etc) , rest, ice, compression, elevation, and with instructions to return to the ED for any new or worsening symptoms. The patient verbalized understanding and agreement with the plan and all questions were answered. Clinical Impression(s):  1. Effusion, left elbow    2. Injury while skateboarding        Disposition:  Discharged  -------------------------------------------------------------------   Dictation software may have been used to aid in this documentation; dictation errors may exist as a result. Splint, Long Arm    Date/Time: 4/6/2023 12:01 AM  Performed by: Jimmy Vitale MD  Authorized by:  Jimmy Vitale MD     Consent:     Consent obtained:  Verbal    Consent given by:  Patient    Risks, benefits, and alternatives were discussed: yes      Risks discussed:  Discoloration, numbness and swelling    Alternatives discussed:  Delayed treatment, alternative treatment, observation, no treatment and referral  Universal protocol:     Procedure explained and questions answered to patient or proxy's satisfaction: yes      Relevant documents present and verified: yes      Test results available: yes      Imaging studies available: yes      Required blood products, implants, devices, and special equipment available: yes      Site/side marked: yes      Immediately prior to procedure a time out was called: yes      Patient identity confirmed:  Verbally with patient  Pre-procedure details:     Distal neurologic exam:  Normal    Distal perfusion: distal pulses strong and brisk capillary refill    Procedure details:     Location:  Elbow    Elbow location:  L elbow    Splint type:  Long arm Supplies:  Fiberglass    Attestation: Splint applied and adjusted personally by me    Post-procedure details:     Distal neurologic exam:  Normal    Distal perfusion: distal pulses strong and brisk capillary refill      Procedure completion:  Tolerated well, no immediate complications

## 2023-04-06 NOTE — PROGRESS NOTES
HPI: Bryant Gallegos (: 2000) is a 23 y.o. female, patient, here for evaluation of the following chief complaint(s): Patient presents with complaint of left elbow pain. Less than 24 hours ago, she fell off her skateboard landing on her left side. Her arm was in full extension when she came down. She had immediate pain at the left elbow. She presented to the ED for evaluation. Per x-ray report and images reviewed, there is no apparent fracture, however there is a lot of soft tissue swelling possibly indicating an occult fracture. The patient was immobilized in a long arm splint, given a sling and ultimately referred to us. The radiologist recommended getting 3 views at a later time. Patient stated the x-rays she had taken in the ED caused severe pain. No numbness or tingling in the hand. No other complaints or concerns. X-rays of the left elbow available for review in PACS. Elbow Pain (Left)       Vitals:  Ht 5' 7\" (1.702 m)   Wt 173 lb (78.5 kg)   LMP 2023   BMI 27.10 kg/m²    Body mass index is 27.1 kg/m². Allergies   Allergen Reactions    Nsaids (Non-Steroidal Anti-Inflammatory Drug) Other (comments)     gastritis    Tape [Adhesive] Other (comments)     3M clear medical tape       Current Outpatient Medications   Medication Sig    methylPREDNISolone (MEDROL DOSEPACK) 4 mg tablet Per dose pack instructions    buPROPion SR (WELLBUTRIN SR) 150 mg SR tablet Take 1 Tablet by mouth two (2) times a day. Indications: major depressive disorder    lamoTRIgine (LaMICtal) 200 mg tablet Take 1 Tablet by mouth two (2) times a day. Indications: bipolar depression    traZODone (DESYREL) 150 mg tablet Take 1 Tablet by mouth nightly as needed for Sleep (For insomnia). Indications: insomnia associated with depression    OLANZapine (ZyPREXA zydis) 5 mg disintegrating tablet Take 1 Tablet by mouth nightly. Indications: hallucinations    Lactobac no.41/Bifidobact no.7 (PROBIOTIC-10 PO) Take  by mouth nightly. pantoprazole (PROTONIX) 20 mg tablet Take 1 Tablet by mouth daily. levonorgestrel-ethinyl estradiol (AVIANE, ALESSE, LESSINA) 0.1-20 mg-mcg tab Take 1 Tablet by mouth daily. escitalopram oxalate (Lexapro) 20 mg tablet Take 1 Tab by mouth daily. No current facility-administered medications for this visit. Past Medical History:   Diagnosis Date    ADHD     Anxiety     Anxiety     Borderline personality disorder ([address]     Depression     Headache     Multiple personality disorder (HCC)     PTSD (post-traumatic stress disorder)     Reactive attachment disorder         History reviewed. No pertinent surgical history. History reviewed. No pertinent family history. Social History     Tobacco Use    Smoking status: Former     Types: Cigarettes     Quit date: 2021     Years since quittin.5    Smokeless tobacco: Current   Vaping Use    Vaping Use: Never used   Substance Use Topics    Alcohol use: Yes     Comment: once a week    Drug use: Not Currently     Types: Marijuana        Review of Systems    Constitutional: No fevers, chills, night sweats, excessive fatigue or weight loss. Musculoskeletal: + Left elbow pain. Neurologic: No headache, blurred vision, and no areas of focal weakness or numbness. Normal gait. No sensory problems. Respiratory: No dyspnea on exertion, orthopnea, chest pain, cough or hemoptysis. Cardiovascular: No anginal chest pain, irregular heart beat, tachycardia, palpitations or orthopnea  Integumentary: No chronic rashes, inflammation, ulcerations, pruritus, petechiae, purpura, ecchymoses, or skin changes      Physical Exam    General: Alert, cooperative, no distress  Musculosketal: Left elbow - Normal range of motion. Normal sensation. No erythema or warmth to the joint. Diffuse swelling at the elbow. No ecchymosis appreciated. Negative hook test. No sign of triceps tendon disruption. Tenderness to palpation at the triceps tendon.    Neurologic:  CNII-XII intact, Normal strength, sensation, and reflexes throughout    XR Results (most recent):  Results from Hospital Encounter encounter on 04/05/23    XR ELBOW LT AP/LAT    Narrative  EXAM: XR ELBOW LT AP/LAT    INDICATION: fall, pain. COMPARISON: None. FINDINGS: Two views of the left elbow demonstrate a large elbow joint effusion. No definite cortical fracture is visualized. .    Impression  Large elbow joint effusion, suggesting an intra-articular or  periarticular fracture, statistically most likely at the radial head or neck. Further evaluation could include a standard 3 view examination if clinically  desired. .     ASSESSMENT/PLAN:  Below is the assessment and plan developed based on review of pertinent history, physical exam, labs, studies, and medications. Left elbow pain. Less than 24 hours ago, she fell off her skateboard landing on her left side. Her arm was in full extension when she came down. She had immediate pain at the left elbow. She presented to the ED for evaluation. Per x-ray report and images reviewed, there is no apparent fracture, however there is a lot of soft tissue swelling possibly indicating an occult fracture. The patient was immobilized in a long arm splint, given a sling and ultimately referred to us. The radiologist recommended getting 3 views at a later time. Patient stated the x-rays she had taken in the ED caused severe pain. The splint was removed and the elbow was rewrapped in an ACE bandage and the sling was placed. She had good pain control in that position. She will follow up in one week for repeat x-rays as that will allow time for swelling to go down and pain to decrease. She was encouraged to occasionally come out of the sling and engage in gentle range of motion exercises as tolerated to avoid severe stiffness. She agreed. In the meantime, she was given an prescription for a steroid.      1. Contusion of left elbow, initial encounter  -     methylPREDNISolone (MEDROL DOSEPACK) 4 mg tablet; Per dose pack instructions, Normal, Disp-1 Dose Pack, R-0  2. Left elbow pain  3. Elbow sprain, left, initial encounter      Return in about 1 week (around 4/13/2023). Dr. Royal Merlin was available for immediate consult during this encounter. An electronic signature was used to authenticate this note.   -- Donald Vela PA-C

## 2023-04-06 NOTE — ED TRIAGE NOTES
Patient with left arm pain and difficulty bending elbow after falling off a skateboard this evening. Positive left radial pulse, cap refill <3 sec.      Patient noted to be eating a sandwich in triage,

## 2023-04-06 NOTE — DISCHARGE INSTRUCTIONS
You can take up to:  600mg ibuprofen (3 regular Motrin®, Advil®, etc) every 6 hours, and   1,000mg acetaminophen (2 extra strength Tylenol®) every 6 hours for pain for the next several days. Some people like alternate them (such as ibuprofen at 6am, acetaminophen at 9am, ibuprofen at noon, and so on). DO NOT take any other medications with acetaminophen, ibuprofen, naproxen, or aspirin.

## 2023-05-18 ENCOUNTER — OFFICE VISIT (OUTPATIENT)
Age: 23
End: 2023-05-18
Payer: COMMERCIAL

## 2023-05-18 VITALS
BODY MASS INDEX: 28.25 KG/M2 | HEART RATE: 82 BPM | HEIGHT: 67 IN | RESPIRATION RATE: 16 BRPM | OXYGEN SATURATION: 92 % | TEMPERATURE: 98.2 F | DIASTOLIC BLOOD PRESSURE: 69 MMHG | SYSTOLIC BLOOD PRESSURE: 108 MMHG | WEIGHT: 180 LBS

## 2023-05-18 DIAGNOSIS — Z87.42 HISTORY OF OVARIAN CYST: ICD-10-CM

## 2023-05-18 DIAGNOSIS — R10.31 RIGHT LOWER QUADRANT ABDOMINAL PAIN: Primary | ICD-10-CM

## 2023-05-18 LAB
BILIRUBIN, URINE, POC: NEGATIVE
BLOOD URINE, POC: NEGATIVE
GLUCOSE URINE, POC: NEGATIVE
HCG, PREGNANCY, URINE, POC: NEGATIVE
KETONES, URINE, POC: NEGATIVE
LEUKOCYTE ESTERASE, URINE, POC: NEGATIVE
NITRITE, URINE, POC: NEGATIVE
PH, URINE, POC: 5.5 (ref 4.6–8)
PROTEIN,URINE, POC: NORMAL
SPECIFIC GRAVITY, URINE, POC: 1.03 (ref 1–1.03)
URINALYSIS CLARITY, POC: CLEAR
URINALYSIS COLOR, POC: YELLOW
UROBILINOGEN, POC: NORMAL
VALID INTERNAL CONTROL, POC: YES

## 2023-05-18 PROCEDURE — 81002 URINALYSIS NONAUTO W/O SCOPE: CPT | Performed by: PHYSICIAN ASSISTANT

## 2023-05-18 PROCEDURE — 81025 URINE PREGNANCY TEST: CPT | Performed by: PHYSICIAN ASSISTANT

## 2023-05-18 PROCEDURE — 99213 OFFICE O/P EST LOW 20 MIN: CPT | Performed by: PHYSICIAN ASSISTANT

## 2023-05-18 RX ORDER — BUPROPION HYDROCHLORIDE 150 MG/1
150 TABLET, EXTENDED RELEASE ORAL 2 TIMES DAILY
COMMUNITY
Start: 2023-03-28

## 2023-05-18 RX ORDER — PANTOPRAZOLE SODIUM 20 MG/1
1 TABLET, DELAYED RELEASE ORAL DAILY
COMMUNITY
Start: 2023-03-21

## 2023-05-18 RX ORDER — LAMOTRIGINE 200 MG/1
200 TABLET ORAL 2 TIMES DAILY
COMMUNITY
Start: 2023-03-28

## 2023-05-18 RX ORDER — IBUPROFEN 800 MG/1
800 TABLET ORAL EVERY 8 HOURS
Qty: 90 TABLET | Refills: 11 | COMMUNITY
Start: 2022-05-31 | End: 2023-05-31

## 2023-05-18 RX ORDER — TRAZODONE HYDROCHLORIDE 100 MG/1
2 TABLET ORAL
COMMUNITY
Start: 2023-05-01

## 2023-05-18 RX ORDER — LEVONORGESTREL AND ETHINYL ESTRADIOL 0.1-0.02MG
1 KIT ORAL DAILY
COMMUNITY
Start: 2022-09-09

## 2023-05-18 RX ORDER — ESCITALOPRAM OXALATE 10 MG/1
1 TABLET ORAL DAILY
COMMUNITY
Start: 2023-05-14

## 2023-05-18 RX ORDER — VITAMIN E (DL,TOCOPHERYL ACET) 180 MG
1 CAPSULE ORAL
COMMUNITY

## 2023-05-18 RX ORDER — OLANZAPINE 5 MG/1
5 TABLET, ORALLY DISINTEGRATING ORAL
COMMUNITY
Start: 2023-03-28

## 2023-05-18 RX ORDER — ESCITALOPRAM OXALATE 20 MG/1
20 TABLET ORAL DAILY
COMMUNITY
Start: 2020-05-08

## 2023-05-18 NOTE — PROGRESS NOTES
Chief Complaint   Patient presents with    Abdominal Pain     LRQ radiates toward back: X 1 month  HX: Ovarian Cyst     1. Have you been to the ER, urgent care clinic since your last visit? Hospitalized since your last visit? No    2. Have you seen or consulted any other health care providers outside of the [address] [place] [address] since your last visit? Include any pap smears or colon screening. Psyc.  Dr Jame Lara

## 2023-05-18 NOTE — PROGRESS NOTES
Chief Complaint   Patient presents with    Abdominal Pain     LRQ radiates toward back: X 1 month  HX: Ovarian Cyst     1. Have you been to the ER, urgent care clinic since your last visit? Hospitalized since your last visit? No    2. Have you seen or consulted any other health care providers outside of the [address] [place] [address] since your last visit? Include any pap smears or colon screening. Psyc. Dr Henri Barron  Chief Complaint   Patient presents with    Abdominal Pain     LRQ radiates toward back: X 1 month  HX: Ovarian Cyst     Gisella Cantu is a 23y.o. year old adult who presents for evaluation. Patient presents to the office today for evaluation of right lower abdomen pain. She reports the pain can radiate to her back. This has been going on now for approximately 1 month. She shares that she does have a history of ovarian cyst in the past.  Also the patient is concerned for possible pregnancy. She states last time she had a menstrual cycle was about a month ago. The patient reports that she has had some nausea at night and some breast tenderness as well. Reviewed and agree with Nurse Note and duplicated in this note. Reviewed PmHx, RxHx, FmHx, SocHx, AllgHx and updated and dated in the chart. Review of Systems - negative except as listed above    Objective:     Vitals:    05/18/23 0907   BP: 108/69   Pulse: 82   Resp: 16   Temp: 98.2 °F (36.8 °C)   TempSrc: Oral   SpO2: 92%   Weight: 180 lb (81.6 kg)   Height: 5' 7\" (1.702 m)     Physical Examination: General appearance - alert, well appearing, and in no distress  Mental status - normal mood, behavior, speech, dress, motor activity, and thought processes  Chest - clear to auscultation, no wheezes, rales or rhonchi, symmetric air entry  Abdomen - tenderness noted right lower quadrant, no rigidity, no guarding, active bowel sounds    Assessment/ Plan:   Irena was seen today for abdominal pain.     Diagnoses and all orders for this

## 2023-05-19 ENCOUNTER — TRANSCRIBE ORDERS (OUTPATIENT)
Facility: HOSPITAL | Age: 23
End: 2023-05-19

## 2023-05-19 DIAGNOSIS — R10.31 ABDOMINAL PAIN, RIGHT LOWER QUADRANT: Primary | ICD-10-CM

## 2023-05-19 DIAGNOSIS — Z87.42 PERSONAL HISTORY OF REPRODUCTIVE AND OBSTETRICAL PROBLEMS: ICD-10-CM

## 2023-05-26 RX ORDER — METHYLPREDNISOLONE 4 MG/1
TABLET ORAL
COMMUNITY
Start: 2023-04-06 | End: 2023-06-27 | Stop reason: ALTCHOICE

## 2023-05-26 RX ORDER — PANTOPRAZOLE SODIUM 20 MG/1
20 TABLET, DELAYED RELEASE ORAL DAILY
COMMUNITY
Start: 2023-01-19

## 2023-05-26 RX ORDER — TRAZODONE HYDROCHLORIDE 150 MG/1
150 TABLET ORAL
COMMUNITY
Start: 2023-03-28

## 2023-05-27 ENCOUNTER — HOSPITAL ENCOUNTER (OUTPATIENT)
Facility: HOSPITAL | Age: 23
Discharge: HOME | End: 2023-05-30
Payer: COMMERCIAL

## 2023-05-27 DIAGNOSIS — R10.31 RIGHT LOWER QUADRANT ABDOMINAL PAIN: ICD-10-CM

## 2023-05-27 DIAGNOSIS — Z87.42 HISTORY OF OVARIAN CYST: ICD-10-CM

## 2023-05-27 DIAGNOSIS — R10.31 ABDOMINAL PAIN, RIGHT LOWER QUADRANT: ICD-10-CM

## 2023-05-27 DIAGNOSIS — Z87.42 PERSONAL HISTORY OF REPRODUCTIVE AND OBSTETRICAL PROBLEMS: ICD-10-CM

## 2023-05-27 PROCEDURE — 76856 US EXAM PELVIC COMPLETE: CPT

## 2023-05-27 PROCEDURE — 76700 US EXAM ABDOM COMPLETE: CPT

## 2023-05-27 PROCEDURE — 76830 TRANSVAGINAL US NON-OB: CPT

## 2023-05-31 ENCOUNTER — TELEPHONE (OUTPATIENT)
Age: 23
End: 2023-05-31

## 2023-05-31 NOTE — TELEPHONE ENCOUNTER
Pt called in and is asking if you could please give her a call when you get a chance. States she wants to talk to you in  regards from her apt with you on 5.18.23. Thanks.

## 2023-06-01 ENCOUNTER — TELEPHONE (OUTPATIENT)
Age: 23
End: 2023-06-01

## 2023-06-01 DIAGNOSIS — R16.0 ENLARGED LIVER: Primary | ICD-10-CM

## 2023-06-01 NOTE — TELEPHONE ENCOUNTER
----- Message from Ivan Brown LPN sent at 8/46/2751  5:14 PM EDT -----  Called and spoke with patient. Pt id x 2. Relayed the previous message per Roxanne Carbajal PA-C. Pt verbalized understanding.

## 2023-06-27 ENCOUNTER — HOSPITAL ENCOUNTER (EMERGENCY)
Facility: HOSPITAL | Age: 23
Discharge: HOME | End: 2023-06-27
Attending: EMERGENCY MEDICINE
Payer: COMMERCIAL

## 2023-06-27 VITALS
TEMPERATURE: 98.6 F | HEIGHT: 67 IN | HEART RATE: 89 BPM | WEIGHT: 180 LBS | BODY MASS INDEX: 28.25 KG/M2 | OXYGEN SATURATION: 98 % | DIASTOLIC BLOOD PRESSURE: 85 MMHG | RESPIRATION RATE: 18 BRPM | SYSTOLIC BLOOD PRESSURE: 120 MMHG

## 2023-06-27 DIAGNOSIS — S39.012A STRAIN OF LUMBAR REGION, INITIAL ENCOUNTER: Primary | ICD-10-CM

## 2023-06-27 PROCEDURE — 6360000002 HC RX W HCPCS: Performed by: EMERGENCY MEDICINE

## 2023-06-27 PROCEDURE — 96372 THER/PROPH/DIAG INJ SC/IM: CPT

## 2023-06-27 PROCEDURE — 99284 EMERGENCY DEPT VISIT MOD MDM: CPT

## 2023-06-27 RX ORDER — DEXAMETHASONE SODIUM PHOSPHATE 10 MG/ML
8 INJECTION, SOLUTION INTRAMUSCULAR; INTRAVENOUS ONCE
Status: COMPLETED | OUTPATIENT
Start: 2023-06-27 | End: 2023-06-27

## 2023-06-27 RX ORDER — METHYLPREDNISOLONE 4 MG/1
TABLET ORAL
Qty: 21 TABLET | Refills: 0 | Status: SHIPPED | OUTPATIENT
Start: 2023-06-27 | End: 2023-07-03

## 2023-06-27 RX ORDER — KETOROLAC TROMETHAMINE 30 MG/ML
30 INJECTION, SOLUTION INTRAMUSCULAR; INTRAVENOUS
Status: COMPLETED | OUTPATIENT
Start: 2023-06-27 | End: 2023-06-27

## 2023-06-27 RX ORDER — METHOCARBAMOL 750 MG/1
750 TABLET, FILM COATED ORAL 4 TIMES DAILY
Qty: 40 TABLET | Refills: 0 | Status: SHIPPED | OUTPATIENT
Start: 2023-06-27 | End: 2023-07-07

## 2023-06-27 RX ADMIN — KETOROLAC TROMETHAMINE 30 MG: 30 INJECTION, SOLUTION INTRAMUSCULAR; INTRAVENOUS at 16:34

## 2023-06-27 RX ADMIN — DEXAMETHASONE SODIUM PHOSPHATE 8 MG: 10 INJECTION, SOLUTION INTRAMUSCULAR; INTRAVENOUS at 16:34

## 2023-08-05 ENCOUNTER — HOSPITAL ENCOUNTER (INPATIENT)
Facility: HOSPITAL | Age: 23
LOS: 2 days | Discharge: HOME | DRG: 885 | End: 2023-08-07
Attending: PSYCHIATRY & NEUROLOGY | Admitting: PSYCHIATRY & NEUROLOGY
Payer: COMMERCIAL

## 2023-08-05 DIAGNOSIS — R45.850 HOMICIDAL THOUGHTS: ICD-10-CM

## 2023-08-05 DIAGNOSIS — R45.89 SUICIDAL BEHAVIOR WITHOUT ATTEMPTED SELF-INJURY: Primary | ICD-10-CM

## 2023-08-05 PROBLEM — F25.9 SCHIZOAFFECTIVE DISORDER (HCC): Status: ACTIVE | Noted: 2023-08-05

## 2023-08-05 LAB
ALBUMIN SERPL-MCNC: 3.4 G/DL (ref 3.5–5)
ALBUMIN/GLOB SERPL: 1.3 (ref 1.1–2.2)
ALP SERPL-CCNC: 64 U/L (ref 45–117)
ALT SERPL-CCNC: 20 U/L (ref 12–78)
AMPHET UR QL SCN: NEGATIVE
ANION GAP SERPL CALC-SCNC: 7 MMOL/L (ref 5–15)
APPEARANCE UR: ABNORMAL
AST SERPL-CCNC: 17 U/L (ref 15–37)
BACTERIA URNS QL MICRO: ABNORMAL /HPF
BARBITURATES UR QL SCN: NEGATIVE
BASOPHILS # BLD: 0 K/UL (ref 0–0.1)
BASOPHILS NFR BLD: 0 % (ref 0–1)
BENZODIAZ UR QL: NEGATIVE
BILIRUB SERPL-MCNC: 0.2 MG/DL (ref 0.2–1)
BILIRUB UR QL: NEGATIVE
BUN SERPL-MCNC: 9 MG/DL (ref 6–20)
BUN/CREAT SERPL: 10 (ref 12–20)
CALCIUM SERPL-MCNC: 8.5 MG/DL (ref 8.5–10.1)
CANNABINOIDS UR QL SCN: POSITIVE
CHLORIDE SERPL-SCNC: 107 MMOL/L (ref 97–108)
CO2 SERPL-SCNC: 28 MMOL/L (ref 21–32)
COCAINE UR QL SCN: NEGATIVE
COLOR UR: ABNORMAL
CREAT SERPL-MCNC: 0.91 MG/DL (ref 0.55–1.02)
DIFFERENTIAL METHOD BLD: NORMAL
EOSINOPHIL # BLD: 0.1 K/UL (ref 0–0.4)
EOSINOPHIL NFR BLD: 1 % (ref 0–7)
EPITH CASTS URNS QL MICRO: ABNORMAL /LPF
ERYTHROCYTE [DISTWIDTH] IN BLOOD BY AUTOMATED COUNT: 12.6 % (ref 11.5–14.5)
ETHANOL SERPL-MCNC: <10 MG/DL (ref 0–0.08)
FLUAV RNA SPEC QL NAA+PROBE: NOT DETECTED
FLUBV RNA SPEC QL NAA+PROBE: NOT DETECTED
GLOBULIN SER CALC-MCNC: 2.7 G/DL (ref 2–4)
GLUCOSE SERPL-MCNC: 91 MG/DL (ref 65–100)
GLUCOSE UR STRIP.AUTO-MCNC: NEGATIVE MG/DL
HCG UR QL: NEGATIVE
HCT VFR BLD AUTO: 36 % (ref 35–47)
HGB BLD-MCNC: 11.5 G/DL (ref 11.5–16)
HGB UR QL STRIP: NEGATIVE
IMM GRANULOCYTES # BLD AUTO: 0 K/UL (ref 0–0.04)
IMM GRANULOCYTES NFR BLD AUTO: 0 % (ref 0–0.5)
KETONES UR QL STRIP.AUTO: NEGATIVE MG/DL
LEUKOCYTE ESTERASE UR QL STRIP.AUTO: ABNORMAL
LYMPHOCYTES # BLD: 1.2 K/UL (ref 0.8–3.5)
LYMPHOCYTES NFR BLD: 22 % (ref 12–49)
Lab: ABNORMAL
MCH RBC QN AUTO: 27.6 PG (ref 26–34)
MCHC RBC AUTO-ENTMCNC: 31.9 G/DL (ref 30–36.5)
MCV RBC AUTO: 86.5 FL (ref 80–99)
METHADONE UR QL: NEGATIVE
MONOCYTES # BLD: 0.5 K/UL (ref 0–1)
MONOCYTES NFR BLD: 10 % (ref 5–13)
NEUTS SEG # BLD: 3.6 K/UL (ref 1.8–8)
NEUTS SEG NFR BLD: 67 % (ref 32–75)
NITRITE UR QL STRIP.AUTO: NEGATIVE
NRBC # BLD: 0 K/UL (ref 0–0.01)
NRBC BLD-RTO: 0 PER 100 WBC
OPIATES UR QL: NEGATIVE
PCP UR QL: NEGATIVE
PH UR STRIP: 7.5 (ref 5–8)
PLATELET # BLD AUTO: 199 K/UL (ref 150–400)
PMV BLD AUTO: 11.4 FL (ref 8.9–12.9)
POTASSIUM SERPL-SCNC: 4.4 MMOL/L (ref 3.5–5.1)
PROT SERPL-MCNC: 6.1 G/DL (ref 6.4–8.2)
PROT UR STRIP-MCNC: NEGATIVE MG/DL
RBC # BLD AUTO: 4.16 M/UL (ref 3.8–5.2)
RBC #/AREA URNS HPF: ABNORMAL /HPF (ref 0–5)
SARS-COV-2 RNA RESP QL NAA+PROBE: NOT DETECTED
SODIUM SERPL-SCNC: 142 MMOL/L (ref 136–145)
SP GR UR REFRACTOMETRY: <1.005
URINE CULTURE IF INDICATED: ABNORMAL
UROBILINOGEN UR QL STRIP.AUTO: 0.2 EU/DL (ref 0.2–1)
WBC # BLD AUTO: 5.4 K/UL (ref 3.6–11)
WBC URNS QL MICRO: ABNORMAL /HPF (ref 0–4)

## 2023-08-05 PROCEDURE — 81025 URINE PREGNANCY TEST: CPT

## 2023-08-05 PROCEDURE — 99285 EMERGENCY DEPT VISIT HI MDM: CPT

## 2023-08-05 PROCEDURE — 81001 URINALYSIS AUTO W/SCOPE: CPT

## 2023-08-05 PROCEDURE — 87636 SARSCOV2 & INF A&B AMP PRB: CPT

## 2023-08-05 PROCEDURE — 82077 ASSAY SPEC XCP UR&BREATH IA: CPT

## 2023-08-05 PROCEDURE — 80053 COMPREHEN METABOLIC PANEL: CPT

## 2023-08-05 PROCEDURE — 90791 PSYCH DIAGNOSTIC EVALUATION: CPT

## 2023-08-05 PROCEDURE — 80307 DRUG TEST PRSMV CHEM ANLYZR: CPT

## 2023-08-05 PROCEDURE — 36415 COLL VENOUS BLD VENIPUNCTURE: CPT

## 2023-08-05 PROCEDURE — 6370000000 HC RX 637 (ALT 250 FOR IP): Performed by: PSYCHIATRY & NEUROLOGY

## 2023-08-05 PROCEDURE — 1240000000 HC EMOTIONAL WELLNESS R&B

## 2023-08-05 PROCEDURE — 85025 COMPLETE CBC W/AUTO DIFF WBC: CPT

## 2023-08-05 RX ORDER — DIPHENHYDRAMINE HYDROCHLORIDE 50 MG/ML
50 INJECTION INTRAMUSCULAR; INTRAVENOUS EVERY 4 HOURS PRN
Status: DISCONTINUED | OUTPATIENT
Start: 2023-08-05 | End: 2023-08-05

## 2023-08-05 RX ORDER — MAGNESIUM HYDROXIDE/ALUMINUM HYDROXICE/SIMETHICONE 120; 1200; 1200 MG/30ML; MG/30ML; MG/30ML
30 SUSPENSION ORAL EVERY 6 HOURS PRN
Status: DISCONTINUED | OUTPATIENT
Start: 2023-08-05 | End: 2023-08-07 | Stop reason: HOSPADM

## 2023-08-05 RX ORDER — MAGNESIUM HYDROXIDE/ALUMINUM HYDROXICE/SIMETHICONE 120; 1200; 1200 MG/30ML; MG/30ML; MG/30ML
30 SUSPENSION ORAL EVERY 6 HOURS PRN
Status: DISCONTINUED | OUTPATIENT
Start: 2023-08-05 | End: 2023-08-05

## 2023-08-05 RX ORDER — POLYETHYLENE GLYCOL 3350 17 G/17G
17 POWDER, FOR SOLUTION ORAL DAILY PRN
Status: DISCONTINUED | OUTPATIENT
Start: 2023-08-05 | End: 2023-08-05

## 2023-08-05 RX ORDER — HALOPERIDOL 5 MG/1
5 TABLET ORAL EVERY 4 HOURS PRN
Status: DISCONTINUED | OUTPATIENT
Start: 2023-08-05 | End: 2023-08-05

## 2023-08-05 RX ORDER — TRAZODONE HYDROCHLORIDE 50 MG/1
50 TABLET ORAL NIGHTLY PRN
Status: DISCONTINUED | OUTPATIENT
Start: 2023-08-05 | End: 2023-08-05

## 2023-08-05 RX ORDER — ACETAMINOPHEN 325 MG/1
650 TABLET ORAL EVERY 4 HOURS PRN
Status: DISCONTINUED | OUTPATIENT
Start: 2023-08-05 | End: 2023-08-05

## 2023-08-05 RX ORDER — HALOPERIDOL 5 MG/ML
5 INJECTION INTRAMUSCULAR EVERY 4 HOURS PRN
Status: DISCONTINUED | OUTPATIENT
Start: 2023-08-05 | End: 2023-08-07 | Stop reason: HOSPADM

## 2023-08-05 RX ORDER — HALOPERIDOL 5 MG/ML
5 INJECTION INTRAMUSCULAR EVERY 4 HOURS PRN
Status: DISCONTINUED | OUTPATIENT
Start: 2023-08-05 | End: 2023-08-05

## 2023-08-05 RX ORDER — POLYETHYLENE GLYCOL 3350 17 G/17G
17 POWDER, FOR SOLUTION ORAL DAILY PRN
Status: DISCONTINUED | OUTPATIENT
Start: 2023-08-05 | End: 2023-08-07 | Stop reason: HOSPADM

## 2023-08-05 RX ORDER — DIPHENHYDRAMINE HYDROCHLORIDE 50 MG/ML
50 INJECTION INTRAMUSCULAR; INTRAVENOUS EVERY 4 HOURS PRN
Status: DISCONTINUED | OUTPATIENT
Start: 2023-08-05 | End: 2023-08-07 | Stop reason: HOSPADM

## 2023-08-05 RX ORDER — HALOPERIDOL 5 MG/1
5 TABLET ORAL EVERY 4 HOURS PRN
Status: DISCONTINUED | OUTPATIENT
Start: 2023-08-05 | End: 2023-08-07 | Stop reason: HOSPADM

## 2023-08-05 RX ORDER — ACETAMINOPHEN 325 MG/1
650 TABLET ORAL EVERY 4 HOURS PRN
Status: DISCONTINUED | OUTPATIENT
Start: 2023-08-05 | End: 2023-08-07 | Stop reason: HOSPADM

## 2023-08-05 RX ORDER — HYDROXYZINE HYDROCHLORIDE 25 MG/1
50 TABLET, FILM COATED ORAL 3 TIMES DAILY PRN
Status: DISCONTINUED | OUTPATIENT
Start: 2023-08-05 | End: 2023-08-05

## 2023-08-05 RX ORDER — HYDROXYZINE HYDROCHLORIDE 25 MG/1
50 TABLET, FILM COATED ORAL 3 TIMES DAILY PRN
Status: DISCONTINUED | OUTPATIENT
Start: 2023-08-05 | End: 2023-08-07 | Stop reason: HOSPADM

## 2023-08-05 RX ORDER — TRAZODONE HYDROCHLORIDE 50 MG/1
50 TABLET ORAL NIGHTLY PRN
Status: DISCONTINUED | OUTPATIENT
Start: 2023-08-05 | End: 2023-08-07 | Stop reason: HOSPADM

## 2023-08-05 RX ORDER — OLANZAPINE 5 MG/1
1 TABLET ORAL DAILY
COMMUNITY
Start: 2023-07-29

## 2023-08-05 RX ADMIN — ACETAMINOPHEN 650 MG: 325 TABLET ORAL at 21:22

## 2023-08-05 RX ADMIN — TRAZODONE HYDROCHLORIDE 50 MG: 50 TABLET ORAL at 21:22

## 2023-08-05 ASSESSMENT — PAIN DESCRIPTION - LOCATION
LOCATION: BACK;KNEE
LOCATION: KNEE;ARM

## 2023-08-05 ASSESSMENT — PAIN - FUNCTIONAL ASSESSMENT
PAIN_FUNCTIONAL_ASSESSMENT: 0-10
PAIN_FUNCTIONAL_ASSESSMENT: ACTIVITIES ARE NOT PREVENTED

## 2023-08-05 ASSESSMENT — LIFESTYLE VARIABLES
HOW MANY STANDARD DRINKS CONTAINING ALCOHOL DO YOU HAVE ON A TYPICAL DAY: 1 OR 2
HOW OFTEN DO YOU HAVE A DRINK CONTAINING ALCOHOL: MONTHLY OR LESS

## 2023-08-05 ASSESSMENT — PAIN DESCRIPTION - DESCRIPTORS
DESCRIPTORS: SORE
DESCRIPTORS: SORE

## 2023-08-05 ASSESSMENT — SLEEP AND FATIGUE QUESTIONNAIRES
DO YOU USE A SLEEP AID: YES
DO YOU HAVE DIFFICULTY SLEEPING: YES
SLEEP PATTERN: DIFFICULTY FALLING ASLEEP
AVERAGE NUMBER OF SLEEP HOURS: -4

## 2023-08-05 ASSESSMENT — PAIN SCALES - GENERAL
PAINLEVEL_OUTOF10: 4
PAINLEVEL_OUTOF10: 5
PAINLEVEL_OUTOF10: 7

## 2023-08-05 ASSESSMENT — PAIN DESCRIPTION - ORIENTATION: ORIENTATION: RIGHT

## 2023-08-05 ASSESSMENT — PAIN DESCRIPTION - PAIN TYPE: TYPE: ACUTE PAIN;CHRONIC PAIN

## 2023-08-05 NOTE — ED NOTES
TRANSFER - OUT REPORT:    Verbal report given to [facility] [address] [facility]  being transferred to [facility] for routine progression of patient care       Report consisted of patient's Situation, Background, Assessment and   Recommendations(SBAR). Information from the following report(s) ED Encounter Summary, MAR, and Recent Results was reviewed with the receiving nurse. [place] Fall Assessment:    Presents to emergency department  because of falls (Syncope, seizure, or loss of consciousness): No  Age > 70: No  Altered Mental Status, Intoxication with alcohol or substance confusion (Disorientation, impaired judgment, poor safety awaremess, or inability to follow instructions): No  Impaired Mobility: Ambulates or transfers with assistive devices or assistance; Unable to ambulate or transer.: No             Lines:       Opportunity for questions and clarification was provided.       Patient transported with:  1800 Nichelle David RN  08/05/23 9214
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
## 2023-08-06 PROBLEM — F43.25 ADJUSTMENT DISORDER WITH MIXED DISTURBANCE OF EMOTIONS AND CONDUCT: Status: ACTIVE | Noted: 2023-08-06

## 2023-08-06 PROBLEM — F60.9 PERSONALITY DISORDER IN ADULT (HCC): Status: ACTIVE | Noted: 2023-08-06
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
## 2023-08-06 PROBLEM — R45.89 SUICIDAL BEHAVIOR WITHOUT ATTEMPTED SELF-INJURY: Status: ACTIVE | Noted: 2023-08-06

## 2023-08-06 PROBLEM — S81.851A DOG BITE OF RIGHT CALF: Status: RESOLVED | Noted: 2022-10-06 | Resolved: 2023-08-06

## 2023-08-06 PROBLEM — F39 UNSPECIFIED MOOD (AFFECTIVE) DISORDER (HCC): Status: RESOLVED | Noted: 2023-03-24 | Resolved: 2023-08-06

## 2023-08-06 PROCEDURE — 6370000000 HC RX 637 (ALT 250 FOR IP): Performed by: PSYCHIATRY & NEUROLOGY

## 2023-08-06 PROCEDURE — 1240000000 HC EMOTIONAL WELLNESS R&B

## 2023-08-06 PROCEDURE — 99223 1ST HOSP IP/OBS HIGH 75: CPT | Performed by: PSYCHIATRY & NEUROLOGY

## 2023-08-06 RX ORDER — LAMOTRIGINE 100 MG/1
200 TABLET ORAL 2 TIMES DAILY
Status: DISCONTINUED | OUTPATIENT
Start: 2023-08-06 | End: 2023-08-07 | Stop reason: HOSPADM

## 2023-08-06 RX ORDER — CYCLOBENZAPRINE HCL 10 MG
10 TABLET ORAL 2 TIMES DAILY PRN
Status: DISCONTINUED | OUTPATIENT
Start: 2023-08-06 | End: 2023-08-07 | Stop reason: HOSPADM

## 2023-08-06 RX ORDER — POLYETHYLENE GLYCOL 3350 17 G
2 POWDER IN PACKET (EA) ORAL
Status: DISCONTINUED | OUTPATIENT
Start: 2023-08-06 | End: 2023-08-07 | Stop reason: HOSPADM

## 2023-08-06 RX ORDER — OLANZAPINE 5 MG/1
5 TABLET ORAL EVERY EVENING
Status: DISCONTINUED | OUTPATIENT
Start: 2023-08-06 | End: 2023-08-07 | Stop reason: HOSPADM

## 2023-08-06 RX ORDER — ESCITALOPRAM OXALATE 10 MG/1
30 TABLET ORAL DAILY
Status: DISCONTINUED | OUTPATIENT
Start: 2023-08-07 | End: 2023-08-07 | Stop reason: HOSPADM

## 2023-08-06 RX ORDER — FERROUS SULFATE 325(65) MG
325 TABLET ORAL
Status: DISCONTINUED | OUTPATIENT
Start: 2023-08-07 | End: 2023-08-07 | Stop reason: HOSPADM

## 2023-08-06 RX ORDER — FERROUS SULFATE 325(65) MG
325 TABLET ORAL
COMMUNITY

## 2023-08-06 RX ORDER — BUPROPION HYDROCHLORIDE 150 MG/1
150 TABLET, EXTENDED RELEASE ORAL 2 TIMES DAILY
Status: DISCONTINUED | OUTPATIENT
Start: 2023-08-07 | End: 2023-08-07 | Stop reason: HOSPADM

## 2023-08-06 RX ORDER — METHOCARBAMOL 500 MG/1
750 TABLET, FILM COATED ORAL 3 TIMES DAILY
Status: DISCONTINUED | OUTPATIENT
Start: 2023-08-07 | End: 2023-08-07 | Stop reason: HOSPADM

## 2023-08-06 RX ORDER — PANTOPRAZOLE SODIUM 40 MG/1
40 TABLET, DELAYED RELEASE ORAL DAILY
Status: DISCONTINUED | OUTPATIENT
Start: 2023-08-06 | End: 2023-08-07

## 2023-08-06 RX ORDER — TRAZODONE HYDROCHLORIDE 100 MG/1
200 TABLET ORAL
Status: DISCONTINUED | OUTPATIENT
Start: 2023-08-06 | End: 2023-08-07 | Stop reason: HOSPADM

## 2023-08-06 RX ADMIN — NICOTINE POLACRILEX 2 MG: 2 LOZENGE ORAL at 15:31

## 2023-08-06 RX ADMIN — TRAZODONE HYDROCHLORIDE 200 MG: 100 TABLET ORAL at 21:35

## 2023-08-06 RX ADMIN — LAMOTRIGINE 200 MG: 100 TABLET ORAL at 21:36

## 2023-08-06 RX ADMIN — PANTOPRAZOLE SODIUM 40 MG: 40 TABLET, DELAYED RELEASE ORAL at 18:09

## 2023-08-06 RX ADMIN — OLANZAPINE 5 MG: 5 TABLET, FILM COATED ORAL at 18:09

## 2023-08-06 RX ADMIN — NICOTINE POLACRILEX 2 MG: 2 LOZENGE ORAL at 12:33

## 2023-08-06 ASSESSMENT — PAIN SCALES - GENERAL
PAINLEVEL_OUTOF10: 0
PAINLEVEL_OUTOF10: 0

## 2023-08-06 NOTE — PLAN OF CARE
1000 Patient in her room alert and oriented x 4,calm and cooperative with assessment and vital signs. Remains isolated. Feeling depressed and anxious, endorsed anxiety 5, depression 5,  denied any HI, SI or A/V hallucinations at this time. Will continue to monitor. Problem: Pain  Goal: Verbalizes/displays adequate comfort level or baseline comfort level  8/5/2023 2229 by Cherlyn Soulier, RN  Outcome: Not Progressing  8/5/2023 2228 by Cherlyn Soulier, RN  Outcome: Not Progressing     Problem: Depression  Goal: Will be euthymic at discharge  Description: INTERVENTIONS:  1. Administer medication as ordered  2. Provide emotional support via 1:1 interaction with staff  3. Encourage involvement in milieu/groups/activities  4. Monitor for social isolation  8/5/2023 2229 by Cherlyn Soulier, RN  Outcome: Not Progressing     Problem: Pain  Goal: Verbalizes/displays adequate comfort level or baseline comfort level  8/5/2023 2229 by Cherlyn Soulier, RN  Outcome: Not Progressing  8/5/2023 2228 by Cherlyn Soulier, RN  Outcome: Not Progressing     Problem: Depression  Goal: Will be euthymic at discharge  Description: INTERVENTIONS:  1. Administer medication as ordered  2. Provide emotional support via 1:1 interaction with staff  3. Encourage involvement in milieu/groups/activities  4.  Monitor for social isolation  8/5/2023 2229 by Cherlyn Soulier, RN  Outcome: Not Progressing

## 2023-08-06 NOTE — PROGRESS NOTES
[facility] Medication Reconciliation    Information obtained from:  Patient interview, Rx Query  RxQuery data available1: yes    Comments/recommendations:    1) The patient was interviewed regarding current PTA medication list, use and drug allergies. \"Other\" allergy refers to turkey which causes throat itching. 2) Medication changes to PTA list:    Added  OTC iron supplement daily  Removed  Lessina (levonorgestrel-ethinyl estradiol)  Ondansetron ODT 4 mg  Adjusted  Bupropion  mg BID - patient prefers to take at  [address] and 1PM    3) The [address] () was accessed to determine fill history of any controlled medications:  No record of controlled medications dispensed in past 2 years       1RxQuery pharmacy benefit data reflects medications filled and processed through the patient's insurance, however                this data does NOT capture whether the medication was picked up or is currently being taken by the patient. Past Medical History/Disease States:  Past Medical History:   Diagnosis Date    ADHD     Anxiety     Anxiety     Borderline personality disorder ([address])     Depression     Headache     Multiple personality disorder (HCC)     PTSD (post-traumatic stress disorder)     Reactive attachment disorder          Patient allergies: Allergies as of 08/05/2023 - Fully Reviewed 08/05/2023   Allergen Reaction Noted    Other Itching 08/05/2023    Adhesive tape Other (See Comments) 09/09/2022    Nsaids Other (See Comments) 03/24/2023         Prior to Admission Medications   Prescriptions Last Dose Informant Patient Reported? Taking?    OLANZapine (ZYPREXA) 5 MG tablet 8/4/2023  Yes Yes   Sig: Take 1 tablet by mouth daily   Probiotic Product (PROBIOTIC ACIDOPHILUS) CHEW Past Week  Yes Yes   Sig: Take 1 tablet by mouth   acetaminophen (TYLENOL) 500 MG tablet Past Week  Yes Yes   Sig: Take 2 tablets by mouth 3 times daily   Patient taking differently: Take 2

## 2023-08-06 NOTE — PLAN OF CARE
Problem: Pain  Goal: Verbalizes/displays adequate comfort level or baseline comfort level  8/5/2023 2229 by Lamont Hough RN  Outcome: Not Progressing  8/5/2023 2228 by Lamont Hough RN  Outcome: Not Progressing     Problem: Depression  Goal: Will be euthymic at discharge  Description: INTERVENTIONS:  1. Administer medication as ordered  2. Provide emotional support via 1:1 interaction with staff  3. Encourage involvement in milieu/groups/activities  4. Monitor for social isolation  Outcome: Not Progressing     Problem: Self Harm/Suicidality  Goal: Will have no self-injury during hospital stay  Description: INTERVENTIONS:  1. Ensure constant observer at bedside with Q15M safety checks  2. Maintain a safe environment  3. Secure patient belongings  4. Ensure family/visitors adhere to safety recommendations  5. Ensure safety tray has been added to patient's diet order  6. Every shift and PRN: Re-assess suicidal risk via Frequent Screener    Outcome: Progressing     Problem: Pain  Goal: Verbalizes/displays adequate comfort level or baseline comfort level  8/5/2023 2229 by Lamont Hough RN  Outcome: Not Progressing  8/5/2023 2228 by Lamont Hough RN  Outcome: Not Progressing     Problem: Depression  Goal: Will be euthymic at discharge  Description: INTERVENTIONS:  1. Administer medication as ordered  2. Provide emotional support via 1:1 interaction with staff  3. Encourage involvement in milieu/groups/activities  4.  Monitor for social isolation  Outcome: Not Progressing

## 2023-08-07 VITALS
TEMPERATURE: 97.6 F | HEIGHT: 67 IN | RESPIRATION RATE: 16 BRPM | BODY MASS INDEX: 29.98 KG/M2 | OXYGEN SATURATION: 99 % | SYSTOLIC BLOOD PRESSURE: 101 MMHG | DIASTOLIC BLOOD PRESSURE: 58 MMHG | HEART RATE: 67 BPM | WEIGHT: 191 LBS

## 2023-08-07 PROCEDURE — 99239 HOSP IP/OBS DSCHRG MGMT >30: CPT | Performed by: PSYCHIATRY & NEUROLOGY

## 2023-08-07 PROCEDURE — 6370000000 HC RX 637 (ALT 250 FOR IP): Performed by: PSYCHIATRY & NEUROLOGY

## 2023-08-07 RX ORDER — PANTOPRAZOLE SODIUM 40 MG/1
40 TABLET, DELAYED RELEASE ORAL
Status: DISCONTINUED | OUTPATIENT
Start: 2023-08-07 | End: 2023-08-07 | Stop reason: HOSPADM

## 2023-08-07 RX ADMIN — ESCITALOPRAM OXALATE 30 MG: 10 TABLET ORAL at 08:12

## 2023-08-07 RX ADMIN — FERROUS SULFATE TAB 325 MG (65 MG ELEMENTAL FE) 325 MG: 325 (65 FE) TAB at 08:12

## 2023-08-07 RX ADMIN — LAMOTRIGINE 200 MG: 100 TABLET ORAL at 09:16

## 2023-08-07 RX ADMIN — METHOCARBAMOL 750 MG: 500 TABLET ORAL at 09:16

## 2023-08-07 RX ADMIN — METHOCARBAMOL 750 MG: 500 TABLET ORAL at 13:17

## 2023-08-07 RX ADMIN — NICOTINE POLACRILEX 2 MG: 2 LOZENGE ORAL at 09:15

## 2023-08-07 RX ADMIN — BUPROPION HYDROCHLORIDE 150 MG: 150 TABLET, EXTENDED RELEASE ORAL at 13:17

## 2023-08-07 RX ADMIN — BUPROPION HYDROCHLORIDE 150 MG: 150 TABLET, EXTENDED RELEASE ORAL at 08:12

## 2023-08-07 NOTE — PLAN OF CARE
Problem: Safety - Adult  Goal: Free from fall injury  8/6/2023 2227 by Herman Wilder RN  Outcome: Progressing     Problem: Self Harm/Suicidality  Goal: Will have no self-injury during hospital stay  Description: INTERVENTIONS:  1. Ensure constant observer at bedside with Q15M safety checks  2. Maintain a safe environment  3. Secure patient belongings  4. Ensure family/visitors adhere to safety recommendations  5. Ensure safety tray has been added to patient's diet order  6.   Every shift and PRN: Re-assess suicidal risk via Frequent Screener    8/7/2023 1159 by Mary Samuels RN  Outcome: Progressing  8/6/2023 2227 by Herman Wilder RN  Outcome: Progressing  Flowsheets (Taken 8/6/2023 1042 by Lorena Edwards RN)  Will have no self-injury during hospital stay:   Maintain a safe environment   Ensure constant observer at bedside with Q15M safety checks

## 2023-08-07 NOTE — DISCHARGE INSTRUCTIONS
DISCHARGE SUMMARY    NAME:Irena Jett  : 2000  MRN: [id number]    The patient Concepción Valle exhibits the ability to control behavior in a less restrictive environment. Patient's level of functioning is improving. No assaultive/destructive behavior has been observed for the past 24 hours. No suicidal/homicidal threat or behavior has been observed for the past 24 hours. There is no evidence of serious medication side effects. Patient has not been in physical or protective restraints for at least the past 24 hours. If weapons involved, how are they secured? None    Is patient aware of and in agreement with discharge plan? Yes    Arrangements for medication:  Prescriptions sent    Copy of discharge instructions to provider?:  Yes    Arrangements for transportation home:  Lyft     Keep all follow up appointments as scheduled, continue to take prescribed medications per physician instructions.   Mental health crisis number:  886 or your local mental health crisis line number at [address] [facility] WARM LINE      0-911-563-MHAV ([phone])      M-F: 9am to 9pm      Sat & Sun: 5pm - 9pm  National suicide prevention lines:                             6-764-YODHKSS ([phone])       8-622-095-TALK ([phone])    Crisis Text Line:  Text HOME to 670653

## 2023-08-07 NOTE — GROUP NOTE
Group Therapy Note    Date: 8/7/2023    Group Start Time: 1500  Group End Time: 1600  Group Topic: Recreational    [address] 3 ACUTE BEHAV HLTH    [address]        Group Therapy Note    Attendees:        Patient's Goal:  To concentrate on selected task       Status After Intervention:  Improved    Participation Level: Interactive    Participation Quality: Attentive and Sharing      Speech:  normal      Thought Process/Content: Logical      Affective Functioning: Congruent      Mood: euthymic      Level of consciousness:  Attentive      Response to Learning: Progressing to goal      Endings: None Reported    Modes of Intervention: Socialization and Activity      Discipline Responsible: Recreational Therapist      Signature:  Tarsha Linton

## 2023-08-07 NOTE — PLAN OF CARE
Problem: Pain  Goal: Verbalizes/displays adequate comfort level or baseline comfort level  Outcome: Progressing     Problem: Pain  Goal: Verbalizes/displays adequate comfort level or baseline comfort level  Outcome: Progressing     Problem: Safety - Adult  Goal: Free from fall injury  Outcome: Progressing     Problem: Self Harm/Suicidality  Goal: Will have no self-injury during hospital stay  Description: INTERVENTIONS:  1. Ensure constant observer at bedside with Q15M safety checks  2. Maintain a safe environment  3. Secure patient belongings  4. Ensure family/visitors adhere to safety recommendations  5. Ensure safety tray has been added to patient's diet order  6. Every shift and PRN: Re-assess suicidal risk via Frequent Screener    8/6/2023 2227 by Ignacia Malik RN  Outcome: Progressing  [address] (Taken 8/6/2023 1042 by Oskar Castrejon, RACHELLE)  Will have no self-injury during hospital stay:   Maintain a safe environment   Ensure constant observer at bedside with Q15M safety checks  8/6/2023 1038 by Oskar Castrejon RN  Outcome: Progressing     Problem: Depression  Goal: Will be euthymic at discharge  Description: INTERVENTIONS:  1. Administer medication as ordered  2. Provide emotional support via 1:1 interaction with staff  3. Encourage involvement in milieu/groups/activities  4. Monitor for social isolation  Outcome: Progressing     Problem: Anxiety  Goal: Will report anxiety at manageable levels  Description: INTERVENTIONS:  1. Administer medication as ordered  2. Teach and rehearse alternative coping skills  3.  Provide emotional support with 1:1 interaction with staff  Outcome: Edgar Bray (Taken 8/6/2023 1042 by Oskar Castrejon, RN)  Will report anxiety at manageable levels: Administer medication as ordered

## 2023-08-07 NOTE — GROUP NOTE
Group Therapy Note    Date: 8/7/2023    Group Start Time: 1115  Group End Time: 7515  Group Topic: Topic Group    [facility] 3 ACUTE BEHAV HLTH    Leslie Brasher        Group Therapy Note    Attendees:        Patient's Goal:  To participate in relaxation activity        Status After Intervention:  Improved    Participation Level: Interactive    Participation Quality: Attentive and Sharing      Speech:  normal      Thought Process/Content: Logical      Affective Functioning: Congruent      Mood: euthymic      Level of consciousness:  Attentive      Response to Learning: Progressing to goal      Endings: None Reported    Modes of Intervention: Activity      Discipline Responsible: Recreational Therapist      Signature:  Ligia Bejarano
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
## 2023-08-07 NOTE — CARE COORDINATION
08/07/23 1559   ITP   Date of Plan 08/07/23   Primary Diagnosis Code Schizoaffective disorder   Barriers to Treatment Need for psychoeducation   Plan of Care   Long Term Goal (LTG) Stated in patient/guardian terms Return to partner's house   Short Term Goal 1   Short Term Goal 1 Client will learn and demonstrate effective coping skills   Baseline Functioning Client does not have effective coping skills   Target Client will discharge with effective coping skills   Objectives Client will participate in group therapy   Intervention 1 Acknowledge client strengths;Milieu therapy and support;Group therapy   Frequency Daily   Measured by Staff observation;Self report   Staff Responsible Clinical staff;[facility] staff   STG Goal 1 Status: Patient Appears to be  Progressing toward treatment plan goal   Short Term Goal 2   Short Term Goal 2 Client will learn and demonstrate anxiety management skills   Baseline Functioning Client does not have effective anxiety management skills   Target Client will discharge with effective anxiety management skills   Objectives Client will participate in group therapy;Client will participate in individual therapy   Intervention 1 Acknowledge client strengths;Group therapy;Milieu therapy and support   Measured by Staff observation;Self report   Staff Responsible Clinical staff;[facility] staff   STG Goal 2 Status: Patient Appears to be  Progressing toward treatment plan goal   Crisis/Safety/Discharge Plan   Crisis/Safety Plan Standard program interventions and protocol   Comprehensive Assessment Completion Date 08/07/23   Discharge Plan Home to partner     JERRY Choi
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
## 2023-08-08 NOTE — DISCHARGE SUMMARY
feels their's is the most significant as they has periods of psychosis, included AH to harm themself. Patient is cooperative and pleasant during assessment. They request resumption of a muscle relaxing agent due to chronic pain. The patient states that ultimately they would prefer going to their fiance's home upon discharge than to return to their mother's home or make the move to [place]. The patient is a good historian. The patient corroborates the above narrative. The patient contracts for safety on the unit and gives consent for the team to contact collateral. The patient is amenable to initiating treatment while on the unit. Discussed PHP referral which the patient states they have previously completed without any improvement. 08/07 - no acute overnight events. The patient is visible, calm and cooperative and medication compliant. They are endorsing AH which are pleasant and appear in no acute distress. Patient got no PRNs for agitation and aggression, and is in fair behavioral control. Patient hoping to go to their fiance's residence on discharge. They deny active thoughts of self harm, SI/HI/PI. HOSPITALIZATION COURSE:    Melissa Castano was admitted to the inpatient psychiatric unit [facility] for acute psychiatric stabilization in regards to symptomatology as described in the HPI above. The differential diagnosis at time of admission included: MDD vs adjustment disorder. While on the unit Melissa Castano was involved in individual, group, occupational and milieu therapy. Psychiatric medications were not adjusted during this hospitalization. Melissa Castano demonstrated a slow, but progressive improvement in overall condition. Much of patient's initial presentation appeared to be related to situational stressors and psychological factors. Please see individual progress notes for more specific details regarding patient's hospitalization course.      Patient with request

## 2023-08-12 ENCOUNTER — APPOINTMENT (OUTPATIENT)
Facility: HOSPITAL | Age: 23
End: 2023-08-12
Payer: COMMERCIAL

## 2023-08-12 ENCOUNTER — HOSPITAL ENCOUNTER (EMERGENCY)
Facility: HOSPITAL | Age: 23
Discharge: HOME | End: 2023-08-12
Attending: EMERGENCY MEDICINE
Payer: COMMERCIAL

## 2023-08-12 VITALS
HEIGHT: 67 IN | DIASTOLIC BLOOD PRESSURE: 68 MMHG | WEIGHT: 190 LBS | TEMPERATURE: 98.2 F | OXYGEN SATURATION: 99 % | RESPIRATION RATE: 16 BRPM | SYSTOLIC BLOOD PRESSURE: 100 MMHG | HEART RATE: 80 BPM | BODY MASS INDEX: 29.82 KG/M2

## 2023-08-12 DIAGNOSIS — M25.562 ACUTE PAIN OF LEFT KNEE: Primary | ICD-10-CM

## 2023-08-12 PROCEDURE — 73562 X-RAY EXAM OF KNEE 3: CPT

## 2023-08-12 PROCEDURE — 99283 EMERGENCY DEPT VISIT LOW MDM: CPT

## 2023-08-12 PROCEDURE — 6370000000 HC RX 637 (ALT 250 FOR IP)

## 2023-08-12 RX ORDER — ACETAMINOPHEN 500 MG
1000 TABLET ORAL
Status: COMPLETED | OUTPATIENT
Start: 2023-08-12 | End: 2023-08-12

## 2023-08-12 RX ADMIN — ACETAMINOPHEN 1000 MG: 500 TABLET, FILM COATED ORAL at 17:44

## 2023-08-12 ASSESSMENT — PAIN DESCRIPTION - LOCATION
LOCATION: KNEE
LOCATION: LEG;KNEE

## 2023-08-12 ASSESSMENT — PAIN DESCRIPTION - ORIENTATION
ORIENTATION: LEFT
ORIENTATION: LEFT

## 2023-08-12 ASSESSMENT — PAIN - FUNCTIONAL ASSESSMENT: PAIN_FUNCTIONAL_ASSESSMENT: 0-10

## 2023-08-12 ASSESSMENT — PAIN SCALES - GENERAL
PAINLEVEL_OUTOF10: 6
PAINLEVEL_OUTOF10: 6

## 2023-08-12 ASSESSMENT — PAIN DESCRIPTION - DESCRIPTORS
DESCRIPTORS: ACHING;SHARP
DESCRIPTORS: ACHING

## 2023-08-12 NOTE — DISCHARGE INSTRUCTIONS
Discussed visit today. Please follow-up with orthopedics by calling and scheduling appointment. Discussed that you might possibly need further evaluation with additional imaging. In the meantime take Tylenol and ibuprofen at home for the pain. RICE instructions given -rest, ice, compression with brace and elevation. Return to the ER with any worsening of symptoms.

## 2023-08-12 NOTE — ED TRIAGE NOTES
Pt ambulatory into ER with slow, steady gait with cc of LLE knee pain that began today when stepping in a pothole and \"twisting knee after falling onto grass. \"    Pt denies OTC medication yet.

## 2023-08-12 NOTE — ED NOTES
No IV to be removed. Pt given dc paperwork. Pt tolerated dc well. Pt stated understanding of teaching and denied questions. Pt left ER with all belongings.      Maribel Kwok RN  08/12/23 1931

## 2023-08-12 NOTE — ED NOTES
Applied knee immobilizer to patient's knee. Pt given crutches. Pt states she's used both in the past for previous injuries. States she doesn't need further education. Pt demonstrated proper use of DME.      Ronnie Gu RN  08/12/23 1931

## 2023-09-03 ENCOUNTER — HOSPITAL ENCOUNTER (EMERGENCY)
Facility: HOSPITAL | Age: 23
Discharge: HOME | End: 2023-09-03
Attending: STUDENT IN AN ORGANIZED HEALTH CARE EDUCATION/TRAINING PROGRAM
Payer: COMMERCIAL

## 2023-09-03 VITALS
OXYGEN SATURATION: 99 % | DIASTOLIC BLOOD PRESSURE: 62 MMHG | RESPIRATION RATE: 18 BRPM | BODY MASS INDEX: 27.28 KG/M2 | SYSTOLIC BLOOD PRESSURE: 103 MMHG | HEART RATE: 103 BPM | WEIGHT: 180 LBS | HEIGHT: 68 IN | TEMPERATURE: 98.3 F

## 2023-09-03 DIAGNOSIS — R11.2 NAUSEA AND VOMITING, UNSPECIFIED VOMITING TYPE: Primary | ICD-10-CM

## 2023-09-03 DIAGNOSIS — R10.9 ABDOMINAL PAIN, UNSPECIFIED ABDOMINAL LOCATION: ICD-10-CM

## 2023-09-03 LAB
ALBUMIN SERPL-MCNC: 4 G/DL (ref 3.5–5.2)
ALBUMIN/GLOB SERPL: 1.8 (ref 1.1–2.2)
ALP SERPL-CCNC: 64 U/L (ref 35–104)
ALT SERPL-CCNC: 32 U/L (ref 10–35)
ANION GAP SERPL CALC-SCNC: 11 MMOL/L (ref 5–15)
APPEARANCE UR: CLEAR
AST SERPL-CCNC: 44 U/L (ref 10–35)
BACTERIA URNS QL MICRO: ABNORMAL /HPF
BASOPHILS # BLD: 0 K/UL (ref 0–1)
BASOPHILS NFR BLD: 1 % (ref 0–1)
BILIRUB SERPL-MCNC: 0.2 MG/DL (ref 0.2–1)
BILIRUB UR QL: NEGATIVE
BUN SERPL-MCNC: 6 MG/DL (ref 6–20)
BUN/CREAT SERPL: 9 (ref 12–20)
CALCIUM SERPL-MCNC: 8.9 MG/DL (ref 8.6–10)
CHLORIDE SERPL-SCNC: 107 MMOL/L (ref 98–107)
CO2 SERPL-SCNC: 22 MMOL/L (ref 22–29)
COLOR UR: ABNORMAL
COMMENT:: NORMAL
CREAT SERPL-MCNC: 0.69 MG/DL (ref 0.5–0.9)
DIFFERENTIAL METHOD BLD: ABNORMAL
EOSINOPHIL # BLD: 0.1 K/UL (ref 0–0.4)
EOSINOPHIL NFR BLD: 2 %
EPITH CASTS URNS QL MICRO: ABNORMAL /LPF
ERYTHROCYTE [DISTWIDTH] IN BLOOD BY AUTOMATED COUNT: 13.1 % (ref 11.5–14.5)
ETHANOL SERPL-MCNC: <10 MG/DL (ref 0–10)
GLOBULIN SER CALC-MCNC: 2.2 G/DL (ref 2–4)
GLUCOSE SERPL-MCNC: 107 MG/DL (ref 65–100)
GLUCOSE UR STRIP.AUTO-MCNC: NEGATIVE MG/DL
HCG SERPL-ACNC: <1 MIU/ML
HCT VFR BLD AUTO: 40.6 % (ref 35–47)
HGB BLD-MCNC: 13.3 G/DL (ref 11.5–16)
HGB UR QL STRIP: ABNORMAL
IMM GRANULOCYTES # BLD AUTO: 0 K/UL (ref 0–0.04)
IMM GRANULOCYTES NFR BLD AUTO: 0 % (ref 0–0.5)
KETONES UR QL STRIP.AUTO: NEGATIVE MG/DL
LEUKOCYTE ESTERASE UR QL STRIP.AUTO: ABNORMAL
LIPASE SERPL-CCNC: 25 U/L (ref 13–60)
LYMPHOCYTES # BLD: 1.4 K/UL (ref 0.8–3.5)
LYMPHOCYTES NFR BLD: 19 % (ref 12–49)
MCH RBC QN AUTO: 28.5 PG (ref 26–34)
MCHC RBC AUTO-ENTMCNC: 32.8 G/DL (ref 30–36.5)
MCV RBC AUTO: 86.9 FL (ref 80–99)
MONOCYTES # BLD: 0.6 K/UL (ref 0–1)
MONOCYTES NFR BLD: 8 % (ref 5–13)
NEUTS SEG # BLD: 5.2 K/UL (ref 1.8–8)
NEUTS SEG NFR BLD: 70 % (ref 32–75)
NITRITE UR QL STRIP.AUTO: NEGATIVE
NRBC # BLD: 0 K/UL (ref 0–0.01)
NRBC BLD-RTO: 0 PER 100 WBC
PH UR STRIP: 7 (ref 5–8)
PLATELET # BLD AUTO: 268 K/UL (ref 150–400)
PMV BLD AUTO: 11.6 FL (ref 8.9–12.9)
POTASSIUM SERPL-SCNC: 4.3 MMOL/L (ref 3.5–5.1)
PROT SERPL-MCNC: 6.2 G/DL (ref 6.4–8.3)
PROT UR STRIP-MCNC: NEGATIVE MG/DL
RBC # BLD AUTO: 4.67 M/UL (ref 3.8–5.2)
RBC #/AREA URNS HPF: ABNORMAL /HPF
SODIUM SERPL-SCNC: 140 MMOL/L (ref 136–145)
SP GR UR REFRACTOMETRY: 1.01 (ref 1–1.03)
SPECIMEN HOLD: NORMAL
URINE CULTURE IF INDICATED: ABNORMAL
UROBILINOGEN UR QL STRIP.AUTO: 0.2 EU/DL (ref 0.2–1)
WBC # BLD AUTO: 7.4 K/UL (ref 3.6–11)
WBC URNS QL MICRO: ABNORMAL /HPF (ref 0–4)

## 2023-09-03 PROCEDURE — 96375 TX/PRO/DX INJ NEW DRUG ADDON: CPT

## 2023-09-03 PROCEDURE — 85025 COMPLETE CBC W/AUTO DIFF WBC: CPT

## 2023-09-03 PROCEDURE — 84702 CHORIONIC GONADOTROPIN TEST: CPT

## 2023-09-03 PROCEDURE — 96374 THER/PROPH/DIAG INJ IV PUSH: CPT

## 2023-09-03 PROCEDURE — 81001 URINALYSIS AUTO W/SCOPE: CPT

## 2023-09-03 PROCEDURE — 6360000002 HC RX W HCPCS: Performed by: NURSE PRACTITIONER

## 2023-09-03 PROCEDURE — 80053 COMPREHEN METABOLIC PANEL: CPT

## 2023-09-03 PROCEDURE — 99284 EMERGENCY DEPT VISIT MOD MDM: CPT

## 2023-09-03 PROCEDURE — 2580000003 HC RX 258: Performed by: NURSE PRACTITIONER

## 2023-09-03 PROCEDURE — 83690 ASSAY OF LIPASE: CPT

## 2023-09-03 PROCEDURE — 82077 ASSAY SPEC XCP UR&BREATH IA: CPT

## 2023-09-03 PROCEDURE — 36415 COLL VENOUS BLD VENIPUNCTURE: CPT

## 2023-09-03 RX ORDER — ONDANSETRON 4 MG/1
4 TABLET, ORALLY DISINTEGRATING ORAL 3 TIMES DAILY PRN
Qty: 21 TABLET | Refills: 0 | Status: SHIPPED | OUTPATIENT
Start: 2023-09-03

## 2023-09-03 RX ORDER — DICYCLOMINE HYDROCHLORIDE 10 MG/1
10 CAPSULE ORAL 4 TIMES DAILY PRN
Qty: 30 CAPSULE | Refills: 0 | Status: SHIPPED | OUTPATIENT
Start: 2023-09-03

## 2023-09-03 RX ORDER — ONDANSETRON 2 MG/ML
4 INJECTION INTRAMUSCULAR; INTRAVENOUS EVERY 6 HOURS PRN
Status: DISCONTINUED | OUTPATIENT
Start: 2023-09-03 | End: 2023-09-04 | Stop reason: HOSPADM

## 2023-09-03 RX ORDER — 0.9 % SODIUM CHLORIDE 0.9 %
1000 INTRAVENOUS SOLUTION INTRAVENOUS ONCE
Status: COMPLETED | OUTPATIENT
Start: 2023-09-03 | End: 2023-09-03

## 2023-09-03 RX ORDER — FAMOTIDINE 20 MG/1
20 TABLET, FILM COATED ORAL 2 TIMES DAILY
Qty: 60 TABLET | Refills: 0 | Status: SHIPPED | OUTPATIENT
Start: 2023-09-03

## 2023-09-03 RX ORDER — KETOROLAC TROMETHAMINE 30 MG/ML
30 INJECTION, SOLUTION INTRAMUSCULAR; INTRAVENOUS
Status: COMPLETED | OUTPATIENT
Start: 2023-09-03 | End: 2023-09-03

## 2023-09-03 RX ADMIN — KETOROLAC TROMETHAMINE 30 MG: 30 INJECTION, SOLUTION INTRAMUSCULAR; INTRAVENOUS at 23:25

## 2023-09-03 RX ADMIN — ONDANSETRON 4 MG: 2 INJECTION INTRAMUSCULAR; INTRAVENOUS at 22:15

## 2023-09-03 RX ADMIN — SODIUM CHLORIDE 1000 ML: 9 INJECTION, SOLUTION INTRAVENOUS at 22:16

## 2023-09-03 ASSESSMENT — PAIN DESCRIPTION - LOCATION
LOCATION: ABDOMEN
LOCATION: BACK

## 2023-09-03 ASSESSMENT — PAIN DESCRIPTION - ORIENTATION: ORIENTATION: LOWER;LEFT

## 2023-09-03 ASSESSMENT — PAIN SCALES - GENERAL
PAINLEVEL_OUTOF10: 4
PAINLEVEL_OUTOF10: 5

## 2023-09-03 ASSESSMENT — PAIN DESCRIPTION - PAIN TYPE: TYPE: ACUTE PAIN

## 2023-09-03 ASSESSMENT — PAIN - FUNCTIONAL ASSESSMENT: PAIN_FUNCTIONAL_ASSESSMENT: 0-10

## 2023-09-04 ASSESSMENT — ENCOUNTER SYMPTOMS
NAUSEA: 1
BLOOD IN STOOL: 0
DIARRHEA: 0
RHINORRHEA: 0
SHORTNESS OF BREATH: 0
ABDOMINAL PAIN: 1
VOMITING: 1

## 2023-09-04 NOTE — ED TRIAGE NOTES
Patient to ED with c/o nausea with vomiting x 1 week. Last emesis 1pm. History of gastritis & ovarian cysts. LMP 8/29/23. Denies diarrhea.

## 2023-09-04 NOTE — ED PROVIDER NOTES
[facility] & [facility] EMERGENCY DEPT  EMERGENCY DEPARTMENT ENCOUNTER      Pt Name: Guillermina Palm  MRN: [id number]  [address] 2000  Date of evaluation: 9/3/2023  Provider: TERESITA Francis NP    [address]       Chief Complaint   Patient presents with    Nausea    Emesis         HISTORY OF PRESENT ILLNESS   (Location/Symptom, Timing/Onset, Context/Setting, Quality, Duration, Modifying Factors, Severity)  Note limiting factors. The history is provided by the patient. No  was used. Guillermina Palm is a 26 y.o. adult with Hx of alcoholic steatosis, ovarian cyst, depression, anxiety, borderline and multiple personality disorder, chronic headaches and pain, PTSD,   who presents ambulatory w/ friend to [place] ED with cc of abdominal pain. Patient reports \"extreme nausea\" with associative vomiting that started this morning with associated left lower quadrant abdominal pain. Patient reports that she has actually had \"extreme nausea\" ever since she moved in with her significant other's family 2 weeks ago. Apparently they were ill with a GI illness at that time. Reports \"greenish\" stool with increased frequency of BM but not diarrhea. No blood in stool. Concerned about possible pregnancy, LMP was last week, home test (-) PTA. No urinary or vaginal concerns, fevers, chills. States history of abdominal pain with prior history of ovarian cyst and gastritis. Denies any fevers, chills, urinary concerns, atypical vaginal bleeding, cough, congestion or any other medical concerns at this time. Reports tobacco and frequent alcohol use, but not daily. Denies any substance abuse. PCP: TERESITA Hussein NP    There are no other complaints, changes or physical findings at this time. Review of External Medical Records:     Nursing Notes were reviewed.     REVIEW OF SYSTEMS    (2-9 systems for level 4, 10 or more for level 5)     Review of Systems   Constitutional:  Positive for

## 2023-09-04 NOTE — DISCHARGE INSTRUCTIONS
Today, you were seen in the ER for abdominal pain. Your bloodwork  was reassuring, but did not show any explanation for your symptoms. Take medications as directed. We recommend a clear liquid diet for the next 24-48 hours to help with bowel rest and eat bland foods such as bananas, rice, apples, toast. Avoid coffee, alcohol, marijuana, or any acidic foods (salsa, pizza, hot sauce, etc.). How you feel can change, and you should call 911 or return to the ER if you experience:  -vomiting that prevents you from keeping down fluids or medications  -worsening of your pain  -fever of 100.4 or higher  -blood in your stool or vomit  -any other new or concerning symptoms    Otherwise, please see your primary doctor in 1-2 days for re-evaluation. Call for an appointment today or tomorrow. If you have chronic abdominal pain (greater than 1-2 months), it may be beneficial for you to see a GI provider and you may have been referred to a specific GI provider, if so please call them. If for any reason, you cannot get in touch with your GI provider and or the one that you were recommended to see for follow up-  here are a list of local provider groups:     [facility]:  Call [phone] to set up an appointment.      [facility]: Call [phone] to set up an appointment

## 2023-09-04 NOTE — ED NOTES
Patient reports symptom improvement or at least no worsening of symptoms since arrival to ED. VSS at time of discharge. I have reviewed discharge instructions with the patient and family/friend, who verbalized understanding. Patient stable and discharged from ED via [address]  and with FAMILY/FRIEND.    If applicable, PIV removed yes       Jason Garrett RN  09/03/23 8672

## 2023-09-27 ENCOUNTER — OFFICE VISIT (OUTPATIENT)
Age: 23
End: 2023-09-27

## 2023-09-27 VITALS
OXYGEN SATURATION: 98 % | TEMPERATURE: 98.1 F | DIASTOLIC BLOOD PRESSURE: 65 MMHG | HEART RATE: 72 BPM | HEIGHT: 68 IN | WEIGHT: 180 LBS | RESPIRATION RATE: 18 BRPM | SYSTOLIC BLOOD PRESSURE: 110 MMHG | BODY MASS INDEX: 27.28 KG/M2

## 2023-09-27 DIAGNOSIS — G89.29 CHRONIC BILATERAL LOW BACK PAIN, UNSPECIFIED WHETHER SCIATICA PRESENT: ICD-10-CM

## 2023-09-27 DIAGNOSIS — K21.9 GASTROESOPHAGEAL REFLUX DISEASE, UNSPECIFIED WHETHER ESOPHAGITIS PRESENT: ICD-10-CM

## 2023-09-27 DIAGNOSIS — Z23 ENCOUNTER FOR IMMUNIZATION: ICD-10-CM

## 2023-09-27 DIAGNOSIS — Z83.3 FAMILY HISTORY OF DIABETES MELLITUS: ICD-10-CM

## 2023-09-27 DIAGNOSIS — Z00.00 ENCOUNTER FOR WELL ADULT EXAM WITHOUT ABNORMAL FINDINGS: Primary | ICD-10-CM

## 2023-09-27 DIAGNOSIS — Z79.899 LONG TERM CURRENT USE OF ANTIPSYCHOTIC MEDICATION: ICD-10-CM

## 2023-09-27 DIAGNOSIS — F25.9 SCHIZOAFFECTIVE DISORDER, UNSPECIFIED TYPE (HCC): ICD-10-CM

## 2023-09-27 DIAGNOSIS — M54.50 CHRONIC BILATERAL LOW BACK PAIN, UNSPECIFIED WHETHER SCIATICA PRESENT: ICD-10-CM

## 2023-09-27 RX ORDER — OMEPRAZOLE 40 MG/1
40 CAPSULE, DELAYED RELEASE ORAL
Qty: 90 CAPSULE | Refills: 1 | Status: SHIPPED | OUTPATIENT
Start: 2023-09-27

## 2023-09-27 RX ORDER — METHOCARBAMOL 750 MG/1
750 TABLET, FILM COATED ORAL 4 TIMES DAILY
COMMUNITY

## 2023-09-27 SDOH — ECONOMIC STABILITY: INCOME INSECURITY: HOW HARD IS IT FOR YOU TO PAY FOR THE VERY BASICS LIKE FOOD, HOUSING, MEDICAL CARE, AND HEATING?: NOT HARD AT ALL

## 2023-09-27 SDOH — ECONOMIC STABILITY: FOOD INSECURITY: WITHIN THE PAST 12 MONTHS, THE FOOD YOU BOUGHT JUST DIDN'T LAST AND YOU DIDN'T HAVE MONEY TO GET MORE.: NEVER TRUE

## 2023-09-27 SDOH — ECONOMIC STABILITY: HOUSING INSECURITY
IN THE LAST 12 MONTHS, WAS THERE A TIME WHEN YOU DID NOT HAVE A STEADY PLACE TO SLEEP OR SLEPT IN A SHELTER (INCLUDING NOW)?: NO

## 2023-09-27 SDOH — ECONOMIC STABILITY: FOOD INSECURITY: WITHIN THE PAST 12 MONTHS, YOU WORRIED THAT YOUR FOOD WOULD RUN OUT BEFORE YOU GOT MONEY TO BUY MORE.: NEVER TRUE

## 2023-09-28 LAB
ALBUMIN SERPL-MCNC: 3.4 G/DL (ref 3.5–5)
ALBUMIN/GLOB SERPL: 1.3 (ref 1.1–2.2)
ALP SERPL-CCNC: 61 U/L (ref 45–117)
ALT SERPL-CCNC: 26 U/L (ref 12–78)
ANION GAP SERPL CALC-SCNC: 3 MMOL/L (ref 5–15)
AST SERPL-CCNC: 15 U/L (ref 15–37)
BILIRUB SERPL-MCNC: 0.2 MG/DL (ref 0.2–1)
BUN SERPL-MCNC: 9 MG/DL (ref 6–20)
BUN/CREAT SERPL: 11 (ref 12–20)
CALCIUM SERPL-MCNC: 8.5 MG/DL (ref 8.5–10.1)
CHLORIDE SERPL-SCNC: 110 MMOL/L (ref 97–108)
CHOLEST SERPL-MCNC: 140 MG/DL
CO2 SERPL-SCNC: 26 MMOL/L (ref 21–32)
CREAT SERPL-MCNC: 0.83 MG/DL (ref 0.55–1.02)
ERYTHROCYTE [DISTWIDTH] IN BLOOD BY AUTOMATED COUNT: 13.5 % (ref 11.5–14.5)
EST. AVERAGE GLUCOSE BLD GHB EST-MCNC: 97 MG/DL
GLOBULIN SER CALC-MCNC: 2.7 G/DL (ref 2–4)
GLUCOSE SERPL-MCNC: 87 MG/DL (ref 65–100)
HBA1C MFR BLD: 5 % (ref 4–5.6)
HCT VFR BLD AUTO: 38.3 % (ref 35–47)
HDLC SERPL-MCNC: 48 MG/DL
HDLC SERPL: 2.9 (ref 0–5)
HGB BLD-MCNC: 12.1 G/DL (ref 11.5–16)
LDLC SERPL CALC-MCNC: 80.2 MG/DL (ref 0–100)
MCH RBC QN AUTO: 28.5 PG (ref 26–34)
MCHC RBC AUTO-ENTMCNC: 31.6 G/DL (ref 30–36.5)
MCV RBC AUTO: 90.1 FL (ref 80–99)
NRBC # BLD: 0 K/UL (ref 0–0.01)
NRBC BLD-RTO: 0 PER 100 WBC
PLATELET # BLD AUTO: 218 K/UL (ref 150–400)
PMV BLD AUTO: 12.6 FL (ref 8.9–12.9)
POTASSIUM SERPL-SCNC: 3.9 MMOL/L (ref 3.5–5.1)
PROT SERPL-MCNC: 6.1 G/DL (ref 6.4–8.2)
RBC # BLD AUTO: 4.25 M/UL (ref 3.8–5.2)
SODIUM SERPL-SCNC: 139 MMOL/L (ref 136–145)
TRIGL SERPL-MCNC: 59 MG/DL
TSH SERPL DL<=0.05 MIU/L-ACNC: 2.29 UIU/ML (ref 0.36–3.74)
VLDLC SERPL CALC-MCNC: 11.8 MG/DL
WBC # BLD AUTO: 5.6 K/UL (ref 3.6–11)

## 2023-10-06 ASSESSMENT — ENCOUNTER SYMPTOMS
BLOOD IN STOOL: 0
VOMITING: 0
RESPIRATORY NEGATIVE: 1
ALLERGIC/IMMUNOLOGIC NEGATIVE: 1
ABDOMINAL PAIN: 0
EYES NEGATIVE: 1
DIARRHEA: 0
CONSTIPATION: 0
BACK PAIN: 1

## 2023-10-14 ENCOUNTER — HOSPITAL ENCOUNTER (EMERGENCY)
Facility: HOSPITAL | Age: 23
Discharge: HOME | End: 2023-10-14
Attending: EMERGENCY MEDICINE
Payer: COMMERCIAL

## 2023-10-14 ENCOUNTER — APPOINTMENT (OUTPATIENT)
Facility: HOSPITAL | Age: 23
End: 2023-10-14
Payer: COMMERCIAL

## 2023-10-14 VITALS
DIASTOLIC BLOOD PRESSURE: 66 MMHG | OXYGEN SATURATION: 98 % | WEIGHT: 180 LBS | TEMPERATURE: 98.7 F | HEIGHT: 66 IN | HEART RATE: 80 BPM | SYSTOLIC BLOOD PRESSURE: 124 MMHG | RESPIRATION RATE: 18 BRPM | BODY MASS INDEX: 28.93 KG/M2

## 2023-10-14 DIAGNOSIS — R10.31 ABDOMINAL PAIN, RIGHT LOWER QUADRANT: Primary | ICD-10-CM

## 2023-10-14 LAB
ALBUMIN SERPL-MCNC: 4.3 G/DL (ref 3.5–5.2)
ALBUMIN/GLOB SERPL: 2 (ref 1.1–2.2)
ALP SERPL-CCNC: 64 U/L (ref 35–104)
ALT SERPL-CCNC: 12 U/L (ref 10–35)
ANION GAP SERPL CALC-SCNC: 10 MMOL/L (ref 5–15)
APPEARANCE UR: ABNORMAL
AST SERPL-CCNC: 15 U/L (ref 10–35)
BACTERIA URNS QL MICRO: ABNORMAL /HPF
BASOPHILS # BLD: 0 K/UL (ref 0–1)
BASOPHILS NFR BLD: 0 % (ref 0–1)
BILIRUB SERPL-MCNC: 0.2 MG/DL (ref 0.2–1)
BILIRUB UR QL: NEGATIVE
BUN SERPL-MCNC: 14 MG/DL (ref 6–20)
BUN/CREAT SERPL: 19 (ref 12–20)
CALCIUM SERPL-MCNC: 9.2 MG/DL (ref 8.6–10)
CHLORIDE SERPL-SCNC: 107 MMOL/L (ref 98–107)
CLUE CELLS VAG QL WET PREP: NORMAL
CO2 SERPL-SCNC: 23 MMOL/L (ref 22–29)
COLOR UR: ABNORMAL
CREAT SERPL-MCNC: 0.72 MG/DL (ref 0.5–0.9)
DIFFERENTIAL METHOD BLD: ABNORMAL
EOSINOPHIL # BLD: 0.1 K/UL (ref 0–0.4)
EOSINOPHIL NFR BLD: 1 %
EPITH CASTS URNS QL MICRO: ABNORMAL /LPF
ERYTHROCYTE [DISTWIDTH] IN BLOOD BY AUTOMATED COUNT: 13 % (ref 11.5–14.5)
GLOBULIN SER CALC-MCNC: 2.2 G/DL (ref 2–4)
GLUCOSE SERPL-MCNC: 99 MG/DL (ref 65–100)
GLUCOSE UR STRIP.AUTO-MCNC: NEGATIVE MG/DL
HCG SERPL-ACNC: <1 MIU/ML
HCT VFR BLD AUTO: 37.3 % (ref 35–47)
HGB BLD-MCNC: 11.8 G/DL (ref 11.5–16)
HGB UR QL STRIP: NEGATIVE
IMM GRANULOCYTES # BLD AUTO: 0 K/UL (ref 0–0.04)
IMM GRANULOCYTES NFR BLD AUTO: 0 % (ref 0–0.5)
KETONES UR QL STRIP.AUTO: NEGATIVE MG/DL
KOH PREP SPEC: NORMAL
LEUKOCYTE ESTERASE UR QL STRIP.AUTO: NEGATIVE
LIPASE SERPL-CCNC: 35 U/L (ref 13–60)
LYMPHOCYTES # BLD: 1.7 K/UL (ref 0.8–3.5)
LYMPHOCYTES NFR BLD: 20 % (ref 12–49)
MCH RBC QN AUTO: 28 PG (ref 26–34)
MCHC RBC AUTO-ENTMCNC: 31.6 G/DL (ref 30–36.5)
MCV RBC AUTO: 88.4 FL (ref 80–99)
MONOCYTES # BLD: 0.7 K/UL (ref 0–1)
MONOCYTES NFR BLD: 9 % (ref 5–13)
NEUTS SEG # BLD: 5.7 K/UL (ref 1.8–8)
NEUTS SEG NFR BLD: 70 % (ref 32–75)
NITRITE UR QL STRIP.AUTO: NEGATIVE
NRBC # BLD: 0 K/UL (ref 0–0.01)
NRBC BLD-RTO: 0 PER 100 WBC
PH UR STRIP: 7.5 (ref 5–8)
PLATELET # BLD AUTO: 261 K/UL (ref 150–400)
PMV BLD AUTO: 11.5 FL (ref 8.9–12.9)
POTASSIUM SERPL-SCNC: 3.9 MMOL/L (ref 3.5–5.1)
PROT SERPL-MCNC: 6.5 G/DL (ref 6.4–8.3)
PROT UR STRIP-MCNC: NEGATIVE MG/DL
RBC # BLD AUTO: 4.22 M/UL (ref 3.8–5.2)
RBC #/AREA URNS HPF: ABNORMAL /HPF
SERVICE CMNT-IMP: NORMAL
SODIUM SERPL-SCNC: 140 MMOL/L (ref 136–145)
SP GR UR REFRACTOMETRY: 1.02 (ref 1–1.03)
SPECIMEN HOLD: NORMAL
T VAGINALIS VAG QL WET PREP: NORMAL
UROBILINOGEN UR QL STRIP.AUTO: 0.2 EU/DL (ref 0.2–1)
WBC # BLD AUTO: 8.2 K/UL (ref 3.6–11)
WBC URNS QL MICRO: ABNORMAL /HPF (ref 0–4)

## 2023-10-14 PROCEDURE — 96374 THER/PROPH/DIAG INJ IV PUSH: CPT

## 2023-10-14 PROCEDURE — 6360000004 HC RX CONTRAST MEDICATION: Performed by: EMERGENCY MEDICINE

## 2023-10-14 PROCEDURE — 76830 TRANSVAGINAL US NON-OB: CPT

## 2023-10-14 PROCEDURE — 87210 SMEAR WET MOUNT SALINE/INK: CPT

## 2023-10-14 PROCEDURE — 36415 COLL VENOUS BLD VENIPUNCTURE: CPT

## 2023-10-14 PROCEDURE — 84702 CHORIONIC GONADOTROPIN TEST: CPT

## 2023-10-14 PROCEDURE — 76705 ECHO EXAM OF ABDOMEN: CPT

## 2023-10-14 PROCEDURE — 87491 CHLMYD TRACH DNA AMP PROBE: CPT

## 2023-10-14 PROCEDURE — 85025 COMPLETE CBC W/AUTO DIFF WBC: CPT

## 2023-10-14 PROCEDURE — 99285 EMERGENCY DEPT VISIT HI MDM: CPT

## 2023-10-14 PROCEDURE — 80053 COMPREHEN METABOLIC PANEL: CPT

## 2023-10-14 PROCEDURE — 81025 URINE PREGNANCY TEST: CPT

## 2023-10-14 PROCEDURE — 81001 URINALYSIS AUTO W/SCOPE: CPT

## 2023-10-14 PROCEDURE — 83690 ASSAY OF LIPASE: CPT

## 2023-10-14 PROCEDURE — 74177 CT ABD & PELVIS W/CONTRAST: CPT

## 2023-10-14 PROCEDURE — 6360000002 HC RX W HCPCS: Performed by: EMERGENCY MEDICINE

## 2023-10-14 PROCEDURE — 87591 N.GONORRHOEAE DNA AMP PROB: CPT

## 2023-10-14 RX ORDER — KETOROLAC TROMETHAMINE 30 MG/ML
30 INJECTION, SOLUTION INTRAMUSCULAR; INTRAVENOUS
Status: COMPLETED | OUTPATIENT
Start: 2023-10-14 | End: 2023-10-14

## 2023-10-14 RX ADMIN — IOPAMIDOL 100 ML: 755 INJECTION, SOLUTION INTRAVENOUS at 06:09

## 2023-10-14 RX ADMIN — KETOROLAC TROMETHAMINE 30 MG: 30 INJECTION, SOLUTION INTRAMUSCULAR; INTRAVENOUS at 02:33

## 2023-10-14 ASSESSMENT — ENCOUNTER SYMPTOMS
ABDOMINAL DISTENTION: 0
ANAL BLEEDING: 0
ABDOMINAL PAIN: 0
SHORTNESS OF BREATH: 0
COUGH: 0
NAUSEA: 0
VOMITING: 0
DIARRHEA: 0
BLOOD IN STOOL: 0

## 2023-10-14 ASSESSMENT — PAIN DESCRIPTION - ORIENTATION: ORIENTATION: RIGHT;LOWER

## 2023-10-14 ASSESSMENT — PAIN DESCRIPTION - PAIN TYPE: TYPE: ACUTE PAIN

## 2023-10-14 ASSESSMENT — LIFESTYLE VARIABLES
HOW OFTEN DO YOU HAVE A DRINK CONTAINING ALCOHOL: MONTHLY OR LESS
HOW MANY STANDARD DRINKS CONTAINING ALCOHOL DO YOU HAVE ON A TYPICAL DAY: 1 OR 2

## 2023-10-14 ASSESSMENT — PAIN DESCRIPTION - FREQUENCY: FREQUENCY: CONTINUOUS

## 2023-10-14 ASSESSMENT — PAIN SCALES - GENERAL
PAINLEVEL_OUTOF10: 6
PAINLEVEL_OUTOF10: 6

## 2023-10-14 ASSESSMENT — PAIN DESCRIPTION - ONSET: ONSET: ON-GOING

## 2023-10-14 ASSESSMENT — PAIN DESCRIPTION - LOCATION: LOCATION: ABDOMEN

## 2023-10-14 ASSESSMENT — PAIN - FUNCTIONAL ASSESSMENT
PAIN_FUNCTIONAL_ASSESSMENT: 0-10
PAIN_FUNCTIONAL_ASSESSMENT: NONE - DENIES PAIN

## 2023-10-14 ASSESSMENT — PAIN DESCRIPTION - DESCRIPTORS: DESCRIPTORS: CRAMPING

## 2023-10-14 NOTE — ED PROVIDER NOTES
[facility] EMERGENCY DEPT  EMERGENCY DEPARTMENT ENCOUNTER      Pt Name: Luiz Whelan  MRN: [id number]  [address] [place] 2000  Date of evaluation: 10/14/2023  Provider: Radha Batista MD    [address]       Chief Complaint   Patient presents with    Abdominal Pain         HISTORY OF PRESENT ILLNESS   (Location/Symptom, Timing/Onset, Context/Setting, Quality, Duration, Modifying Factors, Severity)  Note limiting factors. 23F w/ hx schizoaffective p/w 1wk pelvic pain. Pt reports 1wk lower abd pain radiating to her right side w/ nausea. Also notes vaginal odor, discharge and bleeding but unable to quantify how much or if any clots. No vomiting, diarrhea, urinary symptoms. No CP/SOB, dizziness or syncope. No prior abd surgeries. Review of External Medical Records:     Nursing Notes were reviewed. REVIEW OF SYSTEMS    (2-9 systems for level 4, 10 or more for level 5)     Review of Systems   Constitutional:  Negative for diaphoresis and fever. HENT:  Negative for nosebleeds. Eyes:  Negative for visual disturbance. Respiratory:  Negative for cough and shortness of breath. Cardiovascular:  Negative for chest pain, palpitations and leg swelling. Gastrointestinal:  Negative for abdominal distention, abdominal pain, anal bleeding, blood in stool, diarrhea, nausea and vomiting. Endocrine: Negative for polyuria. Genitourinary:  Positive for pelvic pain, vaginal bleeding and vaginal discharge. Negative for difficulty urinating, dysuria, frequency and hematuria. Musculoskeletal:  Negative for joint swelling. Skin:  Negative for wound. Allergic/Immunologic: Negative for immunocompromised state. Neurological:  Negative for seizures and syncope. Hematological:  Does not bruise/bleed easily. Psychiatric/Behavioral:  Negative for confusion. Except as noted above the remainder of the review of systems was reviewed and negative.        PAST MEDICAL HISTORY     Past Medical History:

## 2023-10-14 NOTE — ED TRIAGE NOTES
Pt arrived via pov w/sig other, states on and off x1week of abd pain/cramping, unk if preg, states vaginal bleeding/odor, states lmp 09/28/23. States r-lower abd cramping 6/10.  Nausea no vomiting, no fevers, h/o constipations and ovarian cysts

## 2023-10-14 NOTE — ED NOTES
POC pregnancy test completed by this RN was negative with acceptable control line. Results did not transfer data to chart. MD Heart made aware.       Odette Eckert RN  10/14/23 9925

## 2023-10-14 NOTE — ED NOTES
Pt given discharge instructions, pt education, 0 prescriptions and follow up information. Pt verbalizes understanding. All questions answered. Pt discharged to home in private vehicle, ambulatory. Pt A&O x4, RA, pain controlled.       Roberto Bailey RN  10/14/23 4611

## 2023-10-16 LAB — HCG UR QL: NEGATIVE

## 2023-11-11 ENCOUNTER — HOSPITAL ENCOUNTER (EMERGENCY)
Facility: HOSPITAL | Age: 23
Discharge: HOME | End: 2023-11-12
Attending: STUDENT IN AN ORGANIZED HEALTH CARE EDUCATION/TRAINING PROGRAM
Payer: COMMERCIAL

## 2023-11-11 VITALS
BODY MASS INDEX: 29.07 KG/M2 | TEMPERATURE: 98.7 F | DIASTOLIC BLOOD PRESSURE: 64 MMHG | OXYGEN SATURATION: 98 % | HEIGHT: 67 IN | WEIGHT: 185.19 LBS | SYSTOLIC BLOOD PRESSURE: 114 MMHG | HEART RATE: 87 BPM | RESPIRATION RATE: 15 BRPM

## 2023-11-11 DIAGNOSIS — R10.9 ABDOMINAL PAIN, UNSPECIFIED ABDOMINAL LOCATION: Primary | ICD-10-CM

## 2023-11-11 LAB
ALBUMIN SERPL-MCNC: 4.6 G/DL (ref 3.5–5.2)
ALBUMIN/GLOB SERPL: 1.8 (ref 1.1–2.2)
ALP SERPL-CCNC: 75 U/L (ref 35–104)
ALT SERPL-CCNC: 10 U/L (ref 10–35)
ANION GAP SERPL CALC-SCNC: 11 MMOL/L (ref 5–15)
AST SERPL-CCNC: 16 U/L (ref 10–35)
BASOPHILS # BLD: 0 K/UL (ref 0–1)
BASOPHILS NFR BLD: 1 % (ref 0–1)
BILIRUB SERPL-MCNC: 0.4 MG/DL (ref 0.2–1)
BUN SERPL-MCNC: 6 MG/DL (ref 6–20)
BUN/CREAT SERPL: 7 (ref 12–20)
CALCIUM SERPL-MCNC: 9.5 MG/DL (ref 8.6–10)
CHLORIDE SERPL-SCNC: 106 MMOL/L (ref 98–107)
CO2 SERPL-SCNC: 26 MMOL/L (ref 22–29)
COMMENT:: NORMAL
CREAT SERPL-MCNC: 0.82 MG/DL (ref 0.5–0.9)
DIFFERENTIAL METHOD BLD: ABNORMAL
EOSINOPHIL # BLD: 0.1 K/UL (ref 0–0.4)
EOSINOPHIL NFR BLD: 1 %
ERYTHROCYTE [DISTWIDTH] IN BLOOD BY AUTOMATED COUNT: 12.7 % (ref 11.5–14.5)
GLOBULIN SER CALC-MCNC: 2.6 G/DL (ref 2–4)
GLUCOSE SERPL-MCNC: 127 MG/DL (ref 65–100)
HCG UR QL: NEGATIVE
HCT VFR BLD AUTO: 38 % (ref 35–47)
HGB BLD-MCNC: 12.2 G/DL (ref 11.5–16)
IMM GRANULOCYTES # BLD AUTO: 0 K/UL (ref 0–0.04)
IMM GRANULOCYTES NFR BLD AUTO: 0 % (ref 0–0.5)
LIPASE SERPL-CCNC: 27 U/L (ref 13–60)
LYMPHOCYTES # BLD: 1.2 K/UL (ref 0.8–3.5)
LYMPHOCYTES NFR BLD: 20 % (ref 12–49)
MCH RBC QN AUTO: 28.7 PG (ref 26–34)
MCHC RBC AUTO-ENTMCNC: 32.1 G/DL (ref 30–36.5)
MCV RBC AUTO: 89.4 FL (ref 80–99)
MONOCYTES # BLD: 0.5 K/UL (ref 0–1)
MONOCYTES NFR BLD: 8 % (ref 5–13)
NEUTS SEG # BLD: 4.2 K/UL (ref 1.8–8)
NEUTS SEG NFR BLD: 70 % (ref 32–75)
NRBC # BLD: 0 K/UL (ref 0–0.01)
NRBC BLD-RTO: 0 PER 100 WBC
PLATELET # BLD AUTO: 227 K/UL (ref 150–400)
PMV BLD AUTO: 12 FL (ref 8.9–12.9)
POTASSIUM SERPL-SCNC: 3.6 MMOL/L (ref 3.5–5.1)
PROT SERPL-MCNC: 7.2 G/DL (ref 6.4–8.3)
RBC # BLD AUTO: 4.25 M/UL (ref 3.8–5.2)
SODIUM SERPL-SCNC: 143 MMOL/L (ref 136–145)
SPECIMEN HOLD: NORMAL
WBC # BLD AUTO: 6 K/UL (ref 3.6–11)

## 2023-11-11 PROCEDURE — 36415 COLL VENOUS BLD VENIPUNCTURE: CPT

## 2023-11-11 PROCEDURE — 87186 SC STD MICRODIL/AGAR DIL: CPT

## 2023-11-11 PROCEDURE — 81025 URINE PREGNANCY TEST: CPT

## 2023-11-11 PROCEDURE — 87077 CULTURE AEROBIC IDENTIFY: CPT

## 2023-11-11 PROCEDURE — 80053 COMPREHEN METABOLIC PANEL: CPT

## 2023-11-11 PROCEDURE — 85025 COMPLETE CBC W/AUTO DIFF WBC: CPT

## 2023-11-11 PROCEDURE — 99285 EMERGENCY DEPT VISIT HI MDM: CPT

## 2023-11-11 PROCEDURE — 81001 URINALYSIS AUTO W/SCOPE: CPT

## 2023-11-11 PROCEDURE — 83690 ASSAY OF LIPASE: CPT

## 2023-11-11 PROCEDURE — 87086 URINE CULTURE/COLONY COUNT: CPT

## 2023-11-11 ASSESSMENT — PAIN DESCRIPTION - LOCATION: LOCATION: ABDOMEN

## 2023-11-11 ASSESSMENT — PAIN DESCRIPTION - DESCRIPTORS: DESCRIPTORS: STABBING

## 2023-11-11 ASSESSMENT — PAIN SCALES - GENERAL: PAINLEVEL_OUTOF10: 7

## 2023-11-11 ASSESSMENT — PAIN DESCRIPTION - FREQUENCY: FREQUENCY: CONTINUOUS

## 2023-11-11 ASSESSMENT — PAIN DESCRIPTION - ORIENTATION: ORIENTATION: LEFT

## 2023-11-11 ASSESSMENT — PAIN - FUNCTIONAL ASSESSMENT: PAIN_FUNCTIONAL_ASSESSMENT: 0-10

## 2023-11-12 ENCOUNTER — APPOINTMENT (OUTPATIENT)
Facility: HOSPITAL | Age: 23
End: 2023-11-12
Payer: COMMERCIAL

## 2023-11-12 LAB
APPEARANCE UR: ABNORMAL
BACTERIA URNS QL MICRO: ABNORMAL /HPF
BILIRUB UR QL: NEGATIVE
COLOR UR: ABNORMAL
EPITH CASTS URNS QL MICRO: ABNORMAL /LPF
GLUCOSE UR STRIP.AUTO-MCNC: NEGATIVE MG/DL
HGB UR QL STRIP: NEGATIVE
HYALINE CASTS URNS QL MICRO: ABNORMAL /LPF
KETONES UR QL STRIP.AUTO: 15 MG/DL
LEUKOCYTE ESTERASE UR QL STRIP.AUTO: ABNORMAL
NITRITE UR QL STRIP.AUTO: POSITIVE
PH UR STRIP: 7 (ref 5–8)
PROT UR STRIP-MCNC: NEGATIVE MG/DL
RBC #/AREA URNS HPF: ABNORMAL /HPF
SP GR UR REFRACTOMETRY: 1.02 (ref 1–1.03)
URINE CULTURE IF INDICATED: ABNORMAL
UROBILINOGEN UR QL STRIP.AUTO: 0.2 EU/DL (ref 0.2–1)
WBC URNS QL MICRO: ABNORMAL /HPF (ref 0–4)

## 2023-11-12 PROCEDURE — 74177 CT ABD & PELVIS W/CONTRAST: CPT

## 2023-11-12 PROCEDURE — 6360000004 HC RX CONTRAST MEDICATION: Performed by: STUDENT IN AN ORGANIZED HEALTH CARE EDUCATION/TRAINING PROGRAM

## 2023-11-12 RX ADMIN — IOPAMIDOL 100 ML: 755 INJECTION, SOLUTION INTRAVENOUS at 00:55

## 2023-11-12 ASSESSMENT — PAIN - FUNCTIONAL ASSESSMENT: PAIN_FUNCTIONAL_ASSESSMENT: NONE - DENIES PAIN

## 2023-11-12 NOTE — ED NOTES
Patient reports symptom improvement or at least no worsening of symptoms since arrival to ED. Patient denies pain. I have reviewed discharge instructions with the patient and spouse, who verbalized understanding. Patient stable and discharged from ED via [address]  and with FAMILY/FRIEND.    If applicable, PIV removed yes       Link Level, RN  11/12/23 7272

## 2023-11-12 NOTE — ED TRIAGE NOTES
Pt presents to Ed with fiance. Pt states that the day before yesterday she began to have abdominal pain on her left side. Nausea, vomiting starting at 1300, epigastric pain with deep breathing began today around 1500. Pt reports weakness. Pt denies fever, diarrhea. Pt has seen GI doctor and is going for a follow up on January 3. Pt states she has a predisposition for HOLLEY and has been diagnosed with stomach inflammation in June of this year. Pt sitting in triage chair, respirations unlabored.

## 2023-11-12 NOTE — ED PROVIDER NOTES
[facility] EMERGENCY DEPT  EMERGENCY DEPARTMENT ENCOUNTER      Pt Name: Suellen Harris  MRN: [id number]  [address] 2000  Date of evaluation: 11/11/2023  Provider: Tony Harris DO    CHIEF COMPLAINT       Chief Complaint   Patient presents with    Abdominal Pain    Nausea       PMH   Past Medical History:   Diagnosis Date    ADHD     Anxiety     Anxiety     Bipolar disorder ([address])     Borderline personality disorder ([address])     Depression     Headache     Multiple personality disorder ([address])     PTSD (post-traumatic stress disorder)     Reactive attachment disorder          MDM:   Vitals:    Vitals:    11/11/23 2252   BP: 114/64   Pulse:    Resp:    Temp:    SpO2:            This is a 26 y.o. adult with pmhx psychiatric disorders, GERD who presents today for cc of abdominal pain. Patient states that over the last day she has had some generalized abdominal pain that sometimes moves from left to right. Mostly in the upper abdomen. She describes as 4 out of 10, intermittent, stabbing in nature. Associate with some nausea and she had 1 episode of emesis that was nonbloody nonbilious today. No diarrhea or constipation, no urinary symptoms, no fever or chills. She is with abdominal pain and follows with GI. No chest pain or dyspnea. On arrival VS stable. Physical Exam  General: Alert, no acute distress  HEENT: Normocephalic, atraumatic. EOMI, moist oral mucosa, no conjunctival injection  Neck: ROM normal, supple  Cardio: Heart regular rate and rhythm, cap refill <2seconds  Lungs: No respiratory distress, no wheezing, CTAB  Abdomen: Soft, minimally tender diffusely without rebound or guarding  MSK: ROM normal, no LE edema  Skin: Warm, dry, no rash  Neuro: No focal neurodeficits, Aox3    Labs grossly unremarkable. Urinalysis was borderline but patient does not have any urinary symptoms and is agreeable with waiting for urine culture prior to starting any antibiotics.   A CT was performed which showed no

## 2023-11-14 LAB
BACTERIA SPEC CULT: ABNORMAL
CC UR VC: ABNORMAL
SERVICE CMNT-IMP: ABNORMAL

## 2023-11-14 RX ORDER — CEPHALEXIN 500 MG/1
500 CAPSULE ORAL 2 TIMES DAILY
Qty: 10 CAPSULE | Refills: 0 | Status: SHIPPED | OUTPATIENT
Start: 2023-11-14 | End: 2023-11-19
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
## 2024-01-11 ENCOUNTER — OFFICE VISIT (OUTPATIENT)
Age: 24
End: 2024-01-11

## 2024-01-11 VITALS
HEART RATE: 72 BPM | SYSTOLIC BLOOD PRESSURE: 105 MMHG | RESPIRATION RATE: 16 BRPM | OXYGEN SATURATION: 97 % | BODY MASS INDEX: 28.25 KG/M2 | WEIGHT: 180 LBS | TEMPERATURE: 98.1 F | DIASTOLIC BLOOD PRESSURE: 71 MMHG | HEIGHT: 67 IN

## 2024-01-11 DIAGNOSIS — F25.0 SCHIZOAFFECTIVE DISORDER, BIPOLAR TYPE (HCC): ICD-10-CM

## 2024-01-11 DIAGNOSIS — K21.9 GASTROESOPHAGEAL REFLUX DISEASE, UNSPECIFIED WHETHER ESOPHAGITIS PRESENT: Primary | ICD-10-CM

## 2024-01-11 ASSESSMENT — ENCOUNTER SYMPTOMS
ABDOMINAL PAIN: 1
BACK PAIN: 1

## 2024-01-11 NOTE — PROGRESS NOTES
Chief Complaint   Patient presents with    Abdominal Pain    Pregnancy Problem     Nausea, OB:  Dr Genao    Urinary Tract Infection     Treating with Keflex    Back Pain

## 2024-01-11 NOTE — PROGRESS NOTES
Subjective:      Patient ID: Destiny R Saint is a 23 y.o. adult.    Abdominal Pain    Urinary Tract Infection    Back Pain  Associated symptoms include abdominal pain.     Patient is here for discussion regarding her pregnancy  Was followed for bipolar and anxiety  Stopped all meds except for her trazodone because she can't sleep  Did not touch base with psych  Did see gyn on 12/28, approx 10 weeks gestation  Still using her omeprazole for gerd    Review of Systems   Gastrointestinal:  Positive for abdominal pain.   Musculoskeletal:  Positive for back pain.     A comprehensive review of system was obtained and negative except findings in the HPI    /71   Pulse 72   Temp 98.1 °F (36.7 °C) (Oral)   Resp 16   Ht 1.702 m (5' 7\")   Wt 81.6 kg (180 lb)   LMP 10/29/2023   SpO2 97%   BMI 28.19 kg/m²   Objective:   Physical Exam  Vitals and nursing note reviewed.   Constitutional:       General: Destiny R Saint is not in acute distress.     Appearance: Normal appearance.   Cardiovascular:      Rate and Rhythm: Normal rate and regular rhythm.   Pulmonary:      Effort: Pulmonary effort is normal. No respiratory distress.      Breath sounds: Normal breath sounds. No wheezing or rhonchi.   Musculoskeletal:         General: No swelling.   Neurological:      General: No focal deficit present.      Mental Status: Destiny R Saint is alert and oriented to person, place, and time.   Psychiatric:         Mood and Affect: Mood normal.         Assessment / Plan:   Irena was seen today for abdominal pain, pregnancy problem, urinary tract infection and back pain.    Diagnoses and all orders for this visit:    Gastroesophageal reflux disease, unspecified whether esophagitis present    Schizoaffective disorder, bipolar type (HCC)      Reviewed med list in detail  Advised that trazodone is not safe and she needs to consult with gyn asap for recommendations  Cont to take omeprazole for gerd  Follow up prn    I have discussed

## 2024-01-25 LAB
ABO, EXTERNAL RESULT: NORMAL
C. TRACHOMATIS, EXTERNAL RESULT: NEGATIVE
HEP B, EXTERNAL RESULT: NEGATIVE
HEPATITIS C ANTIBODY, EXTERNAL RESULT: NORMAL
HIV, EXTERNAL RESULT: NORMAL
N. GONORRHOEAE, EXTERNAL RESULT: NEGATIVE
RH FACTOR, EXTERNAL RESULT: POSITIVE
RPR, EXTERNAL RESULT: NORMAL
RUBELLA TITER, EXTERNAL RESULT: 1.17

## 2024-02-23 ENCOUNTER — HOSPITAL ENCOUNTER (EMERGENCY)
Facility: HOSPITAL | Age: 24
Discharge: HOME | End: 2024-02-23
Attending: EMERGENCY MEDICINE
Payer: COMMERCIAL

## 2024-02-23 VITALS
OXYGEN SATURATION: 99 % | TEMPERATURE: 98.4 F | RESPIRATION RATE: 18 BRPM | HEART RATE: 80 BPM | DIASTOLIC BLOOD PRESSURE: 75 MMHG | SYSTOLIC BLOOD PRESSURE: 121 MMHG

## 2024-02-23 DIAGNOSIS — J02.9 ACUTE PHARYNGITIS, UNSPECIFIED ETIOLOGY: Primary | ICD-10-CM

## 2024-02-23 LAB
DEPRECATED S PYO AG THROAT QL EIA: NEGATIVE
FLUAV AG NPH QL IA: NEGATIVE
FLUBV AG NOSE QL IA: NEGATIVE
SARS-COV-2 RDRP RESP QL NAA+PROBE: NOT DETECTED
SOURCE: NORMAL

## 2024-02-23 PROCEDURE — 87880 STREP A ASSAY W/OPTIC: CPT

## 2024-02-23 PROCEDURE — 87804 INFLUENZA ASSAY W/OPTIC: CPT

## 2024-02-23 PROCEDURE — 99283 EMERGENCY DEPT VISIT LOW MDM: CPT

## 2024-02-23 PROCEDURE — 6370000000 HC RX 637 (ALT 250 FOR IP): Performed by: EMERGENCY MEDICINE

## 2024-02-23 PROCEDURE — 87070 CULTURE OTHR SPECIMN AEROBIC: CPT

## 2024-02-23 PROCEDURE — 87635 SARS-COV-2 COVID-19 AMP PRB: CPT

## 2024-02-23 RX ORDER — ACETAMINOPHEN 500 MG
1000 TABLET ORAL
Status: COMPLETED | OUTPATIENT
Start: 2024-02-23 | End: 2024-02-23

## 2024-02-23 RX ORDER — LIDOCAINE HYDROCHLORIDE 20 MG/ML
15 SOLUTION OROPHARYNGEAL ONCE
Status: COMPLETED | OUTPATIENT
Start: 2024-02-23 | End: 2024-02-23

## 2024-02-23 RX ADMIN — LIDOCAINE HYDROCHLORIDE 15 ML: 20 SOLUTION ORAL; TOPICAL at 01:48

## 2024-02-23 RX ADMIN — ACETAMINOPHEN 1000 MG: 500 TABLET ORAL at 01:48

## 2024-02-23 ASSESSMENT — PAIN SCALES - GENERAL: PAINLEVEL_OUTOF10: 4

## 2024-02-23 ASSESSMENT — LIFESTYLE VARIABLES
HOW OFTEN DO YOU HAVE A DRINK CONTAINING ALCOHOL: NEVER
HOW MANY STANDARD DRINKS CONTAINING ALCOHOL DO YOU HAVE ON A TYPICAL DAY: PATIENT DOES NOT DRINK

## 2024-02-23 NOTE — ED NOTES
Discharge instructions have been discussed with patient. Opportunity for questions and further education has been provided to patient. Patient  expressed understanding. Patient understand to seek Emergency Medical attention for chest pain or new or worsening symptoms.

## 2024-02-23 NOTE — DISCHARGE INSTRUCTIONS
It was a pleasure taking care of you in our Emergency Department today.  We know that when you come to [facility], you are entrusting us with your health, comfort, and safety.  Our physicians and nurses honor that trust, and truly appreciate the opportunity to care for you and your loved ones.    We also value your feedback.  If you receive a survey about your Emergency Department experience today, please fill it out.  We care about our patients' feedback, and we listen to what you have to say.  Thank you!       --- Dr. Dominique Crespo MD

## 2024-02-25 LAB
BACTERIA SPEC CULT: NORMAL
SERVICE CMNT-IMP: NORMAL

## 2024-03-05 ASSESSMENT — ENCOUNTER SYMPTOMS
DIARRHEA: 0
BACK PAIN: 0
NAUSEA: 0
VOICE CHANGE: 0
EYE PAIN: 0
PHOTOPHOBIA: 0
COLOR CHANGE: 0
ABDOMINAL PAIN: 0
RHINORRHEA: 0
VOMITING: 0
SORE THROAT: 1
FACIAL SWELLING: 0
SINUS PRESSURE: 0
COUGH: 0
SHORTNESS OF BREATH: 0
TROUBLE SWALLOWING: 0

## 2024-03-05 NOTE — ED PROVIDER NOTES
for toxicity.  Decision regarding hospitalization.   Social determinants of health, if present addressed per documentation above under mdm    FINAL IMPRESSION     1. Acute pharyngitis, unspecified etiology          DISPOSITION/PLAN         DISPOSITION: DISCHARGE  The patient's results have been reviewed with patient and available family and/or caregiver. They verbally convey their understanding and agreement of the patient's signs, symptoms, diagnosis, treatment and prognosis and additionally agree to follow up as recommended in the discharge instructions or to return to the Emergency Department should the patient's condition change prior to their follow-up appointment.   The patient and available family and/or caregiver verbally agree with the care plan and all of their questions have been answered. The discharge instructions have also been provided to the them with educational information regarding the patient's diagnosis as well a list of reasons why the patient would want to return to the ER prior to their follow-up appointment should any concerns arise, the patient's condition change or symptoms worsen.    Dominique Crespo MD, Msc    PLAN:     Medication List        ASK your doctor about these medications      benzocaine-menthol 15-3.6 MG lozenge  Commonly known as: CEPACOL SORE THROAT  Take 1 lozenge by mouth every 2 hours as needed for Sore Throat  Ask about: Should I take this medication?     omeprazole 40 MG delayed release capsule  Commonly known as: PRILOSEC  Take 1 capsule by mouth every morning (before breakfast)     ondansetron 4 MG disintegrating tablet  Commonly known as: ZOFRAN-ODT  Take 1 tablet by mouth 3 times daily as needed for Nausea or Vomiting               Where to Get Your Medications        These medications were sent to [id number] [place], [place] - [address] [place] [address] [phone] - F [phone] [address] [place] [id number]      Phone: [phone]

## 2024-04-02 ENCOUNTER — HOSPITAL ENCOUNTER (OUTPATIENT)
Facility: HOSPITAL | Age: 24
Discharge: HOME | End: 2024-04-02
Attending: OBSTETRICS & GYNECOLOGY | Admitting: OBSTETRICS & GYNECOLOGY
Payer: COMMERCIAL

## 2024-04-02 VITALS
DIASTOLIC BLOOD PRESSURE: 66 MMHG | SYSTOLIC BLOOD PRESSURE: 129 MMHG | OXYGEN SATURATION: 99 % | HEART RATE: 85 BPM | RESPIRATION RATE: 14 BRPM | TEMPERATURE: 97.8 F

## 2024-04-02 LAB
AMNISURE, POC: NEGATIVE
CLUE CELLS VAG QL WET PREP: ABNORMAL
Lab: ABNORMAL
NEGATIVE QC PASS/FAIL: ABNORMAL
POSITIVE QC PASS/FAIL: ABNORMAL
T VAGINALIS VAG QL WET PREP: ABNORMAL
YEAST: ABNORMAL

## 2024-04-02 PROCEDURE — 87210 SMEAR WET MOUNT SALINE/INK: CPT

## 2024-04-02 PROCEDURE — 99203 OFFICE O/P NEW LOW 30 MIN: CPT

## 2024-04-02 RX ORDER — CLINDAMYCIN HYDROCHLORIDE 150 MG/1
150 CAPSULE ORAL 2 TIMES DAILY
Qty: 14 CAPSULE | Refills: 0 | Status: SHIPPED | OUTPATIENT
Start: 2024-04-02 | End: 2024-04-09

## 2024-04-02 RX ORDER — CLINDAMYCIN HYDROCHLORIDE 150 MG/1
150 CAPSULE ORAL 2 TIMES DAILY
Qty: 14 CAPSULE | Refills: 0 | Status: SHIPPED | OUTPATIENT
Start: 2024-04-02 | End: 2024-04-02

## 2024-04-02 RX ORDER — CLINDAMYCIN HYDROCHLORIDE 150 MG/1
150 CAPSULE ORAL 2 TIMES DAILY
Status: DISCONTINUED | OUTPATIENT
Start: 2024-04-02 | End: 2024-04-02 | Stop reason: HOSPADM

## 2024-04-02 NOTE — PROGRESS NOTES
0146- this patient is a  at [age] who presents to triage with a primary complaint of leaking of fluid starting approximately 2 weeks ago. She describes the fluid as yellow in color. She states she saw her OB NP who suggested a swab but patient refused at that time. She gives a medical history of bipolar,borderline personality disorder, anxiety,depression,schizophrenia and ptsd. She denies a surgical history. This RN notified OB hospitalist of patient arrival      0215- Dr Clarke bedside SVE closed    Amnisure lot [id number]  Exp 2026-10-27    0358-DR Clarke bedside, plan to discharge home

## 2024-04-29 ENCOUNTER — INITIAL PRENATAL (OUTPATIENT)
Age: 24
End: 2024-04-29

## 2024-04-29 VITALS
WEIGHT: 209.2 LBS | DIASTOLIC BLOOD PRESSURE: 70 MMHG | SYSTOLIC BLOOD PRESSURE: 112 MMHG | HEIGHT: 67 IN | BODY MASS INDEX: 32.83 KG/M2

## 2024-04-29 DIAGNOSIS — Z3A.26 26 WEEKS GESTATION OF PREGNANCY: Primary | ICD-10-CM

## 2024-04-29 PROBLEM — R87.610 ASCUS WITH POSITIVE HIGH RISK HPV CERVICAL: Status: ACTIVE | Noted: 2024-01-25

## 2024-04-29 PROBLEM — R87.810 ASCUS WITH POSITIVE HIGH RISK HPV CERVICAL: Status: ACTIVE | Noted: 2024-01-25

## 2024-04-29 PROCEDURE — 0500F INITIAL PRENATAL CARE VISIT: CPT | Performed by: OBSTETRICS & GYNECOLOGY

## 2024-04-29 NOTE — PROGRESS NOTES
Destiny R Saint is a 23 y.o. adult presents for a new pregnancy visit.    Chief Complaint   Patient presents with    Initial Prenatal Visit      Transfer OB from [facility]    Patient's last menstrual period was 10/29/2023.    Last Pap: abnormal ASCUS obtained 2023.    LMP history:  The date of her LMP is 10/29/2023 certain.  Her last menstrual period was normal and lasted for 4 to 5 days.   She was not on the pill at conception.     Based on her LMP, her EDC is 8/3/2024 and her EGA is 26 weeks,2 days. Her menstrual cycles are regular and occur approximately every 28 days and range from 3 to 5 days. The last menses lasted  the usual number of days.          Pregnancy symptoms:    Since her LMP she has experienced urinary frequency, breast tenderness, and nausea.   She has not been vomiting over the last few weeks.  Associated signs and symptoms which she denies: dysuria, discharge, vaginal bleeding.    She states she has gained weight:  Approximately 5 pounds over the last few weeks.    Relevant past pregnancy history:   She has the following pregnancy history:     She has no history of  delivery.    Relevant past medical history:(relevant to this pregnancy): noncontributory.      Her occupation is: [employer].     1. Have you been to the ER, urgent care clinic, or hospitalized since your last visit? N/A NP      2. Have you seen or consulted any other health care providers outside of the [facility] since your last visit? N/A NP      Examination chaperoned by LOLA JENSEN CMA.  
 Neural tube defect (meningomyelocele, spina bifida, anencephaly)?--no.   4.  Congenital heart defect?--no.  5.  Down syndrome?--no.   6.  Yaron-Sachs ([employer], [employer])?--no.   7.  Canavan's Disease?--no.   8.  Familial Dysautonomia?--no.   9.  Sickle cell disease or trait ()?--no   The patient has not been tested for sickle trait  10.  Hemophilia or other blood disorders?--no.   11.  Muscular dystrophy?--no.  12.  Cystic fibrosis?--no.  13.  Deidre's Chorea?--no.  14.  Mental retardation/autism (if yes was person tested for Fragile X)?--no.   15.  Other inherited genetic or chromosomal disorder?--no.   16.  Maternal metabolic disorder (DM, PKU, etc)?--no.  17.  Patient or FOB with a child with a birth defect not listed above?--no.  17a.  Patient or FOB with a birth defect themselves?--no.   18.  Recurrent pregnancy loss, or stillbirth?--no.  19.  Any medications since LMP other than prenatal vitamins (include vitamins, supplements, OTC meds, drugs, alcohol)?--no.  20.  Any other genetic/environmental exposure to discuss?--no.    Infection History:  1.  Lives with someone with TB or TB exposed?--no.   2.  Patient or partner has history of genital herpes?--no.  3.  Rash or viral illness since LMP?--no.    4.  History of STD (GC, CT, HPV, syphilis, HIV)?--no   5. Other: OTHER?      Problem List:  Patient Active Problem List    Diagnosis Date Noted    ASCUS with positive high risk HPV cervical 01/25/2024     Overview Note:     Repap pp      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    Personality disorder in adult (HCC) 08/06/2023    Suicidal behavior without attempted self-injury 08/06/2023    Schizoaffective disorder (HCC) 08/05/2023     Past Medical History:   Diagnosis Date    Abnormal Pap smear of cervix 12/28/2023    ADHD     Anxiety     Bipolar disorder (HCC)     Borderline personality disorder (HCC)     Depression     Headache     Migraines     Multiple

## 2024-05-13 ENCOUNTER — NURSE ONLY (OUTPATIENT)
Age: 24
End: 2024-05-13

## 2024-05-13 ENCOUNTER — ROUTINE PRENATAL (OUTPATIENT)
Age: 24
End: 2024-05-13
Payer: COMMERCIAL

## 2024-05-13 VITALS — BODY MASS INDEX: 33.05 KG/M2 | WEIGHT: 211 LBS | SYSTOLIC BLOOD PRESSURE: 105 MMHG | DIASTOLIC BLOOD PRESSURE: 71 MMHG

## 2024-05-13 DIAGNOSIS — Z3A.28 28 WEEKS GESTATION OF PREGNANCY: ICD-10-CM

## 2024-05-13 DIAGNOSIS — O99.013 ANEMIA AFFECTING PREGNANCY IN THIRD TRIMESTER: Primary | ICD-10-CM

## 2024-05-13 DIAGNOSIS — Z3A.28 28 WEEKS GESTATION OF PREGNANCY: Primary | ICD-10-CM

## 2024-05-13 DIAGNOSIS — Z23 ENCOUNTER FOR IMMUNIZATION: ICD-10-CM

## 2024-05-13 LAB
ERYTHROCYTE [DISTWIDTH] IN BLOOD BY AUTOMATED COUNT: 12.3 % (ref 11.5–14.5)
GLUCOSE 1H P 100 G GLC PO SERPL-MCNC: 95 MG/DL (ref 65–140)
HCT VFR BLD AUTO: 27.1 % (ref 35–47)
HGB BLD-MCNC: 8.7 G/DL (ref 11.5–16)
MCH RBC QN AUTO: 28.2 PG (ref 26–34)
MCHC RBC AUTO-ENTMCNC: 32.1 G/DL (ref 30–36.5)
MCV RBC AUTO: 87.7 FL (ref 80–99)
NRBC # BLD: 0 K/UL (ref 0–0.01)
NRBC BLD-RTO: 0 PER 100 WBC
PLATELET # BLD AUTO: 244 K/UL (ref 150–400)
PMV BLD AUTO: 12 FL (ref 8.9–12.9)
RBC # BLD AUTO: 3.09 M/UL (ref 3.8–5.2)
WBC # BLD AUTO: 10.1 K/UL (ref 3.6–11)

## 2024-05-13 PROCEDURE — 0502F SUBSEQUENT PRENATAL CARE: CPT | Performed by: OBSTETRICS & GYNECOLOGY

## 2024-05-13 PROCEDURE — 90715 TDAP VACCINE 7 YRS/> IM: CPT | Performed by: OBSTETRICS & GYNECOLOGY

## 2024-05-13 PROCEDURE — 90471 IMMUNIZATION ADMIN: CPT | Performed by: OBSTETRICS & GYNECOLOGY

## 2024-05-13 NOTE — PROGRESS NOTES
OB VISIT      Current EGA:   28w1d    Visit Notes:  Doing Well. +Fetal Movement.  No Ucs. No LOF or VB.   For Glucola/CBC today.    US today wnl shows growth 86%  LIMITED OB SCAN A SINGLE VERTEX 28W1D IUP IS SEEN. FETAL CARDIAC MOTION OBSERVED. LIMITED ANATOMY WAS VISUALIZED AND APPEARS WNL. APPROPRIATE FETAL GROWTH IS SEEN. HC > 90% RAN AND PLACENTA APPEAR WITHIN NORMAL LIMITS.     Total weight gain is 13.6 kg (30 lb).   (Total expected for pregnancy is 7 kg (15 lb)-11.5 kg (25 lb))  Last Weight Metrics:      5/13/2024    10:58 AM 4/29/2024     2:26 PM 1/11/2024     2:33 PM 11/11/2023    10:44 PM 10/14/2023     1:42 AM 9/27/2023     1:34 PM 9/3/2023     9:54 PM   Weight Loss Metrics   Height  5' 7\" 5' 7\" 5' 7\" 5' 6\" 5' 8\" 5' 8\"   Weight - Scale 211 lbs 209 lbs 3 oz 180 lbs 185 lbs 3 oz 180 lbs 180 lbs 180 lbs   BMI (Calculated) 0 kg/m2 32.8 kg/m2 28.3 kg/m2 29.1 kg/m2 29.1 kg/m2 27.4 kg/m2 27.4 kg/m2        Vitals:    05/13/24 1058   BP: 105/71   Weight: 95.7 kg (211 lb)        Problem List:  Dating by 8 week US  Transfer from Dr. Genao (26w)  Girl (Normal NIPT)  \"Irys\"  GERD  US 20w4d = 19w6d Growth 16%  US 28w2d = 30w0d Growth 86%    PN Flowsheet Data:  Fundal Height (cm): 29 cm  Prenatal Fetal Information  Fundal Height (cm): 29 cm  Fetal HR: US  Movement: Present Albumin: Negative  Glucose: Negative Comments: US today       Current Outpatient Medications   Medication Instructions    omeprazole (PRILOSEC) 40 mg, Oral, DAILY BEFORE BREAKFAST    Prenatal Vit-Fe Fumarate-FA (PRENATAL VITAMIN PO) Oral        Visit Diagnoses:   Diagnosis Orders   1. 28 weeks gestation of pregnancy  Glucose Tolerance Gestational, 1 Hour    CBC    Tdap, BOOSTRIX, (age 10 yrs+), IM      2. Encounter for immunization  Tdap, BOOSTRIX, (age 10 yrs+), IM        Follow Up:  Return in about 3 weeks (around 6/3/2024) for Routine OB.  For TDAP today  Discussed diet and weight gain.  Sugar drinks.    Jose Patterson MD,

## 2024-05-13 NOTE — PROGRESS NOTES
After obtaining consent, and per orders of Dr. Patterson, injection of TDAP given IM by Ector Yeung CMA. Patient instructed to remain in clinic for 20 minutes afterwards, and to report any adverse reaction to me immediately.

## 2024-05-14 PROBLEM — O99.013 ANEMIA AFFECTING PREGNANCY IN THIRD TRIMESTER: Status: ACTIVE | Noted: 2024-05-14

## 2024-05-14 RX ORDER — UREA 10 %
45 LOTION (ML) TOPICAL 2 TIMES DAILY WITH MEALS
Qty: 60 TABLET | Refills: 4 | Status: SHIPPED | OUTPATIENT
Start: 2024-05-14 | End: 2024-10-11

## 2024-05-23 ENCOUNTER — HOSPITAL ENCOUNTER (OUTPATIENT)
Facility: HOSPITAL | Age: 24
Setting detail: OBSERVATION
Discharge: HOME | End: 2024-05-24
Attending: OBSTETRICS & GYNECOLOGY | Admitting: OBSTETRICS & GYNECOLOGY
Payer: COMMERCIAL

## 2024-05-23 PROBLEM — O99.013 ANEMIA COMPLICATING PREGNANCY IN THIRD TRIMESTER: Status: ACTIVE | Noted: 2024-05-23

## 2024-05-23 PROBLEM — Z3A.29 29 WEEKS GESTATION OF PREGNANCY: Status: ACTIVE | Noted: 2024-05-23

## 2024-05-23 PROBLEM — D50.8 OTHER IRON DEFICIENCY ANEMIAS: Status: ACTIVE | Noted: 2024-05-23

## 2024-05-23 LAB
ALBUMIN SERPL-MCNC: 2.5 G/DL (ref 3.5–5)
ALBUMIN/GLOB SERPL: 0.6 (ref 1.1–2.2)
ALP SERPL-CCNC: 96 U/L (ref 45–117)
ALT SERPL-CCNC: 17 U/L (ref 12–78)
ANION GAP SERPL CALC-SCNC: 8 MMOL/L (ref 5–15)
AST SERPL-CCNC: 14 U/L (ref 15–37)
BILIRUB SERPL-MCNC: 0.3 MG/DL (ref 0.2–1)
BUN SERPL-MCNC: 7 MG/DL (ref 6–20)
BUN/CREAT SERPL: 12 (ref 12–20)
CALCIUM SERPL-MCNC: 8.8 MG/DL (ref 8.5–10.1)
CHLORIDE SERPL-SCNC: 108 MMOL/L (ref 97–108)
CO2 SERPL-SCNC: 21 MMOL/L (ref 21–32)
CREAT SERPL-MCNC: 0.57 MG/DL (ref 0.55–1.02)
ERYTHROCYTE [DISTWIDTH] IN BLOOD BY AUTOMATED COUNT: 12.8 % (ref 11.5–14.5)
GLOBULIN SER CALC-MCNC: 4.3 G/DL (ref 2–4)
GLUCOSE SERPL-MCNC: 119 MG/DL (ref 65–100)
HCT VFR BLD AUTO: 25.5 % (ref 35–47)
HGB BLD-MCNC: 8 G/DL (ref 11.5–16)
MCH RBC QN AUTO: 26.7 PG (ref 26–34)
MCHC RBC AUTO-ENTMCNC: 31.4 G/DL (ref 30–36.5)
MCV RBC AUTO: 85 FL (ref 80–99)
NRBC # BLD: 0 K/UL (ref 0–0.01)
NRBC BLD-RTO: 0 PER 100 WBC
PLATELET # BLD AUTO: 231 K/UL (ref 150–400)
PMV BLD AUTO: 11.6 FL (ref 8.9–12.9)
POTASSIUM SERPL-SCNC: 3.4 MMOL/L (ref 3.5–5.1)
PROT SERPL-MCNC: 6.8 G/DL (ref 6.4–8.2)
RBC # BLD AUTO: 3 M/UL (ref 3.8–5.2)
RETICS # AUTO: 0.08 M/UL (ref 0.02–0.08)
RETICS/RBC NFR AUTO: 2.5 % (ref 0.7–2.1)
SODIUM SERPL-SCNC: 137 MMOL/L (ref 136–145)
WBC # BLD AUTO: 8 K/UL (ref 3.6–11)

## 2024-05-23 PROCEDURE — 83550 IRON BINDING TEST: CPT

## 2024-05-23 PROCEDURE — 82728 ASSAY OF FERRITIN: CPT

## 2024-05-23 PROCEDURE — 85027 COMPLETE CBC AUTOMATED: CPT

## 2024-05-23 PROCEDURE — 85045 AUTOMATED RETICULOCYTE COUNT: CPT

## 2024-05-23 PROCEDURE — 83540 ASSAY OF IRON: CPT

## 2024-05-23 PROCEDURE — G0378 HOSPITAL OBSERVATION PER HR: HCPCS

## 2024-05-23 PROCEDURE — 84443 ASSAY THYROID STIM HORMONE: CPT

## 2024-05-23 PROCEDURE — 80053 COMPREHEN METABOLIC PANEL: CPT

## 2024-05-23 PROCEDURE — 82607 VITAMIN B-12: CPT

## 2024-05-23 PROCEDURE — 36415 COLL VENOUS BLD VENIPUNCTURE: CPT

## 2024-05-23 PROCEDURE — G0379 DIRECT REFER HOSPITAL OBSERV: HCPCS

## 2024-05-23 PROCEDURE — 82746 ASSAY OF FOLIC ACID SERUM: CPT

## 2024-05-23 RX ORDER — ONDANSETRON 2 MG/ML
4 INJECTION INTRAMUSCULAR; INTRAVENOUS EVERY 6 HOURS PRN
Status: DISCONTINUED | OUTPATIENT
Start: 2024-05-23 | End: 2024-05-24 | Stop reason: HOSPADM

## 2024-05-23 RX ORDER — ONDANSETRON 4 MG/1
4 TABLET, ORALLY DISINTEGRATING ORAL EVERY 8 HOURS PRN
Status: DISCONTINUED | OUTPATIENT
Start: 2024-05-23 | End: 2024-05-24 | Stop reason: HOSPADM

## 2024-05-23 RX ORDER — DIPHENHYDRAMINE HCL 50 MG
50 CAPSULE ORAL
COMMUNITY

## 2024-05-23 RX ORDER — SODIUM CHLORIDE, SODIUM LACTATE, POTASSIUM CHLORIDE, AND CALCIUM CHLORIDE .6; .31; .03; .02 G/100ML; G/100ML; G/100ML; G/100ML
1000 INJECTION, SOLUTION INTRAVENOUS ONCE
Status: DISCONTINUED | OUTPATIENT
Start: 2024-05-23 | End: 2024-05-24 | Stop reason: HOSPADM

## 2024-05-23 RX ORDER — ACETAMINOPHEN 500 MG
1000 TABLET ORAL EVERY 8 HOURS PRN
Status: DISCONTINUED | OUTPATIENT
Start: 2024-05-23 | End: 2024-05-24 | Stop reason: HOSPADM

## 2024-05-23 RX ADMIN — SODIUM CHLORIDE, SODIUM LACTATE, POTASSIUM CHLORIDE, AND CALCIUM CHLORIDE 1000 ML: .6; .31; .03; .02 INJECTION, SOLUTION INTRAVENOUS at 22:37

## 2024-05-24 ENCOUNTER — TELEPHONE (OUTPATIENT)
Age: 24
End: 2024-05-24

## 2024-05-24 LAB
FERRITIN SERPL-MCNC: 3 NG/ML (ref 26–388)
FOLATE SERPL-MCNC: 17.5 NG/ML (ref 5–21)
IRON SATN MFR SERPL: 6 % (ref 20–50)
IRON SERPL-MCNC: 32 UG/DL (ref 35–150)
TIBC SERPL-MCNC: 559 UG/DL (ref 250–450)
VIT B12 SERPL-MCNC: 260 PG/ML (ref 193–986)

## 2024-05-24 NOTE — TELEPHONE ENCOUNTER
PT call returned name and  verified    Relayed  put referral in for Hematology and  recommended Dr. Daniela Ahuja and contact information was given to PT.  PT verbalizes understanding.

## 2024-05-24 NOTE — TELEPHONE ENCOUNTER
Patient called and stated that she was told to make an appt with our office. A referral is in the system although it is not directed to our office. Patient is 30 wks tomorrow. I informed patient that I would put in a message to the referral coordinator and have her call her back when she has a good day and time.

## 2024-05-24 NOTE — PROGRESS NOTES
2052 Pt arrives with complaints of HA, visual blurriness, lightheadedness, nausea and abdominal pain for 2-3 days. Pt states she feels like she almost blacks out. Pt has not taken any pain medication

## 2024-05-24 NOTE — PROGRESS NOTES
2141: Dr. Edwards bedside  for patient evaluation. MD reviewed FHR and he stated that patient is OK to be off of monitor now. MD stated he is going to put in lab orders and to administer 1L of IV fluids.     2225: Meal provided to patient. Patient completed eating meal prior to beginning IV fluids and blood draw.    2255: Patient expressed she has been occasionally feeling \"tingling\" in her chest. She stated that she has been feeling this during her second trimester. Dr. Edwards updated regarding this symptom, no new orders at this time.    2238: Dr. Edwards bedside to discuss current lab results with patient. OB recommended patient to make an appointment with hematology to discuss IV iron transfusions. Patient stated her understanding and is comfortable going home at this time and make a follow up visit with her OB and hematology.     0011: Sat down with patient to discuss discharge instructions. Patient stated her understanding with discharge education and is comfortable going home at this time. Patient aware to call/ return to hospital for any concerns or worsening of symptoms.

## 2024-05-24 NOTE — H&P
History & Physical    Name: Destiny R Saint MRN: [id number]  SSN: [id number]    YOB: 2000  Age: 23 y.o.  Sex: adult        Subjective:   Chief Complaint: Headache  Estimated Date of Delivery: 24  OB History    Para Term  AB Living   1 0 0 0 0 0   SAB IAB Ectopic Molar Multiple Live Births   0 0 0 0 0 0      # Outcome Date GA Lbr Dennis/2nd Weight Sex Delivery Anes PTL Lv   1 Current                Destiny R Saint, 23 y.o.,  ,  presents at 29w4d, complaining of Headache.  She presents with multiple complaints, including headaches, some visual changes, feeling faint, weak, nauseous, and tired.  She has a history of anemia, but never went to get her anemia labs drawn.     Prenatal course was complicated by  anemia . Please see prenatal records for details.    Allergies   Allergen Reactions    Other Itching     Turkey causes throat itching     Adhesive Tape Other (See Comments)     3M clear medical tape    Nsaids Other (See Comments)     gastritis       Prior to Admission medications    Medication Sig Start Date End Date Taking? Authorizing Provider   diphenhydrAMINE (BENADRYL) 50 MG capsule Take 1 capsule by mouth nightly as needed for Sleep or Allergies   Yes ProviderAdalgisa MD   ferrous sulfate ER (SLOW FE) 45 MG TBCR extended release tablet Take 1 tablet by mouth 2 times daily (with meals) 5/14/24 10/11/24  Jose Patterson II, MD   Prenatal Vit-Fe Fumarate-FA (PRENATAL VITAMIN PO) Take by mouth    ProviderAdalgisa MD   omeprazole (PRILOSEC) 40 MG delayed release capsule Take 1 capsule by mouth every morning (before breakfast)  Patient not taking: Reported on 2024   Denis Crain, APRN - NP       Past Medical History:   Diagnosis Date    Abnormal Pap smear of cervix 2023    ADHD     Anemia     Anxiety     Bipolar disorder (HCC)     Borderline personality disorder (HCC)     Depression     Headache     Migraines     Multiple personality disorder

## 2024-05-24 NOTE — TELEPHONE ENCOUNTER
PT name and  verified    22 yo last ov 24, next ov 6/3/24  , 29w5d    PT calling stating she went to the ED last night as she was experiencing a migraine, light headedness, nausea and vision problems that PT is relating to her anemia. ED did labs and IV fluids. PT is wanting MD to review labs and ED visit and is asking if MD wants her to see a hematologist, and if so place referral.  PT denies any issues with the pregnancy at this time.    Please review labs and advise  Thank you

## 2024-05-25 LAB — TSH SERPL DL<=0.05 MIU/L-ACNC: 2.27 UIU/ML (ref 0.45–4.5)

## 2024-05-28 ENCOUNTER — OFFICE VISIT (OUTPATIENT)
Age: 24
End: 2024-05-28

## 2024-05-28 VITALS
OXYGEN SATURATION: 95 % | RESPIRATION RATE: 16 BRPM | TEMPERATURE: 97.3 F | HEART RATE: 84 BPM | BODY MASS INDEX: 33.65 KG/M2 | SYSTOLIC BLOOD PRESSURE: 110 MMHG | HEIGHT: 67 IN | WEIGHT: 214.4 LBS | DIASTOLIC BLOOD PRESSURE: 62 MMHG

## 2024-05-28 DIAGNOSIS — Z3A.30 30 WEEKS GESTATION OF PREGNANCY: ICD-10-CM

## 2024-05-28 DIAGNOSIS — D50.8 OTHER IRON DEFICIENCY ANEMIAS: ICD-10-CM

## 2024-05-28 DIAGNOSIS — O99.013 ANEMIA AFFECTING PREGNANCY IN THIRD TRIMESTER: Primary | ICD-10-CM

## 2024-05-28 ASSESSMENT — PATIENT HEALTH QUESTIONNAIRE - PHQ9
SUM OF ALL RESPONSES TO PHQ QUESTIONS 1-9: 2
SUM OF ALL RESPONSES TO PHQ9 QUESTIONS 1 & 2: 2
2. FEELING DOWN, DEPRESSED OR HOPELESS: SEVERAL DAYS
1. LITTLE INTEREST OR PLEASURE IN DOING THINGS: SEVERAL DAYS

## 2024-05-30 NOTE — PROGRESS NOTES
Chief Complaint   Patient presents with    New Patient           Vitals:    05/28/24 1422   BP: 110/62   Pulse: 84   Resp: 16   Temp: 97.3 °F (36.3 °C)   SpO2: 95%            1. Have you been to the ER, urgent care clinic since your last visit?  Hospitalized since your last visit?  New Patient  2. Have you seen or consulted any other health care providers outside of the [facility] since your last visit?  Include any pap smears or colon screening. New Patient    
05/23/2024 10:30 PM    K 3.4 05/23/2024 10:30 PM     05/23/2024 10:30 PM    CO2 21 05/23/2024 10:30 PM    GLUCOSE 119 05/23/2024 10:30 PM    BUN 7 05/23/2024 10:30 PM    CREATININE 0.57 05/23/2024 10:30 PM    CALCIUM 8.8 05/23/2024 10:30 PM     Lab Results   Component Value Date/Time    BILITOT 0.3 05/23/2024 10:30 PM    ALT 17 05/23/2024 10:30 PM    AST 14 05/23/2024 10:30 PM    ALKPHOS 96 05/23/2024 10:30 PM    ALKPHOS 65 03/23/2023 06:57 PM    GLOB 4.3 05/23/2024 10:30 PM     Lab Results   Component Value Date    RETICAUTO 2.5 (H) 05/23/2024    IRON 32 (L) 05/23/2024    TIBC 559 (H) 05/23/2024    IRONPERSAT 6 (L) 05/23/2024    FERRITIN 3 (L) 05/23/2024    LPJDKNBJ33 260 05/23/2024    FOLATE 17.5 05/23/2024     Lab Results   Component Value Date    LIPASE 27 11/11/2023    TSH 2.270 05/23/2024    OTS1JZD 2.29 09/27/2023       Assessment & Plan:   Destiny R Saint is a 23 y.o. adult who present with anemia in pregnancy    1. Iron deficiency anemia, severe  Recent labs confirm severe iron deficiency related to pregnancy given normal Hgb prior to pregnancy.     Destiny R Saint has been taking oral iron with insufficient response, therefore, I recommend parenteral iron administration. We will set Destiny R Saint up for therapy in the [facility] with venofer 200mg IV weekly x5 doses.  We discussed the potential risks of therapy, including allergic reaction, and the patient has consented to treatment. Plan to repeat CBC, iron profile and ferritin in the future to ensure an appropriate response.    2. Pregnancy, third trimester  Progressing normally with the exception of anemia. CRISTHIAN 8/4/24    3. Mental health  Patient seems to be coping well off her medications at this time. I recommend follow up with PCP or psych following delivery to discuss when it is safe to resume her medications.     -- Venofer 200mg IV weekly x 5 doses  -- Return to clinic in about 3 months with CBC, iron profile and ferritin prior      I appreciate

## 2024-05-31 ENCOUNTER — TELEPHONE (OUTPATIENT)
Age: 24
End: 2024-05-31

## 2024-05-31 RX ORDER — DIPHENHYDRAMINE HYDROCHLORIDE 50 MG/ML
50 INJECTION INTRAMUSCULAR; INTRAVENOUS
OUTPATIENT
Start: 2024-05-31

## 2024-05-31 RX ORDER — SODIUM CHLORIDE 9 MG/ML
5-250 INJECTION, SOLUTION INTRAVENOUS PRN
OUTPATIENT
Start: 2024-05-31

## 2024-05-31 RX ORDER — ACETAMINOPHEN 325 MG/1
650 TABLET ORAL
OUTPATIENT
Start: 2024-05-31

## 2024-05-31 RX ORDER — EPINEPHRINE 1 MG/ML
0.3 INJECTION, SOLUTION, CONCENTRATE INTRAVENOUS PRN
OUTPATIENT
Start: 2024-05-31

## 2024-05-31 RX ORDER — ONDANSETRON 2 MG/ML
8 INJECTION INTRAMUSCULAR; INTRAVENOUS
OUTPATIENT
Start: 2024-05-31

## 2024-05-31 RX ORDER — ALBUTEROL SULFATE 90 UG/1
4 AEROSOL, METERED RESPIRATORY (INHALATION) PRN
OUTPATIENT
Start: 2024-05-31

## 2024-05-31 RX ORDER — SODIUM CHLORIDE 0.9 % (FLUSH) 0.9 %
5-40 SYRINGE (ML) INJECTION PRN
OUTPATIENT
Start: 2024-05-31

## 2024-05-31 RX ORDER — SODIUM CHLORIDE 9 MG/ML
INJECTION, SOLUTION INTRAVENOUS CONTINUOUS
OUTPATIENT
Start: 2024-05-31

## 2024-05-31 RX ORDER — HEPARIN 100 UNIT/ML
500 SYRINGE INTRAVENOUS PRN
OUTPATIENT
Start: 2024-05-31

## 2024-05-31 NOTE — TELEPHONE ENCOUNTER
Patient called and stated that she needed iron. Got her all set up and have her added for Vicky 10

## 2024-06-03 ENCOUNTER — ROUTINE PRENATAL (OUTPATIENT)
Age: 24
End: 2024-06-03

## 2024-06-03 VITALS — WEIGHT: 213 LBS | BODY MASS INDEX: 33.36 KG/M2 | DIASTOLIC BLOOD PRESSURE: 66 MMHG | SYSTOLIC BLOOD PRESSURE: 101 MMHG

## 2024-06-03 DIAGNOSIS — Z3A.31 31 WEEKS GESTATION OF PREGNANCY: Primary | ICD-10-CM

## 2024-06-03 PROCEDURE — 0502F SUBSEQUENT PRENATAL CARE: CPT | Performed by: OBSTETRICS & GYNECOLOGY

## 2024-06-03 NOTE — PROGRESS NOTES
OB VISIT      Current EGA:   31w1d    Visit Notes:  Doing Well. +Fetal Movement.  No Ucs. No LOF or VB.     Total weight gain is 14.5 kg (32 lb).   (Total expected for pregnancy is 7 kg (15 lb)-11.5 kg (25 lb))  Last Weight Metrics:      6/3/2024    11:12 AM 5/28/2024     2:22 PM 5/13/2024    10:58 AM 4/29/2024     2:26 PM 1/11/2024     2:33 PM 11/11/2023    10:44 PM 10/14/2023     1:42 AM   Weight Loss Metrics   Height  5' 7\"  5' 7\" 5' 7\" 5' 7\" 5' 6\"   Weight - Scale 213 lbs 214 lbs 6 oz 211 lbs 209 lbs 3 oz 180 lbs 185 lbs 3 oz 180 lbs   BMI (Calculated) 0 kg/m2 33.6 kg/m2 0 kg/m2 32.8 kg/m2 28.3 kg/m2 29.1 kg/m2 29.1 kg/m2        Vitals:    06/03/24 1112   BP: 101/66   Weight: 96.6 kg (213 lb)        Problem List:  Dating by 8 week US  Transfer from Dr. Genao (26w)  Girl (Normal NIPT)  \"Irys\"  GERD  US 20w4d = 19w6d Growth 16%  US 28w2d = 30w0d Growth 86%  Anemia @28w 8.7  seeing hematology.  IV Iron infusions    PN Flowsheet Data:  Fundal Height (cm): 31 cm  Prenatal Fetal Information  Fundal Height (cm): 31 cm  Fetal HR: 145  Movement: Present   Comments: will see hematologist 6/10/2024 to start infusions for anemia already voided       Current Outpatient Medications   Medication Instructions    diphenhydrAMINE (BENADRYL) 50 mg, Oral, BEDTIME PRN    ferrous sulfate ER (SLOW FE) 45 mg, Oral, 2 TIMES DAILY WITH MEALS    Prenatal Vit-Fe Fumarate-FA (PRENATAL VITAMIN PO) Oral        Visit Diagnoses:   Diagnosis Orders   1. 31 weeks gestation of pregnancy            Follow Up:  Return in about 2 weeks (around 6/17/2024) for Routine OB.    Jose Patterson MD, FACOG  06/03/24  11:19 AM

## 2024-06-10 ENCOUNTER — HOSPITAL ENCOUNTER (OUTPATIENT)
Facility: HOSPITAL | Age: 24
Setting detail: INFUSION SERIES
Discharge: HOME | End: 2024-06-10
Payer: COMMERCIAL

## 2024-06-10 VITALS
SYSTOLIC BLOOD PRESSURE: 104 MMHG | RESPIRATION RATE: 18 BRPM | HEART RATE: 71 BPM | HEIGHT: 67 IN | OXYGEN SATURATION: 96 % | DIASTOLIC BLOOD PRESSURE: 68 MMHG | WEIGHT: 213 LBS | BODY MASS INDEX: 33.43 KG/M2 | TEMPERATURE: 98.1 F

## 2024-06-10 DIAGNOSIS — D50.8 OTHER IRON DEFICIENCY ANEMIAS: ICD-10-CM

## 2024-06-10 DIAGNOSIS — O99.013 ANEMIA AFFECTING PREGNANCY IN THIRD TRIMESTER: Primary | ICD-10-CM

## 2024-06-10 PROCEDURE — 6360000002 HC RX W HCPCS: Performed by: NURSE PRACTITIONER

## 2024-06-10 PROCEDURE — 2580000003 HC RX 258: Performed by: NURSE PRACTITIONER

## 2024-06-10 PROCEDURE — 96365 THER/PROPH/DIAG IV INF INIT: CPT

## 2024-06-10 RX ORDER — ONDANSETRON 2 MG/ML
8 INJECTION INTRAMUSCULAR; INTRAVENOUS
OUTPATIENT
Start: 2024-06-17

## 2024-06-10 RX ORDER — SODIUM CHLORIDE 9 MG/ML
INJECTION, SOLUTION INTRAVENOUS CONTINUOUS
OUTPATIENT
Start: 2024-06-17

## 2024-06-10 RX ORDER — SODIUM CHLORIDE 9 MG/ML
5-250 INJECTION, SOLUTION INTRAVENOUS PRN
OUTPATIENT
Start: 2024-06-17

## 2024-06-10 RX ORDER — ALBUTEROL SULFATE 90 UG/1
4 AEROSOL, METERED RESPIRATORY (INHALATION) PRN
OUTPATIENT
Start: 2024-06-17

## 2024-06-10 RX ORDER — SODIUM CHLORIDE 0.9 % (FLUSH) 0.9 %
5-40 SYRINGE (ML) INJECTION PRN
OUTPATIENT
Start: 2024-06-17

## 2024-06-10 RX ORDER — EPINEPHRINE 1 MG/ML
0.3 INJECTION, SOLUTION INTRAMUSCULAR; SUBCUTANEOUS PRN
OUTPATIENT
Start: 2024-06-17

## 2024-06-10 RX ORDER — ACETAMINOPHEN 325 MG/1
650 TABLET ORAL
OUTPATIENT
Start: 2024-06-17

## 2024-06-10 RX ORDER — SODIUM CHLORIDE 9 MG/ML
5-250 INJECTION, SOLUTION INTRAVENOUS PRN
Status: DISCONTINUED | OUTPATIENT
Start: 2024-06-10 | End: 2024-06-11 | Stop reason: HOSPADM

## 2024-06-10 RX ORDER — DIPHENHYDRAMINE HYDROCHLORIDE 50 MG/ML
50 INJECTION INTRAMUSCULAR; INTRAVENOUS
OUTPATIENT
Start: 2024-06-17

## 2024-06-10 RX ORDER — FAMOTIDINE 10 MG/ML
20 INJECTION, SOLUTION INTRAVENOUS
OUTPATIENT
Start: 2024-06-17

## 2024-06-10 RX ORDER — HEPARIN 100 UNIT/ML
500 SYRINGE INTRAVENOUS PRN
OUTPATIENT
Start: 2024-06-17

## 2024-06-10 RX ADMIN — SODIUM CHLORIDE 25 ML/HR: 9 INJECTION, SOLUTION INTRAVENOUS at 15:59

## 2024-06-10 RX ADMIN — SODIUM CHLORIDE 200 MG: 9 INJECTION, SOLUTION INTRAVENOUS at 15:59

## 2024-06-10 ASSESSMENT — PAIN DESCRIPTION - LOCATION: LOCATION: BACK

## 2024-06-10 ASSESSMENT — PAIN SCALES - GENERAL: PAINLEVEL_OUTOF10: 3

## 2024-06-10 ASSESSMENT — PAIN DESCRIPTION - DESCRIPTORS: DESCRIPTORS: ACHING

## 2024-06-10 NOTE — PROGRESS NOTES
[facility] Progress Note    Date: Vicky 10, 2024        1500:   Saint arrived ambulatory and in no distress for Venofer Infusion. Assessment was completed, no acute issues or new complaints at this time. 24g PIV established to right arm without difficulty, positive blood return noted.      Saint's vitals were reviewed.  Patient Vitals for the past 12 hrs:   Temp Pulse Resp BP SpO2   06/10/24 1524 98.1 °F (36.7 °C) 88 18 103/62 96 %         Medications given.  Medications Administered         0.9 % sodium chloride infusion Admin Date  06/10/2024 Action  New Bag Dose  25 mL/hr Rate  25 mL/hr Route  IntraVENous Administered By  Marcella Browne RN        iron sucrose (VENOFER) 200 mg in sodium chloride 0.9 % 100 mL IVPB Admin Date  06/10/2024 Action  New Bag Dose  200 mg Rate  240 mL/hr Route  IntraVENous Administered By  Marcella Browne RN            Patient tolerated treatment well and was discharged from [facility] in stable condition.  PIV flushed and removed. Patient is aware of next scheduled [facility] appointment on 06/17/24.      Marcella Browne RN  Vicky 10, 2024

## 2024-06-17 ENCOUNTER — ROUTINE PRENATAL (OUTPATIENT)
Age: 24
End: 2024-06-17

## 2024-06-17 ENCOUNTER — HOSPITAL ENCOUNTER (OUTPATIENT)
Facility: HOSPITAL | Age: 24
Setting detail: INFUSION SERIES
Discharge: HOME | End: 2024-06-17
Payer: COMMERCIAL

## 2024-06-17 VITALS — BODY MASS INDEX: 34.61 KG/M2 | WEIGHT: 221 LBS | SYSTOLIC BLOOD PRESSURE: 107 MMHG | DIASTOLIC BLOOD PRESSURE: 69 MMHG

## 2024-06-17 VITALS
WEIGHT: 219.9 LBS | SYSTOLIC BLOOD PRESSURE: 115 MMHG | BODY MASS INDEX: 34.51 KG/M2 | OXYGEN SATURATION: 97 % | HEIGHT: 67 IN | DIASTOLIC BLOOD PRESSURE: 68 MMHG | HEART RATE: 87 BPM | RESPIRATION RATE: 15 BRPM | TEMPERATURE: 98.2 F

## 2024-06-17 DIAGNOSIS — O99.013 ANEMIA AFFECTING PREGNANCY IN THIRD TRIMESTER: Primary | ICD-10-CM

## 2024-06-17 DIAGNOSIS — Z3A.33 33 WEEKS GESTATION OF PREGNANCY: Primary | ICD-10-CM

## 2024-06-17 DIAGNOSIS — D50.8 OTHER IRON DEFICIENCY ANEMIAS: ICD-10-CM

## 2024-06-17 PROCEDURE — 6360000002 HC RX W HCPCS: Performed by: NURSE PRACTITIONER

## 2024-06-17 PROCEDURE — 2580000003 HC RX 258: Performed by: NURSE PRACTITIONER

## 2024-06-17 PROCEDURE — 96365 THER/PROPH/DIAG IV INF INIT: CPT

## 2024-06-17 PROCEDURE — 0502F SUBSEQUENT PRENATAL CARE: CPT | Performed by: OBSTETRICS & GYNECOLOGY

## 2024-06-17 RX ORDER — HEPARIN 100 UNIT/ML
500 SYRINGE INTRAVENOUS PRN
Status: CANCELLED | OUTPATIENT
Start: 2024-06-24

## 2024-06-17 RX ORDER — ONDANSETRON 2 MG/ML
8 INJECTION INTRAMUSCULAR; INTRAVENOUS
Status: CANCELLED | OUTPATIENT
Start: 2024-06-24

## 2024-06-17 RX ORDER — SODIUM CHLORIDE 9 MG/ML
5-250 INJECTION, SOLUTION INTRAVENOUS PRN
Status: CANCELLED | OUTPATIENT
Start: 2024-06-24

## 2024-06-17 RX ORDER — SODIUM CHLORIDE 0.9 % (FLUSH) 0.9 %
5-40 SYRINGE (ML) INJECTION PRN
Status: CANCELLED | OUTPATIENT
Start: 2024-06-24

## 2024-06-17 RX ORDER — FAMOTIDINE 10 MG/ML
20 INJECTION, SOLUTION INTRAVENOUS
Status: CANCELLED | OUTPATIENT
Start: 2024-06-24

## 2024-06-17 RX ORDER — ALBUTEROL SULFATE 90 UG/1
4 AEROSOL, METERED RESPIRATORY (INHALATION) PRN
Status: CANCELLED | OUTPATIENT
Start: 2024-06-24

## 2024-06-17 RX ORDER — SODIUM CHLORIDE 9 MG/ML
INJECTION, SOLUTION INTRAVENOUS CONTINUOUS
Status: CANCELLED | OUTPATIENT
Start: 2024-06-24

## 2024-06-17 RX ORDER — SODIUM CHLORIDE 9 MG/ML
5-250 INJECTION, SOLUTION INTRAVENOUS PRN
Status: DISCONTINUED | OUTPATIENT
Start: 2024-06-17 | End: 2024-06-18 | Stop reason: HOSPADM

## 2024-06-17 RX ORDER — ACETAMINOPHEN 325 MG/1
650 TABLET ORAL
Status: CANCELLED | OUTPATIENT
Start: 2024-06-24

## 2024-06-17 RX ORDER — DIPHENHYDRAMINE HYDROCHLORIDE 50 MG/ML
50 INJECTION INTRAMUSCULAR; INTRAVENOUS
Status: CANCELLED | OUTPATIENT
Start: 2024-06-24

## 2024-06-17 RX ORDER — EPINEPHRINE 1 MG/ML
0.3 INJECTION, SOLUTION INTRAMUSCULAR; SUBCUTANEOUS PRN
Status: CANCELLED | OUTPATIENT
Start: 2024-06-24

## 2024-06-17 RX ADMIN — SODIUM CHLORIDE 25 ML/HR: 9 INJECTION, SOLUTION INTRAVENOUS at 15:12

## 2024-06-17 RX ADMIN — SODIUM CHLORIDE 200 MG: 9 INJECTION, SOLUTION INTRAVENOUS at 15:26

## 2024-06-17 ASSESSMENT — PAIN SCALES - GENERAL: PAINLEVEL_OUTOF10: 0

## 2024-06-17 NOTE — PROGRESS NOTES
Outpatient Infusion Center Progress Note        Date: 24    Name: Destiny R Saint    MRN: [id number]         : 2000        Saint admitted to [facility] for Day 2/5 of Venofer Infusion ambulatory in stable condition. Assessment completed, no acute issues at this time. No new concerns voiced.  24 gauge peripheral IV obtained in the LAC without difficulty, line flushed and capped. No labs ordered for this visit.      ** Patient has received this medication in the past. Patient reports no previous reactions to the medication(s).        Vitals:    24 1501 24 1612   BP: 120/70 115/68   Pulse: 93 87   Resp: 16 15   Temp: 98.1 °F (36.7 °C) 98.2 °F (36.8 °C)   TempSrc: Temporal Temporal   SpO2: 96% 97%   Weight: 99.7 kg (219 lb 14.4 oz)    Height: 1.702 m (5' 7\")            Medications:  MEDICATIONS GIVEN:  Medications Administered         0.9 % sodium chloride infusion Admin Date  2024 Action  New Bag Dose  25 mL/hr Rate  25 mL/hr Route  IntraVENous Administered By  Justyn Pelletier RN        iron sucrose (VENOFER) 200 mg in sodium chloride 0.9 % 100 mL IVPB Admin Date  2024 Action  New Bag Dose  200 mg Rate  240 mL/hr Route  IntraVENous Administered By  Justyn Pelletier RN                Post-Infusion Vitals:  Vitals:    24 1612   BP: 115/68   Pulse: 87   Resp: 15   Temp: 98.2 °F (36.8 °C)   SpO2: 97%           Pt tolerated treatment well, no adverse reactions noted. PIV maintained positive blood return throughout treatment, flushed with positive blood return at conclusion and removed and wrapped in coban per protocol. D/c home ambulatory in no distress. Patient is aware of next appointment on 2024 @ 3p at the [facility] location.        Future Appointments:  Future Appointments   Date Time Provider Department Center   2024  3:00 PM  MED INF CHAIR 3 [facility]   2024  2:00 PM Jose Patterson II, MD [facility]   2024  3:00 PM  MED INF CHAIR 1 [facility]

## 2024-06-17 NOTE — PROGRESS NOTES
OB VISIT      Current EGA:   33w1d    Visit Notes:  Doing Well. +Fetal Movement.  No Ucs. No LOF or VB.   Tired    Total weight gain is 18.1 kg (40 lb).   (Total expected for pregnancy is 7 kg (15 lb)-11.5 kg (25 lb))  Last Weight Metrics:      6/17/2024     2:09 PM 6/10/2024     3:24 PM 6/3/2024    11:12 AM 5/28/2024     2:22 PM 5/13/2024    10:58 AM 4/29/2024     2:26 PM 1/11/2024     2:33 PM   Weight Loss Metrics   Height  5' 7\"  5' 7\"  5' 7\" 5' 7\"   Weight - Scale 221 lbs 213 lbs 213 lbs 214 lbs 6 oz 211 lbs 209 lbs 3 oz 180 lbs   BMI (Calculated) 0 kg/m2 33.4 kg/m2 0 kg/m2 33.6 kg/m2 0 kg/m2 32.8 kg/m2 28.3 kg/m2        Vitals:    06/17/24 1409   BP: 107/69   Weight: 100.2 kg (221 lb)        Problem List:  Dating by 8 week US  Transfer from Dr. Genao (26w)  Girl (Normal NIPT)  \"Irys\"  GERD  US 20w4d = 19w6d Growth 16%  US 28w2d = 30w0d Growth 86%  Anemia @28w 8.7  seeing hematology.  IV Iron infusions    PN Flowsheet Data:  Fundal Height (cm): 34 cm  Prenatal Fetal Information  Fundal Height (cm): 34 cm  Fetal HR: 153  Movement: Present   Comments: already voided fatigued       Current Outpatient Medications   Medication Instructions    diphenhydrAMINE (BENADRYL) 50 mg, Oral, BEDTIME PRN    ferrous sulfate ER (SLOW FE) 45 mg, Oral, 2 TIMES DAILY WITH MEALS    Prenatal Vit-Fe Fumarate-FA (PRENATAL VITAMIN PO) Oral        Visit Diagnoses:   Diagnosis Orders   1. 33 weeks gestation of pregnancy            Follow Up:  Return in about 3 weeks (around 7/8/2024) for Routine OB, OB Ultrasound.    Jose Patterson MD, FACOG  06/17/24  2:24 PM

## 2024-06-24 ENCOUNTER — HOSPITAL ENCOUNTER (OUTPATIENT)
Facility: HOSPITAL | Age: 24
Setting detail: INFUSION SERIES
Discharge: HOME | End: 2024-06-24
Payer: COMMERCIAL

## 2024-06-24 VITALS
HEIGHT: 67 IN | TEMPERATURE: 99 F | RESPIRATION RATE: 18 BRPM | HEART RATE: 96 BPM | BODY MASS INDEX: 34.43 KG/M2 | OXYGEN SATURATION: 96 % | DIASTOLIC BLOOD PRESSURE: 57 MMHG | SYSTOLIC BLOOD PRESSURE: 109 MMHG

## 2024-06-24 DIAGNOSIS — O99.013 ANEMIA AFFECTING PREGNANCY IN THIRD TRIMESTER: Primary | ICD-10-CM

## 2024-06-24 DIAGNOSIS — D50.8 OTHER IRON DEFICIENCY ANEMIAS: ICD-10-CM

## 2024-06-24 PROCEDURE — 96365 THER/PROPH/DIAG IV INF INIT: CPT

## 2024-06-24 PROCEDURE — 2580000003 HC RX 258: Performed by: NURSE PRACTITIONER

## 2024-06-24 PROCEDURE — 6360000002 HC RX W HCPCS: Performed by: NURSE PRACTITIONER

## 2024-06-24 RX ORDER — HEPARIN 100 UNIT/ML
500 SYRINGE INTRAVENOUS PRN
Status: CANCELLED | OUTPATIENT
Start: 2024-07-01

## 2024-06-24 RX ORDER — ACETAMINOPHEN 325 MG/1
650 TABLET ORAL
Status: CANCELLED | OUTPATIENT
Start: 2024-07-01

## 2024-06-24 RX ORDER — SODIUM CHLORIDE 9 MG/ML
5-250 INJECTION, SOLUTION INTRAVENOUS PRN
Status: DISCONTINUED | OUTPATIENT
Start: 2024-06-24 | End: 2024-06-25 | Stop reason: HOSPADM

## 2024-06-24 RX ORDER — SODIUM CHLORIDE 0.9 % (FLUSH) 0.9 %
5-40 SYRINGE (ML) INJECTION PRN
Status: CANCELLED | OUTPATIENT
Start: 2024-07-01

## 2024-06-24 RX ORDER — FAMOTIDINE 10 MG/ML
20 INJECTION, SOLUTION INTRAVENOUS
Status: CANCELLED | OUTPATIENT
Start: 2024-07-01

## 2024-06-24 RX ORDER — ONDANSETRON 2 MG/ML
8 INJECTION INTRAMUSCULAR; INTRAVENOUS
Status: CANCELLED | OUTPATIENT
Start: 2024-07-01

## 2024-06-24 RX ORDER — ALBUTEROL SULFATE 90 UG/1
4 AEROSOL, METERED RESPIRATORY (INHALATION) PRN
Status: CANCELLED | OUTPATIENT
Start: 2024-07-01

## 2024-06-24 RX ORDER — DIPHENHYDRAMINE HYDROCHLORIDE 50 MG/ML
50 INJECTION INTRAMUSCULAR; INTRAVENOUS
Status: CANCELLED | OUTPATIENT
Start: 2024-07-01

## 2024-06-24 RX ORDER — SODIUM CHLORIDE 9 MG/ML
5-250 INJECTION, SOLUTION INTRAVENOUS PRN
Status: CANCELLED | OUTPATIENT
Start: 2024-07-01

## 2024-06-24 RX ORDER — SODIUM CHLORIDE 9 MG/ML
INJECTION, SOLUTION INTRAVENOUS CONTINUOUS
Status: CANCELLED | OUTPATIENT
Start: 2024-07-01

## 2024-06-24 RX ORDER — EPINEPHRINE 1 MG/ML
0.3 INJECTION, SOLUTION INTRAMUSCULAR; SUBCUTANEOUS PRN
Status: CANCELLED | OUTPATIENT
Start: 2024-07-01

## 2024-06-24 RX ADMIN — SODIUM CHLORIDE 50 ML/HR: 9 INJECTION, SOLUTION INTRAVENOUS at 15:07

## 2024-06-24 RX ADMIN — SODIUM CHLORIDE 200 MG: 9 INJECTION, SOLUTION INTRAVENOUS at 15:33

## 2024-06-24 ASSESSMENT — PAIN SCALES - GENERAL: PAINLEVEL_OUTOF10: 0

## 2024-06-24 NOTE — PROGRESS NOTES
[facility] Progress Note    Date: 2024    Name: Destiny R Saint    MRN: [id number]         : 2000      Saint Arrived ambulatory and in no distress for Venofer Infusion.  Assessment was completed, no acute issues at this time, no new complaints voiced.  24 G PIV established to Right arm, + blood return.       Saint's vitals were reviewed.  Vitals:    24 1612   BP: (!) 109/57   Pulse:    Resp:    Temp:    SpO2:      Medications:  Medications Administered         0.9 % sodium chloride infusion Admin Date  2024 Action  New Bag Dose  50 mL/hr Rate  50 mL/hr Route  IntraVENous Administered By  Mercedes Rodrigez RN        iron sucrose (VENOFER) 200 mg in sodium chloride 0.9 % 100 mL IVPB Admin Date  2024 Action  New Bag Dose  200 mg Rate  240 mL/hr Route  IntraVENous Administered By  Mercedes Rodrigez RN               Saint tolerated treatment well and [facility] condition.   PIV flushed & removed. Patient is aware of future appointments.    Future Appointments   Date Time Provider Department Center   2024  3:00 PM  MED INF CHAIR 1 [facility]   2024  2:00 PM Jose Patterson II, MD [facility] BS AMB   2024  3:00 PM  MED INF CHAIR 1 [facility]   7/15/2024  2:00 PM Jose Patterson II, MD [facility] BS AMB   2024  2:00 PM Jose Patterson II, MD [facility] BS AMB   2024  2:00 PM Jose Patterson II, MD [facility] BS AMB   2024  2:00 PM Kamryn Crowell APRN - NP [facility] BS AMB       Mercedes Rodrigez RN  2024

## 2024-07-01 ENCOUNTER — HOSPITAL ENCOUNTER (OUTPATIENT)
Facility: HOSPITAL | Age: 24
Setting detail: INFUSION SERIES
Discharge: HOME | End: 2024-07-01
Payer: COMMERCIAL

## 2024-07-01 VITALS
RESPIRATION RATE: 18 BRPM | SYSTOLIC BLOOD PRESSURE: 118 MMHG | BODY MASS INDEX: 34.95 KG/M2 | HEART RATE: 86 BPM | HEIGHT: 67 IN | DIASTOLIC BLOOD PRESSURE: 56 MMHG | OXYGEN SATURATION: 97 % | TEMPERATURE: 98.6 F | WEIGHT: 222.7 LBS

## 2024-07-01 DIAGNOSIS — O99.013 ANEMIA AFFECTING PREGNANCY IN THIRD TRIMESTER: Primary | ICD-10-CM

## 2024-07-01 DIAGNOSIS — D50.8 OTHER IRON DEFICIENCY ANEMIAS: ICD-10-CM

## 2024-07-01 PROCEDURE — 96365 THER/PROPH/DIAG IV INF INIT: CPT

## 2024-07-01 PROCEDURE — 2580000003 HC RX 258: Performed by: NURSE PRACTITIONER

## 2024-07-01 PROCEDURE — 6360000002 HC RX W HCPCS: Performed by: NURSE PRACTITIONER

## 2024-07-01 RX ORDER — SODIUM CHLORIDE 9 MG/ML
5-250 INJECTION, SOLUTION INTRAVENOUS PRN
Status: DISCONTINUED | OUTPATIENT
Start: 2024-07-01 | End: 2024-07-02 | Stop reason: HOSPADM

## 2024-07-01 RX ORDER — ONDANSETRON 2 MG/ML
8 INJECTION INTRAMUSCULAR; INTRAVENOUS
OUTPATIENT
Start: 2024-07-08

## 2024-07-01 RX ORDER — HEPARIN 100 UNIT/ML
500 SYRINGE INTRAVENOUS PRN
OUTPATIENT
Start: 2024-07-08

## 2024-07-01 RX ORDER — SODIUM CHLORIDE 0.9 % (FLUSH) 0.9 %
5-40 SYRINGE (ML) INJECTION PRN
Status: DISCONTINUED | OUTPATIENT
Start: 2024-07-01 | End: 2024-07-02 | Stop reason: HOSPADM

## 2024-07-01 RX ORDER — EPINEPHRINE 1 MG/ML
0.3 INJECTION, SOLUTION INTRAMUSCULAR; SUBCUTANEOUS PRN
OUTPATIENT
Start: 2024-07-08

## 2024-07-01 RX ORDER — SODIUM CHLORIDE 0.9 % (FLUSH) 0.9 %
5-40 SYRINGE (ML) INJECTION PRN
OUTPATIENT
Start: 2024-07-08

## 2024-07-01 RX ORDER — SODIUM CHLORIDE 9 MG/ML
INJECTION, SOLUTION INTRAVENOUS CONTINUOUS
OUTPATIENT
Start: 2024-07-08

## 2024-07-01 RX ORDER — FAMOTIDINE 10 MG/ML
20 INJECTION, SOLUTION INTRAVENOUS
OUTPATIENT
Start: 2024-07-08

## 2024-07-01 RX ORDER — DIPHENHYDRAMINE HYDROCHLORIDE 50 MG/ML
50 INJECTION INTRAMUSCULAR; INTRAVENOUS
OUTPATIENT
Start: 2024-07-08

## 2024-07-01 RX ORDER — SODIUM CHLORIDE 9 MG/ML
5-250 INJECTION, SOLUTION INTRAVENOUS PRN
Status: CANCELLED | OUTPATIENT
Start: 2024-07-08

## 2024-07-01 RX ORDER — ALBUTEROL SULFATE 90 UG/1
4 AEROSOL, METERED RESPIRATORY (INHALATION) PRN
OUTPATIENT
Start: 2024-07-08

## 2024-07-01 RX ORDER — ACETAMINOPHEN 325 MG/1
650 TABLET ORAL
OUTPATIENT
Start: 2024-07-08

## 2024-07-01 RX ORDER — SODIUM CHLORIDE 9 MG/ML
5-250 INJECTION, SOLUTION INTRAVENOUS PRN
OUTPATIENT
Start: 2024-07-08

## 2024-07-01 RX ADMIN — Medication 10 ML: at 15:00

## 2024-07-01 RX ADMIN — SODIUM CHLORIDE 200 MG: 9 INJECTION, SOLUTION INTRAVENOUS at 15:16

## 2024-07-01 RX ADMIN — SODIUM CHLORIDE 25 ML/HR: 9 INJECTION, SOLUTION INTRAVENOUS at 15:16

## 2024-07-01 ASSESSMENT — PAIN SCALES - GENERAL: PAINLEVEL_OUTOF10: 0

## 2024-07-01 NOTE — PROGRESS NOTES
[facility] Progress Note    Date: 2024    Name: Destiny R Saint    MRN: [id number]         : 2000    Destiny Saint arrived ambulatory and in no distress for #4/5 Venofer Infusion.  Assessment was completed, patient has more cramping in the pelvic area since last visit.  24 G PIV established to left arm, + blood return.       Saint's vitals were reviewed.  Vitals:    24 1442 24 1530 24 1558   BP: 130/62 (!) 104/53 (!) 118/56   Pulse: 94 87 86   Resp:    Temp: 98.7 °F (37.1 °C)  98.6 °F (37 °C)   TempSrc: Temporal  Temporal   SpO2: 97%  97%   Weight: 101 kg (222 lb 11.2 oz)     Height: 1.702 m (5' 7\")            Medications:  Medications Administered         0.9 % sodium chloride infusion Admin Date  2024 Action  New Bag Dose  25 mL/hr Rate  25 mL/hr Route  IntraVENous Administered By  Elizabeth Hamilton RN        iron sucrose (VENOFER) 200 mg in sodium chloride 0.9 % 100 mL IVPB Admin Date  2024 Action  New Bag Dose  200 mg Rate  240 mL/hr Route  IntraVENous Administered By  Elizabeth Hamilton RN        sodium chloride flush 0.9 % injection 5-40 mL Admin Date  2024 Action  Given Dose  10 mL Rate   Route  IntraVENous Administered By  Elizabeth Hamilton RN             Irena stated she felt dizzy and had some head \"heaviness\" when infusion was almost complete.  Patient was placed in a reclining position with her head slightly down. Patient stated she had the same symptoms with her last infusion, but it started with the flush at the end of the infusion. Patient tolerated the remainder of the infusion and was kept for observation.  Water was given and patient was discharged with no dizziness.  PIV flushed & removed.   Notified Kamryn Crowell NP in teams of the events.    Destiny R Saint is aware of the next scheduled appointment.      AVS declined    ELIZABETH HAMILTON RN  2024    Future Appointments   Date Time Provider Department Center   2024 12:30  PM SHAD KONG US1 [facility] BS AMB   7/8/2024  1:00 PM Jose Patterson II, MD [facility] BS AMB   7/8/2024  3:00 PM  MED INF CHAIR 1 [facility]   7/15/2024  2:00 PM Jose Patterson II, MD [facility] BS AMB   7/22/2024  2:00 PM Jose Patterson II, MD [facility] BS AMB   7/29/2024  2:00 PM Jose Patterson II, MD [facility] BS AMB   8/28/2024  2:00 PM Kamryn Crowell, APRN - NP [facility] BS AMB

## 2024-07-04 ENCOUNTER — HOSPITAL ENCOUNTER (OUTPATIENT)
Facility: HOSPITAL | Age: 24
Setting detail: OBSERVATION
Discharge: HOME | End: 2024-07-04
Attending: OBSTETRICS & GYNECOLOGY | Admitting: OBSTETRICS & GYNECOLOGY
Payer: COMMERCIAL

## 2024-07-04 VITALS
TEMPERATURE: 97.8 F | OXYGEN SATURATION: 97 % | DIASTOLIC BLOOD PRESSURE: 57 MMHG | SYSTOLIC BLOOD PRESSURE: 114 MMHG | RESPIRATION RATE: 18 BRPM | HEART RATE: 85 BPM

## 2024-07-04 PROBLEM — Z3A.35 35 WEEKS GESTATION OF PREGNANCY: Status: ACTIVE | Noted: 2024-07-04

## 2024-07-04 PROBLEM — O99.891 OTHER SPECIFIED DISEASES AND CONDITIONS COMPLICATING PREGNANCY: Status: ACTIVE | Noted: 2024-07-04
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
## 2024-07-04 PROBLEM — M53.3 COCCYGEAL PAIN: Status: ACTIVE | Noted: 2024-07-04

## 2024-07-04 LAB
ERYTHROCYTE [DISTWIDTH] IN BLOOD BY AUTOMATED COUNT: 17.8 % (ref 11.5–14.5)
HCT VFR BLD AUTO: 30.5 % (ref 35–47)
HGB BLD-MCNC: 9.7 G/DL (ref 11.5–16)
MCH RBC QN AUTO: 27.2 PG (ref 26–34)
MCHC RBC AUTO-ENTMCNC: 31.8 G/DL (ref 30–36.5)
MCV RBC AUTO: 85.4 FL (ref 80–99)
NRBC # BLD: 0 K/UL (ref 0–0.01)
NRBC BLD-RTO: 0 PER 100 WBC
PLATELET # BLD AUTO: 213 K/UL (ref 150–400)
PMV BLD AUTO: 11.2 FL (ref 8.9–12.9)
RBC # BLD AUTO: 3.57 M/UL (ref 3.8–5.2)
WBC # BLD AUTO: 9 K/UL (ref 3.6–11)

## 2024-07-04 PROCEDURE — G0379 DIRECT REFER HOSPITAL OBSERV: HCPCS

## 2024-07-04 PROCEDURE — 85027 COMPLETE CBC AUTOMATED: CPT

## 2024-07-04 PROCEDURE — 6370000000 HC RX 637 (ALT 250 FOR IP): Performed by: OBSTETRICS & GYNECOLOGY

## 2024-07-04 PROCEDURE — G0378 HOSPITAL OBSERVATION PER HR: HCPCS

## 2024-07-04 PROCEDURE — 36415 COLL VENOUS BLD VENIPUNCTURE: CPT

## 2024-07-04 RX ORDER — OXYCODONE HYDROCHLORIDE 5 MG/1
10 TABLET ORAL EVERY 4 HOURS PRN
Status: DISCONTINUED | OUTPATIENT
Start: 2024-07-04 | End: 2024-07-04 | Stop reason: HOSPADM

## 2024-07-04 RX ORDER — OXYCODONE HYDROCHLORIDE 5 MG/1
5 TABLET ORAL EVERY 4 HOURS PRN
Status: DISCONTINUED | OUTPATIENT
Start: 2024-07-04 | End: 2024-07-04 | Stop reason: HOSPADM

## 2024-07-04 RX ORDER — ACETAMINOPHEN 500 MG
1000 TABLET ORAL EVERY 8 HOURS PRN
Status: DISCONTINUED | OUTPATIENT
Start: 2024-07-04 | End: 2024-07-04 | Stop reason: HOSPADM

## 2024-07-04 RX ORDER — ONDANSETRON 4 MG/1
4 TABLET, ORALLY DISINTEGRATING ORAL EVERY 8 HOURS PRN
Status: DISCONTINUED | OUTPATIENT
Start: 2024-07-04 | End: 2024-07-04 | Stop reason: HOSPADM

## 2024-07-04 RX ORDER — ONDANSETRON 2 MG/ML
4 INJECTION INTRAMUSCULAR; INTRAVENOUS EVERY 6 HOURS PRN
Status: DISCONTINUED | OUTPATIENT
Start: 2024-07-04 | End: 2024-07-04 | Stop reason: HOSPADM

## 2024-07-04 RX ADMIN — OXYCODONE HYDROCHLORIDE 5 MG: 5 TABLET ORAL at 03:19

## 2024-07-04 ASSESSMENT — PAIN DESCRIPTION - LOCATION: LOCATION: SACRUM;BACK

## 2024-07-04 ASSESSMENT — PAIN DESCRIPTION - ORIENTATION: ORIENTATION: LOWER

## 2024-07-04 ASSESSMENT — PAIN SCALES - GENERAL: PAINLEVEL_OUTOF10: 6

## 2024-07-04 ASSESSMENT — PAIN DESCRIPTION - DESCRIPTORS: DESCRIPTORS: DISCOMFORT;STABBING

## 2024-07-04 NOTE — H&P
History & Physical    Name: Destiny R Saint MRN: [id number]  SSN: [id number]    YOB: 2000  Age: 24 y.o.  Sex: adult        Subjective:   Chief Complaint:  Coccygeal pain  Estimated Date of Delivery: 24  OB History    Para Term  AB Living   1 0 0 0 0 0   SAB IAB Ectopic Molar Multiple Live Births   0 0 0 0 0 0      # Outcome Date GA Lbr Dennis/2nd Weight Sex Delivery Anes PTL Lv   1 Current                Destiny R Saint, 24 y.o.,  ,  presents at 35w4d, complaining of  coccygeal pain .  She fell into the tub, from the ledge,  when trying to get int he bath.  This happened at midnight.  She is worried about her pregnancy.  She is able to walk with assistance.  She has a history of anemia and has been light headed.  She has been receiving iron transfusions.  Her last hemoglobin was 24, 8.0     Prenatal course was complicated by  anemia . Please see prenatal records for details.    Allergies   Allergen Reactions    Other Itching     [place] causes throat itching     Adhesive Tape Other (See Comments)     3M clear medical tape    Nsaids Other (See Comments)     gastritis       Prior to Admission medications    Medication Sig Start Date End Date Taking? Authorizing Provider   diphenhydrAMINE (BENADRYL) 50 MG capsule Take 1 capsule by mouth nightly as needed for Sleep or Allergies    Adalgisa Lee MD   ferrous sulfate ER (SLOW FE) 45 MG TBCR extended release tablet Take 1 tablet by mouth 2 times daily (with meals) 5/14/24 10/11/24  Jose Patterson II, MD   Prenatal Vit-Fe Fumarate-FA (PRENATAL VITAMIN PO) Take by mouth    ProviderAdalgisa MD       Past Medical History:   Diagnosis Date    Abnormal Pap smear of cervix 2023    ADHD     Anemia     Anxiety     Bipolar disorder (HCC)     Borderline personality disorder (HCC)     Depression     Headache     Migraines     Multiple personality disorder (HCC)     PTSD (post-traumatic stress disorder)     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  Neuro:    Alert. Oriented.  CN 2 - 12 intact.  Motor and sensory exam grossly normal.      Prenatal Labs   No results found for: \"ABORH\", \"RUBELLAEXT\", \"GRBSEXT\", \"HBSAGEXT\", \"HIVEXT\", \"RPREXT\", \"GONNOEXT\"  No results found for this or any previous visit (from the past 12 hour(s)).      Assessment/Plan:     Principal Problem:    Coccygeal pain  Active Problems:    Anemia complicating pregnancy in third trimester    Other iron deficiency anemias    Other specified diseases and conditions complicating pregnancy    Fall in (into) shower or empty bathtub, initial encounter    35 weeks gestation of pregnancy  Resolved Problems:    * No resolved hospital problems. *      I will monitor her for 4 hours for possible abruption  I will send a cbc    I talked to the ER MD, who did not feel that she needed to be seen by him.  We could an Xray of the coccyx, but it  would be of little help, as it would not change her management.      Leonel Edwards MD  7/4/2024

## 2024-07-04 NOTE — PROGRESS NOTES
0700- Bedside shift change report given to Edmond RN (oncoming nurse) by Padmini RN (offgoing nurse). Report included the following information Nurse Handoff Report, Adult Overview, Intake/Output, MAR, and Recent Results.     0720- MD Edwards reviewed EFM strip and gave verbal order that pt can be discharged as long as she is not contraction. Pt is not reporting contractions and monitor is not picking up any contractions at this time. Pt abdomen soft upon palpation.     0728- MD Edwards at bedside to discuss with pt plan for discharge. Pt requesting pain medication to be discharged with.     0740- MD Edwards states verbal order to give patient 1,000 mg of tylenol before leaving, plan discussed with pt, pt states she has tylenol at home and refused tylenol before discharge.     0747- Pt discharged with instructions and/or prescriptions reviewed and verbally understood. Pt ambulatory and stable upon discharge.  Pt educated to follow up at OBGYN appointment scheduled on 7/8. Pt verbally understood discharge education and educated on fall risks and prevention. Pt educated that if she falls again or falls on her stomach to come back to the unit.

## 2024-07-04 NOTE — PROGRESS NOTES
0235: Patient arrived to unit alert and ambulatory with c/o falling in shower around 12 AM. Patient states she was taking a shower with her partner when she slipped and fell on her sacrum and heard and felt a cracking sensation in her coccyx area. Patient states that during the fall her stomach did not hit anything, and reports positive fetal movement since fall. Patient denies LOF and vaginal bleeding at this time.     0245: This RN called MD Edwards to notify of patinet arrival and current status. MD states he will be at patient's bedside shortly.     0251: This RN and MD Edwards at patient's bedside at this time for patient assessment, education, and POC.

## 2024-07-08 ENCOUNTER — ROUTINE PRENATAL (OUTPATIENT)
Age: 24
End: 2024-07-08

## 2024-07-08 ENCOUNTER — HOSPITAL ENCOUNTER (OUTPATIENT)
Facility: HOSPITAL | Age: 24
Setting detail: INFUSION SERIES
Discharge: HOME | End: 2024-07-08
Payer: COMMERCIAL

## 2024-07-08 VITALS
BODY MASS INDEX: 34.86 KG/M2 | HEIGHT: 67 IN | RESPIRATION RATE: 18 BRPM | TEMPERATURE: 98 F | HEART RATE: 82 BPM | OXYGEN SATURATION: 97 % | WEIGHT: 222.1 LBS | DIASTOLIC BLOOD PRESSURE: 64 MMHG | SYSTOLIC BLOOD PRESSURE: 119 MMHG

## 2024-07-08 VITALS — DIASTOLIC BLOOD PRESSURE: 58 MMHG | WEIGHT: 222.4 LBS | SYSTOLIC BLOOD PRESSURE: 92 MMHG | BODY MASS INDEX: 34.83 KG/M2

## 2024-07-08 DIAGNOSIS — O99.013 ANEMIA AFFECTING PREGNANCY IN THIRD TRIMESTER: Primary | ICD-10-CM

## 2024-07-08 DIAGNOSIS — Z3A.35 35 WEEKS GESTATION OF PREGNANCY: Primary | ICD-10-CM

## 2024-07-08 DIAGNOSIS — D50.8 OTHER IRON DEFICIENCY ANEMIAS: ICD-10-CM

## 2024-07-08 LAB — GBS, EXTERNAL RESULT: POSITIVE

## 2024-07-08 PROCEDURE — 6360000002 HC RX W HCPCS: Performed by: NURSE PRACTITIONER

## 2024-07-08 PROCEDURE — 0502F SUBSEQUENT PRENATAL CARE: CPT | Performed by: OBSTETRICS & GYNECOLOGY

## 2024-07-08 PROCEDURE — 2580000003 HC RX 258: Performed by: NURSE PRACTITIONER

## 2024-07-08 PROCEDURE — 96365 THER/PROPH/DIAG IV INF INIT: CPT

## 2024-07-08 RX ORDER — DIPHENHYDRAMINE HYDROCHLORIDE 50 MG/ML
50 INJECTION INTRAMUSCULAR; INTRAVENOUS
OUTPATIENT
Start: 2024-07-08

## 2024-07-08 RX ORDER — ONDANSETRON 2 MG/ML
8 INJECTION INTRAMUSCULAR; INTRAVENOUS
OUTPATIENT
Start: 2024-07-08

## 2024-07-08 RX ORDER — EPINEPHRINE 1 MG/ML
0.3 INJECTION, SOLUTION INTRAMUSCULAR; SUBCUTANEOUS PRN
OUTPATIENT
Start: 2024-07-08

## 2024-07-08 RX ORDER — SODIUM CHLORIDE 9 MG/ML
5-250 INJECTION, SOLUTION INTRAVENOUS PRN
OUTPATIENT
Start: 2024-07-08

## 2024-07-08 RX ORDER — SODIUM CHLORIDE 9 MG/ML
5-250 INJECTION, SOLUTION INTRAVENOUS PRN
Status: DISCONTINUED | OUTPATIENT
Start: 2024-07-08 | End: 2024-07-09 | Stop reason: HOSPADM

## 2024-07-08 RX ORDER — SODIUM CHLORIDE 9 MG/ML
INJECTION, SOLUTION INTRAVENOUS CONTINUOUS
OUTPATIENT
Start: 2024-07-08

## 2024-07-08 RX ORDER — ACETAMINOPHEN 325 MG/1
650 TABLET ORAL
OUTPATIENT
Start: 2024-07-08

## 2024-07-08 RX ORDER — ALBUTEROL SULFATE 90 UG/1
4 AEROSOL, METERED RESPIRATORY (INHALATION) PRN
OUTPATIENT
Start: 2024-07-08

## 2024-07-08 RX ORDER — HEPARIN 100 UNIT/ML
500 SYRINGE INTRAVENOUS PRN
OUTPATIENT
Start: 2024-07-08

## 2024-07-08 RX ORDER — SODIUM CHLORIDE 0.9 % (FLUSH) 0.9 %
5-40 SYRINGE (ML) INJECTION PRN
OUTPATIENT
Start: 2024-07-08

## 2024-07-08 RX ORDER — FAMOTIDINE 10 MG/ML
20 INJECTION, SOLUTION INTRAVENOUS
OUTPATIENT
Start: 2024-07-08

## 2024-07-08 RX ADMIN — SODIUM CHLORIDE 200 MG: 9 INJECTION, SOLUTION INTRAVENOUS at 15:52

## 2024-07-08 RX ADMIN — SODIUM CHLORIDE 25 ML/HR: 9 INJECTION, SOLUTION INTRAVENOUS at 15:49

## 2024-07-08 NOTE — PROGRESS NOTES
LIMITED OB SCAN A SINGLE VERTEX 36W1D IUP IS SEEN. FETAL CARDIAC MOTION OBSERVED. LIMITED ANATOMY WAS VISUALIZED AND APPEARS WNL. FETUS APPEARS LGA. SIZE>DATES. FETUS > 90% RAN AND PLACENTA APPEAR WITHIN NORMAL LIMITS.

## 2024-07-08 NOTE — PROGRESS NOTES
[facility] Progress Note    Date: 2024    Name: Destiny R Saint    MRN: [id number]         : 2000      Saint Arrived ambulatory and in no distress for  Venofer (5/5) Infusion.  Assessment was completed, no acute issues at this time, no new complaints voiced.  24 G PIV established to left arm, + blood return.       Saint's vitals were reviewed.  Vitals:    24 1445 24 1615   BP: 119/72 119/64   Pulse: 98 82   Resp: 18    Temp: 98 °F (36.7 °C)    TempSrc: Temporal    SpO2: 97%    Weight: 100.7 kg (222 lb 1.6 oz)    Height: 1.702 m (5' 7\")         Medications:  Medications Administered         0.9 % sodium chloride infusion Admin Date  2024 Action  New Bag Dose  25 mL/hr Rate  25 mL/hr Route  IntraVENous Administered By  Deborah Mendez RN        iron sucrose (VENOFER) 200 mg in sodium chloride 0.9 % 100 mL IVPB Admin Date  2024 Action  New Bag Dose  200 mg Rate  240 mL/hr Route  IntraVENous Administered By  Deborah Mendez RN               Saint tolerated treatment well and was discharged [facility] in stable condition.   PIV flushed & removed. Destiny R Saint is aware of future appointments.     Deborah Mendez RN  2024  Future Appointments   Date Time Provider Department Center   7/15/2024  2:00 PM Jose Patterson II, MD [facility] BS AMB   2024  2:00 PM Jose Patterson II, MD [facility] BS AMB   2024  2:00 PM Jose Patterson II, MD [facility] BS AMB   2024  2:00 PM Kamryn Crowell APRN - NP [facility] BS AMB

## 2024-07-08 NOTE — PROGRESS NOTES
OB VISIT      Current EGA:   36w1d    Visit Notes:  Doing Well. +Fetal Movement.  Occ Ucs. No LOF or VB.   Went to L&D on 7/4 due to fall onto coccyx.    US done today shows:   Macrosomic baby 90%  = 9'5\" @40w.      Total weight gain is 18.8 kg (41 lb 6.4 oz).   (Total expected for pregnancy is 7 kg (15 lb)-11.5 kg (25 lb))  Last Weight Metrics:      7/8/2024     1:01 PM 7/1/2024     2:42 PM 6/24/2024     2:53 PM 6/17/2024     3:01 PM 6/17/2024     2:09 PM 6/10/2024     3:24 PM 6/3/2024    11:12 AM   Weight Loss Metrics   Height  5' 7\" 5' 7.008\" 5' 7\"  5' 7\"    Weight - Scale 222 lbs 6 oz 222 lbs 11 oz  219 lbs 14 oz 221 lbs 213 lbs 213 lbs   BMI (Calculated) 0 kg/m2 35 kg/m2  34.5 kg/m2 0 kg/m2 33.4 kg/m2 0 kg/m2        Vitals:    07/08/24 1301   BP: (!) 92/58   Weight: 100.9 kg (222 lb 6.4 oz)        Problem List:  Dating by 8 week US  Transfer from Dr. Genao (26w)  Girl (Normal NIPT)  \"Irys\"  GERD  US 20w4d = 19w6d Growth 16%  US 28w2d = 30w0d Growth 86%  Anemia @28w 8.7  seeing hematology.  IV Iron infusions  US 36w1d = 38w4d Growth 90%  Macrosomia @36w       PN Flowsheet Data:  Fundal Height (cm): 38 cm  Prenatal Fetal Information  Fundal Height (cm): 38 cm  Fetal HR: U/S  Movement: Present   Comments: fatigued already voided Dilation (cm): Closed  Effacement: 0  Station: -3     Current Outpatient Medications   Medication Instructions    diphenhydrAMINE (BENADRYL) 50 mg, Oral, BEDTIME PRN    ferrous sulfate ER (SLOW FE) 45 mg, Oral, 2 TIMES DAILY WITH MEALS    Prenatal Vit-Fe Fumarate-FA (PRENATAL VITAMIN PO) Oral        Visit Diagnoses:   Diagnosis Orders   1. 35 weeks gestation of pregnancy  Group B Strep by SHAE          Follow Up:  Return in about 1 week (around 7/15/2024) for Routine OB.    Jose Patterson MD, FACOG  07/08/24  1:11 PM

## 2024-07-10 LAB — GP B STREP DNA SPEC QL NAA+PROBE: POSITIVE

## 2024-07-11 PROBLEM — O99.891 OTHER SPECIFIED DISEASES AND CONDITIONS COMPLICATING PREGNANCY: Status: RESOLVED | Noted: 2024-07-04 | Resolved: 2024-07-11

## 2024-07-11 PROBLEM — Z22.330 GBS CARRIER: Status: ACTIVE | Noted: 2024-07-11

## 2024-07-15 ENCOUNTER — ROUTINE PRENATAL (OUTPATIENT)
Age: 24
End: 2024-07-15

## 2024-07-15 VITALS — BODY MASS INDEX: 35.58 KG/M2 | SYSTOLIC BLOOD PRESSURE: 104 MMHG | WEIGHT: 227.2 LBS | DIASTOLIC BLOOD PRESSURE: 68 MMHG

## 2024-07-15 DIAGNOSIS — Z3A.37 37 WEEKS GESTATION OF PREGNANCY: Primary | ICD-10-CM

## 2024-07-15 PROCEDURE — 0502F SUBSEQUENT PRENATAL CARE: CPT | Performed by: OBSTETRICS & GYNECOLOGY

## 2024-07-15 NOTE — PROGRESS NOTES
OB VISIT      Current EGA:   37w1d    Visit Notes:  Doing Well. +Fetal Movement.  No Ucs. No LOF or VB.     Total weight gain is 21 kg (46 lb 3.2 oz).   (Total expected for pregnancy is 7 kg (15 lb)-11.5 kg (25 lb))  Last Weight Metrics:      7/15/2024     2:04 PM 7/8/2024     2:45 PM 7/8/2024     1:01 PM 7/1/2024     2:42 PM 6/24/2024     2:53 PM 6/17/2024     3:01 PM 6/17/2024     2:09 PM   Weight Loss Metrics   Height  5' 7\"  5' 7\" 5' 7.008\" 5' 7\"    Weight - Scale 227 lbs 3 oz 222 lbs 2 oz 222 lbs 6 oz 222 lbs 11 oz  219 lbs 14 oz 221 lbs   BMI (Calculated) 0 kg/m2 34.9 kg/m2 0 kg/m2 35 kg/m2  34.5 kg/m2 0 kg/m2        Vitals:    07/15/24 1404   BP: 104/68   Weight: 103.1 kg (227 lb 3.2 oz)        Problem List:  Dating by 8 week US  Transfer from Dr. Genao (26w)  Girl (Normal NIPT)  \"Irys\"  GERD  US 20w4d = 19w6d Growth 16%  US 28w2d = 30w0d Growth 86%  Anemia @28w 8.7  seeing hematology.  IV Iron infusions  US 36w1d = 38w4d Growth 90%  Macrosomia @36w  GBS Positive    PN Flowsheet Data:  Fundal Height (cm): 38 cm  Prenatal Fetal Information  Fundal Height (cm): 38 cm  Fetal HR: 152  Movement: Present   Comments: funmi flores already voided Dilation (cm): Closed  Effacement: 60  Station: -3     Current Outpatient Medications   Medication Instructions    diphenhydrAMINE (BENADRYL) 50 mg, Oral, BEDTIME PRN    ferrous sulfate ER (SLOW FE) 45 mg, Oral, 2 TIMES DAILY WITH MEALS    Prenatal Vit-Fe Fumarate-FA (PRENATAL VITAMIN PO) Oral        Visit Diagnoses:   Diagnosis Orders   1. 37 weeks gestation of pregnancy            Follow Up:  Return in about 1 week (around 7/22/2024) for Annual.    Jose Patterson MD, FACOG  07/15/24  2:52 PM

## 2024-07-22 ENCOUNTER — ROUTINE PRENATAL (OUTPATIENT)
Age: 24
End: 2024-07-22

## 2024-07-22 VITALS — SYSTOLIC BLOOD PRESSURE: 102 MMHG | WEIGHT: 230.4 LBS | DIASTOLIC BLOOD PRESSURE: 63 MMHG | BODY MASS INDEX: 36.09 KG/M2

## 2024-07-22 DIAGNOSIS — Z3A.38 38 WEEKS GESTATION OF PREGNANCY: Primary | ICD-10-CM

## 2024-07-22 PROCEDURE — 0502F SUBSEQUENT PRENATAL CARE: CPT | Performed by: OBSTETRICS & GYNECOLOGY

## 2024-07-22 NOTE — PROGRESS NOTES
OB VISIT      Current EGA:   38w1d    Visit Notes:  Doing Well. +Fetal Movement.  No Ucs. No LOF or VB.   Some bhc    Total weight gain is 22.4 kg (49 lb 6.4 oz).   (Total expected for pregnancy is 7 kg (15 lb)-11.5 kg (25 lb))  Last Weight Metrics:      7/22/2024     1:59 PM 7/15/2024     2:04 PM 7/8/2024     2:45 PM 7/8/2024     1:01 PM 7/1/2024     2:42 PM 6/24/2024     2:53 PM 6/17/2024     3:01 PM   Weight Loss Metrics   Height   5' 7\"  5' 7\" 5' 7.008\" 5' 7\"   Weight - Scale 230 lbs 6 oz 227 lbs 3 oz 222 lbs 2 oz 222 lbs 6 oz 222 lbs 11 oz  219 lbs 14 oz   BMI (Calculated) 0 kg/m2 0 kg/m2 34.9 kg/m2 0 kg/m2 35 kg/m2  34.5 kg/m2        Vitals:    07/22/24 1359   BP: 102/63   Weight: 104.5 kg (230 lb 6.4 oz)        Problem List:  Dating by 8 week US  Transfer from Dr. Genao (26w)  Girl (Normal NIPT)  \"Irys\"  GERD  US 20w4d = 19w6d Growth 16%  US 28w2d = 30w0d Growth 86%  Anemia @28w 8.7  seeing hematology.  IV Iron infusions  US 36w1d = 38w4d Growth 90%  Macrosomia @36w  GBS Positive    PN Flowsheet Data:  Fundal Height (cm): 39 cm  Prenatal Fetal Information  Fundal Height (cm): 39 cm  Fetal HR: 145  Movement: Present   Comments: funmi flores already voided Dilation (cm): Closed  Effacement: 60  Station: -3     Current Outpatient Medications   Medication Instructions    diphenhydrAMINE (BENADRYL) 50 mg, Oral, BEDTIME PRN    ferrous sulfate ER (SLOW FE) 45 mg, Oral, 2 TIMES DAILY WITH MEALS    Prenatal Vit-Fe Fumarate-FA (PRENATAL VITAMIN PO) Oral        Visit Diagnoses:   Diagnosis Orders   1. 38 weeks gestation of pregnancy            Follow Up:  Return in about 1 year (around 7/22/2025) for Routine OB.    Jose Patterson MD, FACOG  07/22/24  2:38 PM

## 2024-07-29 ENCOUNTER — ROUTINE PRENATAL (OUTPATIENT)
Age: 24
End: 2024-07-29

## 2024-07-29 VITALS — BODY MASS INDEX: 36.71 KG/M2 | SYSTOLIC BLOOD PRESSURE: 103 MMHG | DIASTOLIC BLOOD PRESSURE: 66 MMHG | WEIGHT: 234.4 LBS

## 2024-07-29 DIAGNOSIS — O99.013 ANEMIA COMPLICATING PREGNANCY IN THIRD TRIMESTER: ICD-10-CM

## 2024-07-29 DIAGNOSIS — O23.43 URINARY TRACT INFECTION IN MOTHER DURING THIRD TRIMESTER OF PREGNANCY: ICD-10-CM

## 2024-07-29 DIAGNOSIS — Z3A.39 39 WEEKS GESTATION OF PREGNANCY: Primary | ICD-10-CM

## 2024-07-29 DIAGNOSIS — Z22.330 GBS CARRIER: ICD-10-CM

## 2024-07-29 DIAGNOSIS — O99.013 ANEMIA AFFECTING PREGNANCY IN THIRD TRIMESTER: ICD-10-CM

## 2024-07-29 PROBLEM — M53.3 COCCYGEAL PAIN: Status: RESOLVED | Noted: 2024-07-04 | Resolved: 2024-07-29
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
## 2024-07-29 PROCEDURE — 0502F SUBSEQUENT PRENATAL CARE: CPT | Performed by: OBSTETRICS & GYNECOLOGY

## 2024-07-29 RX ORDER — CEPHALEXIN 500 MG/1
500 CAPSULE ORAL 2 TIMES DAILY
Qty: 14 CAPSULE | Refills: 0 | Status: SHIPPED | OUTPATIENT
Start: 2024-07-29 | End: 2024-08-05

## 2024-08-01 LAB — BACTERIA UR CULT: ABNORMAL

## 2024-08-02 ENCOUNTER — TELEPHONE (OUTPATIENT)
Age: 24
End: 2024-08-02

## 2024-08-02 NOTE — TELEPHONE ENCOUNTER
PT name and  verified    25 yo last ov 24, next ov 24  , 39w5d      PT calling asking to speak with MD or have MD call her, as she is requesting her Induction instead of an ov.  PT states her partner only has until the 7th off, per his boss, and then after the 8th there is no guarantee he will be able to be there for the birth and or be able to take her to the hospital.  Please advise    Thank you

## 2024-08-02 NOTE — TELEPHONE ENCOUNTER
PT call returned name and  verified    Relayed that there is a wait list for those dates/full next week, but to allow TS to reach back out to L&D on  to see if anyone has delivered and she would be in touch.  PT verbalizes understanding and states she would love to go into labor on her own, but would really like her bf to be there for the birth of their daughter.      RONIT

## 2024-08-05 ENCOUNTER — ROUTINE PRENATAL (OUTPATIENT)
Age: 24
End: 2024-08-05

## 2024-08-05 VITALS — SYSTOLIC BLOOD PRESSURE: 110 MMHG | BODY MASS INDEX: 36.49 KG/M2 | WEIGHT: 233 LBS | DIASTOLIC BLOOD PRESSURE: 71 MMHG

## 2024-08-05 DIAGNOSIS — Z3A.40 40 WEEKS GESTATION OF PREGNANCY: Primary | ICD-10-CM

## 2024-08-05 PROCEDURE — 0502F SUBSEQUENT PRENATAL CARE: CPT | Performed by: OBSTETRICS & GYNECOLOGY

## 2024-08-05 NOTE — PROGRESS NOTES
OB VISIT      Current EGA:   40w1d    Visit Notes:  Doing Well. +Fetal Movement.  Occ Ucs. No LOF or VB.   Really wants to be induced.  L&D has been full.  So has only several days off and they share a car.      Total weight gain is 23.6 kg (52 lb).   (Total expected for pregnancy is 7 kg (15 lb)-11.5 kg (25 lb))  Last Weight Metrics:      8/5/2024     3:49 PM 7/29/2024     2:07 PM 7/22/2024     1:59 PM 7/15/2024     2:04 PM 7/8/2024     2:45 PM 7/8/2024     1:01 PM 7/1/2024     2:42 PM   Weight Loss Metrics   Height     5' 7\"  5' 7\"   Weight - Scale 233 lbs 234 lbs 6 oz 230 lbs 6 oz 227 lbs 3 oz 222 lbs 2 oz 222 lbs 6 oz 222 lbs 11 oz   BMI (Calculated) 0 kg/m2 0 kg/m2 0 kg/m2 0 kg/m2 34.9 kg/m2 0 kg/m2 35 kg/m2        Vitals:    08/05/24 1549   BP: 110/71   Weight: 105.7 kg (233 lb)        Problem List:  Dating by 8 week US  Transfer from Dr. Genao (26w)  Girl (Normal NIPT)  \"Irys\"  GERD  US 20w4d = 19w6d Growth 16%  US 28w2d = 30w0d Growth 86%  Anemia @28w 8.7  seeing hematology.  IV Iron infusions  US 36w1d = 38w4d Growth 90%  Macrosomia @36w  GBS Positive    PN Flowsheet Data:  Fundal Height (cm): 40 cm  Prenatal Fetal Information  Fundal Height (cm): 40 cm  Fetal HR: 155  Movement: Present Albumin: Trace  Glucose: Negative Comments: contractions Dilation (cm): Closed  Effacement: 60  Station: -3     Current Outpatient Medications   Medication Instructions    cephALEXin (KEFLEX) 500 mg, Oral, 2 TIMES DAILY    diphenhydrAMINE (BENADRYL) 50 mg, Oral, BEDTIME PRN    ferrous sulfate ER (SLOW FE) 45 mg, Oral, 2 TIMES DAILY WITH MEALS    Prenatal Vit-Fe Fumarate-FA (PRENATAL VITAMIN PO) Oral        Visit Diagnoses:   Diagnosis Orders   1. 40 weeks gestation of pregnancy             Follow Up:  Return for Induction of Labor.  Posted for 8/8 pm for green bulb & induction 8/9.  Waiting list for 8/6    Jose Patterson MD, FACOG  08/05/24  4:17 PM

## 2024-08-06 ENCOUNTER — HOSPITAL ENCOUNTER (INPATIENT)
Facility: HOSPITAL | Age: 24
LOS: 4 days | Discharge: HOME | End: 2024-08-10
Attending: OBSTETRICS & GYNECOLOGY | Admitting: OBSTETRICS & GYNECOLOGY
Payer: COMMERCIAL

## 2024-08-06 ENCOUNTER — TELEPHONE (OUTPATIENT)
Age: 24
End: 2024-08-06

## 2024-08-06 DIAGNOSIS — R52 POSTPARTUM PAIN: ICD-10-CM

## 2024-08-06 DIAGNOSIS — F41.9 ANXIETY: Primary | ICD-10-CM

## 2024-08-06 PROBLEM — Z3A.40 40 WEEKS GESTATION OF PREGNANCY: Status: ACTIVE | Noted: 2024-08-06

## 2024-08-06 PROBLEM — Z34.90 ENCOUNTER FOR ELECTIVE INDUCTION OF LABOR: Status: ACTIVE | Noted: 2024-08-06

## 2024-08-06 LAB
ALBUMIN SERPL-MCNC: 2.7 G/DL (ref 3.5–5)
ALBUMIN/GLOB SERPL: 0.8 (ref 1.1–2.2)
ALP SERPL-CCNC: 189 U/L (ref 45–117)
ALT SERPL-CCNC: 41 U/L (ref 12–78)
ANION GAP SERPL CALC-SCNC: 7 MMOL/L (ref 5–15)
AST SERPL-CCNC: 28 U/L (ref 15–37)
BILIRUB SERPL-MCNC: 0.5 MG/DL (ref 0.2–1)
BUN SERPL-MCNC: 8 MG/DL (ref 6–20)
BUN/CREAT SERPL: 11 (ref 12–20)
CALCIUM SERPL-MCNC: 9 MG/DL (ref 8.5–10.1)
CHLORIDE SERPL-SCNC: 110 MMOL/L (ref 97–108)
CO2 SERPL-SCNC: 22 MMOL/L (ref 21–32)
CREAT SERPL-MCNC: 0.72 MG/DL (ref 0.55–1.02)
GLOBULIN SER CALC-MCNC: 3.6 G/DL (ref 2–4)
GLUCOSE SERPL-MCNC: 101 MG/DL (ref 65–100)
POTASSIUM SERPL-SCNC: 4 MMOL/L (ref 3.5–5.1)
PROT SERPL-MCNC: 6.3 G/DL (ref 6.4–8.2)
SODIUM SERPL-SCNC: 139 MMOL/L (ref 136–145)

## 2024-08-06 PROCEDURE — 36415 COLL VENOUS BLD VENIPUNCTURE: CPT

## 2024-08-06 PROCEDURE — 7210000100 HC LABOR FEE PER 1 HR: Performed by: STUDENT IN AN ORGANIZED HEALTH CARE EDUCATION/TRAINING PROGRAM

## 2024-08-06 PROCEDURE — 1100000000 HC RM PRIVATE

## 2024-08-06 PROCEDURE — 80053 COMPREHEN METABOLIC PANEL: CPT

## 2024-08-06 PROCEDURE — 6370000000 HC RX 637 (ALT 250 FOR IP): Performed by: OBSTETRICS & GYNECOLOGY

## 2024-08-06 PROCEDURE — 86780 TREPONEMA PALLIDUM: CPT

## 2024-08-06 PROCEDURE — 59200 INSERT CERVICAL DILATOR: CPT | Performed by: STUDENT IN AN ORGANIZED HEALTH CARE EDUCATION/TRAINING PROGRAM

## 2024-08-06 PROCEDURE — 2500000003 HC RX 250 WO HCPCS: Performed by: ADVANCED PRACTICE MIDWIFE

## 2024-08-06 PROCEDURE — 86900 BLOOD TYPING SEROLOGIC ABO: CPT

## 2024-08-06 PROCEDURE — 6360000002 HC RX W HCPCS: Performed by: ADVANCED PRACTICE MIDWIFE

## 2024-08-06 PROCEDURE — 86850 RBC ANTIBODY SCREEN: CPT

## 2024-08-06 PROCEDURE — 86901 BLOOD TYPING SEROLOGIC RH(D): CPT

## 2024-08-06 RX ORDER — ACETAMINOPHEN 325 MG/1
650 TABLET ORAL EVERY 4 HOURS PRN
Status: DISCONTINUED | OUTPATIENT
Start: 2024-08-06 | End: 2024-08-08

## 2024-08-06 RX ORDER — TERBUTALINE SULFATE 1 MG/ML
0.25 INJECTION, SOLUTION SUBCUTANEOUS
Status: ACTIVE | OUTPATIENT
Start: 2024-08-06 | End: 2024-08-07

## 2024-08-06 RX ORDER — DOCUSATE SODIUM 100 MG/1
100 CAPSULE, LIQUID FILLED ORAL 2 TIMES DAILY
Status: DISCONTINUED | OUTPATIENT
Start: 2024-08-06 | End: 2024-08-10 | Stop reason: HOSPADM

## 2024-08-06 RX ORDER — ONDANSETRON 4 MG/1
4 TABLET, ORALLY DISINTEGRATING ORAL EVERY 6 HOURS PRN
Status: DISCONTINUED | OUTPATIENT
Start: 2024-08-06 | End: 2024-08-10 | Stop reason: HOSPADM

## 2024-08-06 RX ORDER — ONDANSETRON 2 MG/ML
4 INJECTION INTRAMUSCULAR; INTRAVENOUS EVERY 6 HOURS PRN
Status: DISCONTINUED | OUTPATIENT
Start: 2024-08-06 | End: 2024-08-10 | Stop reason: HOSPADM

## 2024-08-06 RX ORDER — SODIUM CHLORIDE, SODIUM LACTATE, POTASSIUM CHLORIDE, AND CALCIUM CHLORIDE .6; .31; .03; .02 G/100ML; G/100ML; G/100ML; G/100ML
500 INJECTION, SOLUTION INTRAVENOUS PRN
Status: DISCONTINUED | OUTPATIENT
Start: 2024-08-06 | End: 2024-08-08

## 2024-08-06 RX ORDER — SODIUM CHLORIDE 9 MG/ML
25 INJECTION, SOLUTION INTRAVENOUS PRN
Status: DISCONTINUED | OUTPATIENT
Start: 2024-08-06 | End: 2024-08-08

## 2024-08-06 RX ORDER — DIPHENHYDRAMINE HYDROCHLORIDE 50 MG/ML
50 INJECTION INTRAMUSCULAR; INTRAVENOUS NIGHTLY PRN
Status: DISCONTINUED | OUTPATIENT
Start: 2024-08-06 | End: 2024-08-08

## 2024-08-06 RX ORDER — TRANEXAMIC ACID 10 MG/ML
1000 INJECTION, SOLUTION INTRAVENOUS
Status: ACTIVE | OUTPATIENT
Start: 2024-08-06 | End: 2024-08-07

## 2024-08-06 RX ORDER — SODIUM CHLORIDE 0.9 % (FLUSH) 0.9 %
5-40 SYRINGE (ML) INJECTION PRN
Status: DISCONTINUED | OUTPATIENT
Start: 2024-08-06 | End: 2024-08-08

## 2024-08-06 RX ORDER — PROCHLORPERAZINE EDISYLATE 5 MG/ML
10 INJECTION INTRAMUSCULAR; INTRAVENOUS EVERY 6 HOURS PRN
Status: COMPLETED | OUTPATIENT
Start: 2024-08-06 | End: 2024-08-07

## 2024-08-06 RX ORDER — SODIUM CHLORIDE 0.9 % (FLUSH) 0.9 %
5-40 SYRINGE (ML) INJECTION EVERY 12 HOURS SCHEDULED
Status: DISCONTINUED | OUTPATIENT
Start: 2024-08-06 | End: 2024-08-08

## 2024-08-06 RX ORDER — MULTIVIT-MIN/IRON/FOLIC ACID/K 18-600-40
CAPSULE ORAL
COMMUNITY

## 2024-08-06 RX ORDER — METHYLERGONOVINE MALEATE 0.2 MG/ML
200 INJECTION INTRAVENOUS PRN
Status: DISCONTINUED | OUTPATIENT
Start: 2024-08-06 | End: 2024-08-10 | Stop reason: HOSPADM

## 2024-08-06 RX ORDER — MISOPROSTOL 200 UG/1
400 TABLET ORAL PRN
Status: DISCONTINUED | OUTPATIENT
Start: 2024-08-06 | End: 2024-08-10 | Stop reason: HOSPADM

## 2024-08-06 RX ORDER — CARBOPROST TROMETHAMINE 250 UG/ML
250 INJECTION, SOLUTION INTRAMUSCULAR PRN
Status: DISCONTINUED | OUTPATIENT
Start: 2024-08-06 | End: 2024-08-10 | Stop reason: HOSPADM

## 2024-08-06 RX ORDER — SODIUM CHLORIDE, SODIUM LACTATE, POTASSIUM CHLORIDE, CALCIUM CHLORIDE 600; 310; 30; 20 MG/100ML; MG/100ML; MG/100ML; MG/100ML
INJECTION, SOLUTION INTRAVENOUS CONTINUOUS
Status: DISCONTINUED | OUTPATIENT
Start: 2024-08-06 | End: 2024-08-08

## 2024-08-06 RX ORDER — HYDROMORPHONE HYDROCHLORIDE 1 MG/ML
1 INJECTION, SOLUTION INTRAMUSCULAR; INTRAVENOUS; SUBCUTANEOUS
Status: COMPLETED | OUTPATIENT
Start: 2024-08-06 | End: 2024-08-07

## 2024-08-06 RX ADMIN — Medication 50 MCG: at 20:00

## 2024-08-06 RX ADMIN — PROCHLORPERAZINE EDISYLATE 10 MG: 5 INJECTION INTRAMUSCULAR; INTRAVENOUS at 20:54

## 2024-08-06 RX ADMIN — HYDROMORPHONE HYDROCHLORIDE 1 MG: 1 INJECTION, SOLUTION INTRAMUSCULAR; INTRAVENOUS; SUBCUTANEOUS at 20:48

## 2024-08-06 RX ADMIN — ACETAMINOPHEN 650 MG: 325 TABLET ORAL at 19:31

## 2024-08-06 ASSESSMENT — PAIN SCALES - GENERAL
PAINLEVEL_OUTOF10: 6
PAINLEVEL_OUTOF10: 9

## 2024-08-06 ASSESSMENT — PAIN DESCRIPTION - LOCATION
LOCATION: ABDOMEN
LOCATION: ABDOMEN

## 2024-08-06 ASSESSMENT — PAIN DESCRIPTION - DESCRIPTORS
DESCRIPTORS: CRAMPING;DISCOMFORT;PRESSURE
DESCRIPTORS: CRAMPING;DISCOMFORT

## 2024-08-06 ASSESSMENT — PAIN DESCRIPTION - ORIENTATION
ORIENTATION: LOWER
ORIENTATION: LOWER

## 2024-08-06 NOTE — TELEPHONE ENCOUNTER
Jose Patterson II, MD   to Cindy Lam       8/6/24  1:41 PM  Ok I asked her to call & check.  She is scheduled for Green bulb thu night & IOL Friday      24 year old patient G1 40w2d pregnant .  Patient denies vaginal bleeding, ROM , contractions and is feeling the baby move    Patient is calling back to check about possible induction of labor today    Patient was advised per Dr. Patterson of being scheduled for Thursday night  for green bulb insertion and IOL on Friday and to call the office tomorrow ,to check if any one has delivered    Patient verbalized understanding.

## 2024-08-06 NOTE — H&P
History & Physical    Name: Destiny R Saint MRN: [id number]  SSN: [id number]    YOB: 2000  Age: 24 y.o.  Sex: adult        Subjective:   Estimated Date of Delivery: 8/4/24      Saint is admitted with pregnancy at 40w2d for induction of labor.  She was originally scheduled for 8/8 for green bulb but had to be rescheduled to [facility] due to staffing.  Her induction is indicated for elective reasons.  She has no complaints this afternoon. Fetus active.    Prenatal course was complicated by:  Dating by 8 week US  Transfer from Dr. Genao (26w)  Girl (Normal NIPT)  \"Irys\"  GERD  US 20w4d = 19w6d Growth 16%  US 28w2d = 30w0d Growth 86%  Anemia @28w 8.7  seeing hematology.  IV Iron infusions  US 36w1d = 38w4d Growth 90%  Macrosomia @36w  GBS Positive    Problem List:  Patient Active Problem List    Diagnosis Date Noted    Encounter for elective induction of labor 08/06/2024    40 weeks gestation of pregnancy 08/06/2024    GBS carrier 07/11/2024    Anemia complicating pregnancy in third trimester 05/23/2024    Other iron deficiency anemias 05/23/2024    Anemia affecting pregnancy in third trimester 05/14/2024     Overview Note:     8.7  Slow FE  5/23/24 8.0 -- Iron studies indicate deficiency.  To see hematology  Iron infusion   7/4/24 Hgb 9.7      ASCUS with positive high risk HPV cervical 01/25/2024     Overview Note:     Repap pp      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    Personality disorder in adult (HCC) 08/06/2023    Suicidal behavior without attempted self-injury 08/06/2023    Schizoaffective disorder (HCC) 08/05/2023     Past Medical History:   Diagnosis Date    Abnormal Pap smear of cervix 12/28/2023    ADHD     Anemia     Anxiety     Bipolar disorder (HCC)     Borderline personality disorder (HCC)     Depression     Headache     Migraines     Multiple personality disorder (HCC)     PTSD (post-traumatic stress disorder)     Reactive attachment disorder         Asthma Mother     Diabetes Mother     Allergy (Severe) Sister     Asthma Sister      No current facility-administered medications on file prior to encounter.     Current Outpatient Medications on File Prior to Encounter   Medication Sig Dispense Refill    diphenhydrAMINE (BENADRYL) 50 MG capsule Take 1 capsule by mouth nightly as needed for Sleep or Allergies      ferrous sulfate ER (SLOW FE) 45 MG TBCR extended release tablet Take 1 tablet by mouth 2 times daily (with meals) 60 tablet 4    Prenatal Vit-Fe Fumarate-FA (PRENATAL VITAMIN PO) Take by mouth       Allergies   Allergen Reactions    Other Itching     [place] causes throat itching     Adhesive Tape Other (See Comments)     3M clear medical tape    Nsaids Other (See Comments)     gastritis       Review of Systems:   History obtained from the patient-negative for:  Constitutional: weight loss, fever, night sweats  HEENT: hearing loss, earache, congestion, snoring, sorethroat  CV: chest pain, palpitations, edema  Resp: cough, shortness of breath, wheezing  Breast: breast lumps, nipple discharge, galactorrhea  GI: change in bowel habits, abdominal pain, black or bloody stools  : frequency, dysuria, hematuria, vaginal discharge  MSK: back pain, joint pain, muscle pain  Skin: itching, rash, hives  Neuro: dizziness, headache, confusion, weakness  Psych: anxiety, depression, change in mood  Heme/lymph: bleeding, bruising, pallor      Objective:     Vitals:  There were no vitals filed for this visit.     Physical Exam:  normal, regular rate and rhythm  clear and clear to auscultation bilaterally without crackles or wheezing    Cervix 60/CL/-3/V    Membranes:  Intact  Fetal Heart Rate: Reactive  Baseline: 150 per minute  Variability: moderate  Accelerations: yes  Decelerations: none    Assessment:   Principal Problem:    Encounter for elective induction of labor  Active Problems:    Anemia affecting pregnancy in third trimester    GBS carrier    40 weeks

## 2024-08-06 NOTE — PROGRESS NOTES
1730 PT ambulates onto unit for elective induction. PT reporting +FM, no LOF, VB, VD.     1805 Dr. Patterson at bedside. FB placed at this time, 60/60. Pt tolerated well.    1910 SBAR given to FELICIA Deal RN. Care of PT handed off at this time

## 2024-08-07 ENCOUNTER — ANESTHESIA (OUTPATIENT)
Facility: HOSPITAL | Age: 24
End: 2024-08-07
Payer: COMMERCIAL

## 2024-08-07 ENCOUNTER — ANESTHESIA EVENT (OUTPATIENT)
Facility: HOSPITAL | Age: 24
End: 2024-08-07
Payer: COMMERCIAL

## 2024-08-07 PROBLEM — F10.21 ALCOHOLISM IN RECOVERY (HCC): Status: ACTIVE | Noted: 2019-01-01

## 2024-08-07 LAB
ABO + RH BLD: NORMAL
BLOOD GROUP ANTIBODIES SERPL: NORMAL
ERYTHROCYTE [DISTWIDTH] IN BLOOD BY AUTOMATED COUNT: 17.7 % (ref 11.5–14.5)
HCT VFR BLD AUTO: 34.9 % (ref 35–47)
HGB BLD-MCNC: 11.2 G/DL (ref 11.5–16)
MCH RBC QN AUTO: 27.4 PG (ref 26–34)
MCHC RBC AUTO-ENTMCNC: 32.1 G/DL (ref 30–36.5)
MCV RBC AUTO: 85.3 FL (ref 80–99)
NRBC # BLD: 0 K/UL (ref 0–0.01)
NRBC BLD-RTO: 0 PER 100 WBC
PLATELET # BLD AUTO: 191 K/UL (ref 150–400)
PMV BLD AUTO: 12.1 FL (ref 8.9–12.9)
RBC # BLD AUTO: 4.09 M/UL (ref 3.8–5.2)
SPECIMEN EXP DATE BLD: NORMAL
WBC # BLD AUTO: 11.2 K/UL (ref 3.6–11)

## 2024-08-07 PROCEDURE — 2500000003 HC RX 250 WO HCPCS: Performed by: NURSE ANESTHETIST, CERTIFIED REGISTERED

## 2024-08-07 PROCEDURE — 7210000100 HC LABOR FEE PER 1 HR: Performed by: STUDENT IN AN ORGANIZED HEALTH CARE EDUCATION/TRAINING PROGRAM

## 2024-08-07 PROCEDURE — 1100000000 HC RM PRIVATE

## 2024-08-07 PROCEDURE — 6360000002 HC RX W HCPCS: Performed by: OBSTETRICS & GYNECOLOGY

## 2024-08-07 PROCEDURE — 85027 COMPLETE CBC AUTOMATED: CPT

## 2024-08-07 PROCEDURE — 2580000003 HC RX 258: Performed by: OBSTETRICS & GYNECOLOGY

## 2024-08-07 PROCEDURE — 51702 INSERT TEMP BLADDER CATH: CPT

## 2024-08-07 PROCEDURE — 3700000025 EPIDURAL BLOCK: Performed by: ANESTHESIOLOGY

## 2024-08-07 PROCEDURE — 2580000003 HC RX 258: Performed by: ADVANCED PRACTICE MIDWIFE

## 2024-08-07 PROCEDURE — 6360000002 HC RX W HCPCS: Performed by: ADVANCED PRACTICE MIDWIFE

## 2024-08-07 PROCEDURE — 2500000003 HC RX 250 WO HCPCS: Performed by: ADVANCED PRACTICE MIDWIFE

## 2024-08-07 PROCEDURE — 6360000002 HC RX W HCPCS: Performed by: NURSE ANESTHETIST, CERTIFIED REGISTERED

## 2024-08-07 RX ORDER — BUPIVACAINE HYDROCHLORIDE 2.5 MG/ML
INJECTION, SOLUTION EPIDURAL; INFILTRATION; INTRACAUDAL PRN
Status: DISCONTINUED | OUTPATIENT
Start: 2024-08-07 | End: 2024-08-08 | Stop reason: SDUPTHER

## 2024-08-07 RX ORDER — ONDANSETRON 2 MG/ML
4 INJECTION INTRAMUSCULAR; INTRAVENOUS EVERY 6 HOURS PRN
Status: DISCONTINUED | OUTPATIENT
Start: 2024-08-07 | End: 2024-08-08

## 2024-08-07 RX ORDER — LIDOCAINE HYDROCHLORIDE AND EPINEPHRINE BITARTRATE 20; .01 MG/ML; MG/ML
INJECTION, SOLUTION SUBCUTANEOUS PRN
Status: DISCONTINUED | OUTPATIENT
Start: 2024-08-07 | End: 2024-08-08 | Stop reason: SDUPTHER

## 2024-08-07 RX ORDER — FENTANYL/BUPIVACAINE/NS/PF 2-1250MCG
10 PLASTIC BAG, INJECTION (ML) INJECTION CONTINUOUS
Status: DISCONTINUED | OUTPATIENT
Start: 2024-08-07 | End: 2024-08-08

## 2024-08-07 RX ORDER — NALOXONE HYDROCHLORIDE 0.4 MG/ML
INJECTION, SOLUTION INTRAMUSCULAR; INTRAVENOUS; SUBCUTANEOUS PRN
Status: DISCONTINUED | OUTPATIENT
Start: 2024-08-07 | End: 2024-08-08

## 2024-08-07 RX ORDER — LIDOCAINE HYDROCHLORIDE 10 MG/ML
INJECTION, SOLUTION INFILTRATION; PERINEURAL PRN
Status: DISCONTINUED | OUTPATIENT
Start: 2024-08-07 | End: 2024-08-08 | Stop reason: SDUPTHER

## 2024-08-07 RX ORDER — EPHEDRINE SULFATE/0.9% NACL/PF 25 MG/5 ML
10 SYRINGE (ML) INTRAVENOUS ONCE
Status: DISCONTINUED | OUTPATIENT
Start: 2024-08-07 | End: 2024-08-08

## 2024-08-07 RX ADMIN — BUPIVACAINE HYDROCHLORIDE 7 ML: 2.5 INJECTION, SOLUTION EPIDURAL; INFILTRATION; INTRACAUDAL; PERINEURAL at 12:03

## 2024-08-07 RX ADMIN — SODIUM CHLORIDE, POTASSIUM CHLORIDE, SODIUM LACTATE AND CALCIUM CHLORIDE: 600; 310; 30; 20 INJECTION, SOLUTION INTRAVENOUS at 10:18

## 2024-08-07 RX ADMIN — SODIUM CHLORIDE 2.5 MILLION UNITS: 9 INJECTION, SOLUTION INTRAVENOUS at 12:06

## 2024-08-07 RX ADMIN — SODIUM CHLORIDE, POTASSIUM CHLORIDE, SODIUM LACTATE AND CALCIUM CHLORIDE: 600; 310; 30; 20 INJECTION, SOLUTION INTRAVENOUS at 04:06

## 2024-08-07 RX ADMIN — Medication 10 ML/HR: at 18:47

## 2024-08-07 RX ADMIN — SODIUM CHLORIDE 2.5 MILLION UNITS: 9 INJECTION, SOLUTION INTRAVENOUS at 16:03

## 2024-08-07 RX ADMIN — SODIUM CHLORIDE, POTASSIUM CHLORIDE, SODIUM LACTATE AND CALCIUM CHLORIDE: 600; 310; 30; 20 INJECTION, SOLUTION INTRAVENOUS at 15:29

## 2024-08-07 RX ADMIN — LIDOCAINE HYDROCHLORIDE 3 ML: 10 INJECTION, SOLUTION INFILTRATION; PERINEURAL at 11:42

## 2024-08-07 RX ADMIN — OXYTOCIN 2 MILLI-UNITS/MIN: 10 INJECTION, SOLUTION INTRAMUSCULAR; INTRAVENOUS at 23:05

## 2024-08-07 RX ADMIN — Medication 10 ML/HR: at 12:10

## 2024-08-07 RX ADMIN — SODIUM CHLORIDE 2.5 MILLION UNITS: 9 INJECTION, SOLUTION INTRAVENOUS at 20:13

## 2024-08-07 RX ADMIN — SODIUM CHLORIDE 2.5 MILLION UNITS: 9 INJECTION, SOLUTION INTRAVENOUS at 08:03

## 2024-08-07 RX ADMIN — HYDROMORPHONE HYDROCHLORIDE 1 MG: 1 INJECTION, SOLUTION INTRAMUSCULAR; INTRAVENOUS; SUBCUTANEOUS at 04:21

## 2024-08-07 RX ADMIN — LIDOCAINE HYDROCHLORIDE,EPINEPHRINE BITARTRATE 3 ML: 20; .01 INJECTION, SOLUTION INFILTRATION; PERINEURAL at 11:56

## 2024-08-07 RX ADMIN — PENICILLIN G POTASSIUM 5 MILLION UNITS: 5000000 INJECTION, POWDER, FOR SOLUTION INTRAMUSCULAR; INTRAVENOUS at 04:08

## 2024-08-07 RX ADMIN — SODIUM CHLORIDE, POTASSIUM CHLORIDE, SODIUM LACTATE AND CALCIUM CHLORIDE: 600; 310; 30; 20 INJECTION, SOLUTION INTRAVENOUS at 09:13

## 2024-08-07 RX ADMIN — PROCHLORPERAZINE EDISYLATE 10 MG: 5 INJECTION INTRAMUSCULAR; INTRAVENOUS at 04:12

## 2024-08-07 RX ADMIN — DIPHENHYDRAMINE HYDROCHLORIDE 50 MG: 50 INJECTION INTRAMUSCULAR; INTRAVENOUS at 01:34

## 2024-08-07 ASSESSMENT — PAIN SCALES - GENERAL: PAINLEVEL_OUTOF10: 6

## 2024-08-07 ASSESSMENT — PAIN DESCRIPTION - DESCRIPTORS: DESCRIPTORS: CRAMPING;DISCOMFORT;PRESSURE

## 2024-08-07 ASSESSMENT — PAIN DESCRIPTION - LOCATION: LOCATION: ABDOMEN;BACK

## 2024-08-07 ASSESSMENT — PAIN DESCRIPTION - ORIENTATION: ORIENTATION: LOWER;POSTERIOR

## 2024-08-07 NOTE — PROGRESS NOTES
CNM Labor Progress Note     Patient: Destiny R Saint MRN: [id number]  SSN: [id number]    YOB: 2000  Age: 24 y.o.  Sex: adult        Subjective:   Patient coping well with contractions.       Objective:   Blood pressure (!) 108/55, pulse 75, temperature 97.7 °F (36.5 °C), temperature source Temporal, resp. rate 16, last menstrual period 10/29/2023, SpO2 97 %.    Fetal heart baseline 130, moderate variability, accelerations present, decelerations not present, Uterine contractions q 1-4 minutes, mild  to moderate to palpation, resting tone soft.    Sterile Vaginal Exam: Cervical Cook Cathether still in place.         Assessment:     40w3d  Category 1 fetal heart rate tracing     Active Labor     Iron Deficiency Anemia in Pregnancy     GBS: Positive     Hx of Personality Disorder - not on medication     Hx of Alcoholism - from age 19 to age 23. Pt reports she has stopped one year ago.  Social work order will be placed.     Plan:   Continue current orders/management   Cytotec 50 mcg po had been given at 20:00 , orders will be changed to 25 mcg po q 2 hours starting at midnight if needed instead of 50 mcg po q 4 hours.   Cervical Cook Catheter is still in place.    CNM management   Anticipate     TERESITA Schaeffer CNM

## 2024-08-07 NOTE — PROGRESS NOTES
1900: Bedside and Verbal shift change report given to Say RN  (oncoming nurse) by Susannah RN    (offgoing nurse). Report included the following information Nurse Handoff Report, Adult Overview, Recent Results, Quality Measures, and Event Log.  Care assumed at this time.     2048: Infante CNM at bedside with this RN, POC reviewed pt agrees. CNM reviewing EFM/ TOCO tracing.     0030- 0101: Pt off monitor to shower with Arelis DUFFYM approval.    0640: This RN and Arelis JUDGE at bedside. Cook catheter at this time.     0645: SVE per CNM 6/70/-2.     0700: Bedside and Verbal shift change report given to Cynthia RN  (oncoming nurse) by Renetta RN  (offgoing nurse). Report included the following information Nurse Handoff Report, Adult Overview, MAR, Recent Results, Quality Measures, and Event Log.  Care handed over at this time.

## 2024-08-07 NOTE — PROGRESS NOTES
Ob Progress Note    Subjective: feeling contractions    Objective:   Patient Vitals for the past 4 hrs:   Temp Pulse Resp BP SpO2   08/07/24 0858 -- -- -- -- 98 %   08/07/24 0853 -- -- -- -- 97 %   08/07/24 0735 -- -- -- -- 97 %   08/07/24 0730 -- -- -- -- 97 %   08/07/24 0725 -- -- -- -- 97 %   08/07/24 0720 -- -- -- -- 97 %   08/07/24 0715 -- -- -- -- 97 %   08/07/24 0714 97.9 °F (36.6 °C) 75 (!) 6 (!) 113/55 98 %   08/07/24 0710 -- 73 -- -- 98 %   08/07/24 0705 -- 89 -- -- 97 %   08/07/24 0700 -- 84 -- -- 97 %              FHT's category 1.    Cervix 6 cm per nursing staff.    Contractions: moderate  every 4-5 minutes lasting 60 seconds.     Assessment/Plan:  Epidural, then AROM

## 2024-08-07 NOTE — ANESTHESIA PRE PROCEDURE
Abdominal:             Vascular: negative vascular ROS.         Other Findings:       Anesthesia Plan      epidural     ASA 2             Anesthetic plan and risks discussed with patient.      Plan discussed with attending.                Divya Zamora, APRN - CRNA   8/7/2024

## 2024-08-07 NOTE — ANESTHESIA PROCEDURE NOTES
Epidural Block    Patient location during procedure: OB  Start time: 8/7/2024 11:40 AM  End time: 8/7/2024 12:04 PM  Reason for block: labor epidural  Staffing  Performed: resident/CRNA   Anesthesiologist: Rambo Goetz MD  Resident/CRNA: Divya Zamora APRN - CRNA  Performed by: Divya Zamora APRN - CRNA  Authorized by: Divya Zamora APRN - CRNA    Epidural  Patient position: sitting  Prep: ChloraPrep  Patient monitoring: continuous pulse ox and frequent blood pressure checks  Approach: midline  Location: L4-5  Injection technique: MYNOR saline  Provider prep: mask  Needle  Needle type: Tuohy   Needle gauge: 17 G  Needle length: 3.5 in  Needle insertion depth: 5 cm  Catheter type: end hole  Catheter size: 19 G  Catheter at skin depth: 11 cm  Test dose: negativeCatheter Secured: tegaderm and tape  Assessment  Hemodynamics: stable  Attempts: 1  Outcomes: patient tolerated procedure well  Additional Notes  Pt C/O pain 7/10 with contractions, epidural placed as noted, without difficulty  Preanesthetic Checklist  Completed: patient identified, IV checked, site marked, risks and benefits discussed, surgical/procedural consents, equipment checked, pre-op evaluation, timeout performed, anesthesia consent given, oxygen available, monitors applied/VS acknowledged, fire risk safety assessment completed and verbalized and blood product R/B/A discussed and consented

## 2024-08-07 NOTE — PROGRESS NOTES
CNM Labor Progress Note     Patient: Destiny R Saint MRN: [id number]  SSN: [id number]    YOB: 2000  Age: 24 y.o.  Sex: adult        Subjective:   Patient coping well with contractions after having taken IV Pain medication.       Objective:   Blood pressure (!) 108/55, pulse 75, temperature 97.7 °F (36.5 °C), temperature source Temporal, resp. rate 16, last menstrual period 10/29/2023, SpO2 97 %.    Fetal heart baseline 130's, moderate variability, accelerations  not present, decelerations not present, Uterine contractions q 1-4 minutes,  moderate to palpation, resting tone soft.    Sterile Vaginal Exam:  Cervical cook catheter came out on its own.    6 cm dilated/ 70 % effaced/ -2 station, cervix midline , fetal presentation vertex, membranes intact.     Assessment:     40w3d  Category 1 fetal heart rate tracing   Active Labor     Iron Deficiency Anemia in Pregnancy  - H/H = 11.2 / 34.9 of 2024. Pt had received two iron infusion during the pregnancy.     GBS: Positive     Hx of Personality Disorder - not on medication.    Hx of Alcoholism - from age 19 to age 23. Pt reports she has stopped one year ago.  Social work order will be placed.     Plan:   Continue current orders/management   IV PCN has been initiated for GBS Prophylaxis and to be continued q 4 hours.    IV Pitocin to be started at 8 am if her contractions space out.   CNM management until 8 am.   Anticipate TERESITA Coronado - NU

## 2024-08-07 NOTE — PROGRESS NOTES
Ob Progress Note    Subjective: comfortable with epidural    Objective:   Patient Vitals for the past 4 hrs:   Temp Pulse Resp BP SpO2   08/07/24 1531 97.9 °F (36.6 °C) -- 16 -- --   08/07/24 1506 -- 64 -- 132/79 --   08/07/24 1504 -- 65 -- -- --   08/07/24 1459 -- 61 -- -- --   08/07/24 1454 -- 65 -- -- --   08/07/24 1450 98.2 °F (36.8 °C) -- 16 -- --   08/07/24 1449 -- 67 -- -- --   08/07/24 1444 -- 63 -- -- --   08/07/24 1439 -- 65 -- -- --   08/07/24 1434 -- 74 -- -- --   08/07/24 1429 -- 74 -- -- --   08/07/24 1427 -- 65 -- 123/77 --   08/07/24 1424 -- 61 -- -- 99 %   08/07/24 1419 -- 54 -- -- 98 %   08/07/24 1414 -- 62 -- -- 98 %   08/07/24 1409 -- 63 -- -- 98 %   08/07/24 1404 -- 64 -- -- 97 %   08/07/24 1359 -- 63 -- -- 96 %   08/07/24 1354 -- 64 -- -- 97 %   08/07/24 1349 -- 69 -- -- 97 %   08/07/24 1347 -- 66 -- 120/71 --   08/07/24 1344 -- 69 -- -- 98 %   08/07/24 1339 -- 71 -- -- 98 %   08/07/24 1334 -- 71 -- -- 98 %   08/07/24 1329 -- 71 -- -- 98 %   08/07/24 1328 98.2 °F (36.8 °C) -- 16 -- --   08/07/24 1324 -- 77 -- -- 99 %   08/07/24 1319 -- 73 -- -- 97 %   08/07/24 1314 -- 78 -- -- 98 %   08/07/24 1309 -- 84 -- -- 98 %   08/07/24 1307 -- 83 -- 112/74 --   08/07/24 1304 -- 78 -- -- 99 %   08/07/24 1259 -- 82 -- -- 98 %   08/07/24 1254 -- 77 -- -- 98 %   08/07/24 1249 -- 76 -- -- 98 %   08/07/24 1244 -- 80 -- -- 98 %   08/07/24 1239 -- 76 -- -- 98 %   08/07/24 1235 97.9 °F (36.6 °C) -- 16 -- --   08/07/24 1234 -- 73 -- -- 97 %   08/07/24 1229 -- 80 -- -- 97 %   08/07/24 1225 -- 75 -- 110/65 --   08/07/24 1224 -- 84 -- -- 98 %   08/07/24 1219 -- 82 -- -- 97 %              FHT's category 1.    Contractions: good  every 2-3 minutes lasting 60 seconds.     Cervix 8 cm/80/-2 with some molding    Assessment/Plan:  Progressing with descent and molding

## 2024-08-07 NOTE — PROGRESS NOTES
0700: Bedside and Verbal shift change report given to Estephania RN (oncoming nurse) by FELICIA Deal RN (offgoing nurse). Report included the following information Nurse Handoff Report, Index, Intake/Output, MAR, Recent Results, and Med Rec Status.     1140: MIGUEL Zamora at bedide to place epidural.     1200: Epidural procedure complete,.pt repositioned back in bed.    1235: Dr. Tomlinson at bedside, AROM performed by MD, clear fluid noted. Pads changed. Exam unchanged.    1555: Dr. Tomlinson at bedside, SVE per MD 8/80/-2.    1900: Bedside and Verbal shift change report given to May RN (oncoming nurse) by Estephania RN (offgoing nurse). Report included the following information Nurse Handoff Report, Index, Intake/Output, MAR, Recent Results, and Med Rec Status.

## 2024-08-08 LAB — T PALLIDUM AB SER QL IA: NON REACTIVE

## 2024-08-08 PROCEDURE — 7210000100 HC LABOR FEE PER 1 HR: Performed by: STUDENT IN AN ORGANIZED HEALTH CARE EDUCATION/TRAINING PROGRAM

## 2024-08-08 PROCEDURE — 0KQM0ZZ REPAIR PERINEUM MUSCLE, OPEN APPROACH: ICD-10-PCS | Performed by: OBSTETRICS & GYNECOLOGY

## 2024-08-08 PROCEDURE — 93005 ELECTROCARDIOGRAM TRACING: CPT | Performed by: OBSTETRICS & GYNECOLOGY

## 2024-08-08 PROCEDURE — 2580000003 HC RX 258: Performed by: OBSTETRICS & GYNECOLOGY

## 2024-08-08 PROCEDURE — 7220000101 HC DELIVERY VAGINAL/SINGLE: Performed by: STUDENT IN AN ORGANIZED HEALTH CARE EDUCATION/TRAINING PROGRAM

## 2024-08-08 PROCEDURE — 2500000003 HC RX 250 WO HCPCS: Performed by: NURSE ANESTHETIST, CERTIFIED REGISTERED

## 2024-08-08 PROCEDURE — 3700000156 HC EPIDURAL ANESTHESIA: Performed by: NURSE ANESTHETIST, CERTIFIED REGISTERED

## 2024-08-08 PROCEDURE — 59400 OBSTETRICAL CARE: CPT | Performed by: OBSTETRICS & GYNECOLOGY

## 2024-08-08 PROCEDURE — 10907ZC DRAINAGE OF AMNIOTIC FLUID, THERAPEUTIC FROM PRODUCTS OF CONCEPTION, VIA NATURAL OR ARTIFICIAL OPENING: ICD-10-PCS | Performed by: OBSTETRICS & GYNECOLOGY

## 2024-08-08 PROCEDURE — 6370000000 HC RX 637 (ALT 250 FOR IP): Performed by: OBSTETRICS & GYNECOLOGY

## 2024-08-08 PROCEDURE — 4A1HXCZ MONITORING OF PRODUCTS OF CONCEPTION, CARDIAC RATE, EXTERNAL APPROACH: ICD-10-PCS | Performed by: OBSTETRICS & GYNECOLOGY

## 2024-08-08 PROCEDURE — 00HU33Z INSERTION OF INFUSION DEVICE INTO SPINAL CANAL, PERCUTANEOUS APPROACH: ICD-10-PCS | Performed by: OBSTETRICS & GYNECOLOGY

## 2024-08-08 PROCEDURE — 1120000000 HC RM PRIVATE OB

## 2024-08-08 PROCEDURE — 3E0P7VZ INTRODUCTION OF HORMONE INTO FEMALE REPRODUCTIVE, VIA NATURAL OR ARTIFICIAL OPENING: ICD-10-PCS | Performed by: OBSTETRICS & GYNECOLOGY

## 2024-08-08 PROCEDURE — 6360000002 HC RX W HCPCS: Performed by: OBSTETRICS & GYNECOLOGY

## 2024-08-08 RX ORDER — ACETAMINOPHEN 500 MG
1000 TABLET ORAL EVERY 8 HOURS SCHEDULED
Status: DISCONTINUED | OUTPATIENT
Start: 2024-08-08 | End: 2024-08-10 | Stop reason: HOSPADM

## 2024-08-08 RX ORDER — SODIUM CHLORIDE 9 MG/ML
INJECTION, SOLUTION INTRAVENOUS PRN
Status: DISCONTINUED | OUTPATIENT
Start: 2024-08-08 | End: 2024-08-10 | Stop reason: HOSPADM

## 2024-08-08 RX ORDER — ONDANSETRON 2 MG/ML
4 INJECTION INTRAMUSCULAR; INTRAVENOUS EVERY 6 HOURS PRN
Status: DISCONTINUED | OUTPATIENT
Start: 2024-08-08 | End: 2024-08-10 | Stop reason: HOSPADM

## 2024-08-08 RX ORDER — LANOLIN/MINERAL OIL
LOTION (ML) TOPICAL PRN
Status: DISCONTINUED | OUTPATIENT
Start: 2024-08-08 | End: 2024-08-10 | Stop reason: HOSPADM

## 2024-08-08 RX ORDER — SODIUM CHLORIDE 0.9 % (FLUSH) 0.9 %
5-40 SYRINGE (ML) INJECTION PRN
Status: DISCONTINUED | OUTPATIENT
Start: 2024-08-08 | End: 2024-08-08

## 2024-08-08 RX ORDER — POLYETHYLENE GLYCOL 3350 17 G/17G
17 POWDER, FOR SOLUTION ORAL DAILY PRN
Status: DISCONTINUED | OUTPATIENT
Start: 2024-08-08 | End: 2024-08-10 | Stop reason: HOSPADM

## 2024-08-08 RX ORDER — ACETAMINOPHEN 500 MG
1000 TABLET ORAL EVERY 8 HOURS PRN
Status: DISCONTINUED | OUTPATIENT
Start: 2024-08-08 | End: 2024-08-08

## 2024-08-08 RX ORDER — OXYCODONE HYDROCHLORIDE 5 MG/1
5 TABLET ORAL EVERY 4 HOURS PRN
Status: DISCONTINUED | OUTPATIENT
Start: 2024-08-08 | End: 2024-08-10 | Stop reason: HOSPADM

## 2024-08-08 RX ORDER — FAMOTIDINE 20 MG/1
20 TABLET, FILM COATED ORAL 2 TIMES DAILY PRN
Status: DISCONTINUED | OUTPATIENT
Start: 2024-08-08 | End: 2024-08-10 | Stop reason: HOSPADM

## 2024-08-08 RX ORDER — METHYLERGONOVINE MALEATE 0.2 MG/ML
200 INJECTION INTRAVENOUS PRN
Status: DISCONTINUED | OUTPATIENT
Start: 2024-08-08 | End: 2024-08-10 | Stop reason: HOSPADM

## 2024-08-08 RX ORDER — TRANEXAMIC ACID 10 MG/ML
1000 INJECTION, SOLUTION INTRAVENOUS
Status: ACTIVE | OUTPATIENT
Start: 2024-08-08 | End: 2024-08-09

## 2024-08-08 RX ORDER — MISOPROSTOL 200 UG/1
200 TABLET ORAL PRN
Status: DISCONTINUED | OUTPATIENT
Start: 2024-08-08 | End: 2024-08-10 | Stop reason: HOSPADM

## 2024-08-08 RX ORDER — OXYCODONE HYDROCHLORIDE 5 MG/1
10 TABLET ORAL EVERY 4 HOURS PRN
Status: DISCONTINUED | OUTPATIENT
Start: 2024-08-08 | End: 2024-08-10 | Stop reason: HOSPADM

## 2024-08-08 RX ORDER — MAGNESIUM CARB/ALUMINUM HYDROX 105-160MG
30 TABLET,CHEWABLE ORAL DAILY PRN
Status: DISCONTINUED | OUTPATIENT
Start: 2024-08-08 | End: 2024-08-08

## 2024-08-08 RX ORDER — SENNA AND DOCUSATE SODIUM 50; 8.6 MG/1; MG/1
1 TABLET, FILM COATED ORAL DAILY
Status: DISCONTINUED | OUTPATIENT
Start: 2024-08-08 | End: 2024-08-10 | Stop reason: HOSPADM

## 2024-08-08 RX ORDER — SODIUM CHLORIDE 0.9 % (FLUSH) 0.9 %
5-40 SYRINGE (ML) INJECTION EVERY 12 HOURS SCHEDULED
Status: DISCONTINUED | OUTPATIENT
Start: 2024-08-08 | End: 2024-08-10 | Stop reason: HOSPADM

## 2024-08-08 RX ORDER — ONDANSETRON 4 MG/1
4 TABLET, ORALLY DISINTEGRATING ORAL EVERY 6 HOURS PRN
Status: DISCONTINUED | OUTPATIENT
Start: 2024-08-08 | End: 2024-08-10 | Stop reason: HOSPADM

## 2024-08-08 RX ORDER — LACTULOSE 10 G/15ML
10 SOLUTION ORAL 2 TIMES DAILY PRN
Status: DISCONTINUED | OUTPATIENT
Start: 2024-08-08 | End: 2024-08-10 | Stop reason: HOSPADM

## 2024-08-08 RX ORDER — SIMETHICONE 80 MG
80 TABLET,CHEWABLE ORAL EVERY 6 HOURS PRN
Status: DISCONTINUED | OUTPATIENT
Start: 2024-08-08 | End: 2024-08-10 | Stop reason: HOSPADM

## 2024-08-08 RX ORDER — DIPHENHYDRAMINE HCL 25 MG
25 CAPSULE ORAL EVERY 6 HOURS PRN
Status: DISCONTINUED | OUTPATIENT
Start: 2024-08-08 | End: 2024-08-10 | Stop reason: HOSPADM

## 2024-08-08 RX ADMIN — OXYCODONE 5 MG: 5 TABLET ORAL at 22:00

## 2024-08-08 RX ADMIN — Medication 10 ML/HR: at 02:07

## 2024-08-08 RX ADMIN — ACETAMINOPHEN 1000 MG: 500 TABLET ORAL at 13:04

## 2024-08-08 RX ADMIN — SODIUM CHLORIDE, POTASSIUM CHLORIDE, SODIUM LACTATE AND CALCIUM CHLORIDE: 600; 310; 30; 20 INJECTION, SOLUTION INTRAVENOUS at 03:35

## 2024-08-08 RX ADMIN — ACETAMINOPHEN 1000 MG: 500 TABLET ORAL at 20:36

## 2024-08-08 RX ADMIN — SODIUM CHLORIDE 2.5 MILLION UNITS: 9 INJECTION, SOLUTION INTRAVENOUS at 00:03

## 2024-08-08 RX ADMIN — DIPHENHYDRAMINE HYDROCHLORIDE 25 MG: 25 CAPSULE ORAL at 22:27

## 2024-08-08 RX ADMIN — OXYCODONE 5 MG: 5 TABLET ORAL at 16:57

## 2024-08-08 RX ADMIN — SODIUM CHLORIDE 2.5 MILLION UNITS: 9 INJECTION, SOLUTION INTRAVENOUS at 04:00

## 2024-08-08 ASSESSMENT — PAIN DESCRIPTION - ORIENTATION
ORIENTATION: ANTERIOR
ORIENTATION: LOWER
ORIENTATION: LOWER
ORIENTATION: LOWER;POSTERIOR

## 2024-08-08 ASSESSMENT — PAIN SCALES - GENERAL
PAINLEVEL_OUTOF10: 6
PAINLEVEL_OUTOF10: 7
PAINLEVEL_OUTOF10: 6
PAINLEVEL_OUTOF10: 6

## 2024-08-08 ASSESSMENT — PAIN DESCRIPTION - DESCRIPTORS
DESCRIPTORS: SORE;ACHING
DESCRIPTORS: CRAMPING;SORE
DESCRIPTORS: SORE
DESCRIPTORS: SORE

## 2024-08-08 ASSESSMENT — PAIN DESCRIPTION - LOCATION
LOCATION: ABDOMEN
LOCATION: BACK;PERINEUM
LOCATION: ABDOMEN;PERINEUM;BACK
LOCATION: ABDOMEN;PERINEUM

## 2024-08-08 ASSESSMENT — PAIN - FUNCTIONAL ASSESSMENT
PAIN_FUNCTIONAL_ASSESSMENT: ACTIVITIES ARE NOT PREVENTED

## 2024-08-08 NOTE — PROGRESS NOTES
S This is a 23 y/o F  S/P vaginal delivery 10 hours ago has mild dyspnea when walking to bathroom. She has a little tightness in chest but no acute pain. She denies cough, hemoptysis, calf pain, syncope, headache or blurred vision. Pt had a prolonged second stage of labor and was exhausted while pushing.    O VS R-16 /68 P-89 T 97.5 PO 98%     WD,WN F in NAD.     Lungs-clear     Abdomen- obese, non-distended, non-tender, fundus firm and below umbilicus    Ext- 1+ edema  no calf tenderness      EKG WNL    A  Probable musculoskeletal chest wall pain from pushing in labor.  No obvious signs of PE or pulmonary edema.    P 1 Analgesia with motrin. 1 Watch for any worsening symptoms.

## 2024-08-08 NOTE — PROGRESS NOTES
1715: Called Dr. Lopez to report patient complains of dyspnea with exertion when walking to the bathroom. Lying still she states she does not experience any \"weight on her chest.\" Patient states she does feel \"weight on her chest\" while walking, from her neck to right below her sternum. She describes the pain/dyspnea as mild. Breath sounds clear bilaterally, O2 and other v/s WDL (see doc flow). Patient is calm, lying in bed, and does not appear to be in any distress. Per Dr. Lopez, please order EKG, after EKG he will perform a full assessment on patient.

## 2024-08-08 NOTE — PROGRESS NOTES
0700: Bedside and Verbal shift change report given to ERICA Cárdenas RN (oncoming nurse) by YOUNG Doherty RN (offgoing nurse). Report included the following information Nurse Handoff Report, Index, Adult Overview, Intake/Output, MAR, Recent Results, and Event Log. Pt currently pushing.    0847: RN remained at bedside throughout pushing.  EFM continuously assessed.  Vaginal delivery of viable female infant.    1110: RN used scott steady with another nurse to get patient to use restroom. Pt was unable to urinate at this time. Epidural catheter removed. Catheter intact.    1135: TRANSFER - OUT REPORT:    Verbal/Bedside report given to YOUNG Singleton RN on Destiny R Saint  being transferred to [facility] for routine progression of patient care       Report consisted of patient's Situation, Background, Assessment and   Recommendations(SBAR).     Information from the following report(s) Nurse Handoff Report, Index, Adult Overview, Intake/Output, MAR, Recent Results, and Event Log was reviewed with the receiving nurse.           Lines:   Peripheral IV 08/06/24 Right;Posterior Hand (Active)   Site Assessment Clean, dry & intact 08/07/24 2024   Line Status Infusing 08/07/24 2024   Line Care Connections checked and tightened 08/07/24 1226   Phlebitis Assessment No symptoms 08/07/24 1714   Infiltration Assessment 0 08/07/24 1714   Alcohol Cap Used Yes 08/07/24 1714   Dressing Status Clean, dry & intact 08/07/24 1714   Dressing Type Transparent 08/07/24 1714        Opportunity for questions and clarification was provided.      Patient transported with:  Registered Nurse

## 2024-08-08 NOTE — L&D DELIVERY NOTE
Saint, Female Irena [[id number]]      Labor Events     Labor: No   Steroids: Unknown  Cervical Ripening Date/Time:  24    Cervical Ripening Type: Santillan/EASI, Misoprostol  Antibiotics Received during Labor: Yes  Rupture Identifier: Sac 1  Rupture Date/Time:  24 12:35:00   Rupture Type: Intact, AROM  Fluid Color: Clear  Fluid Odor: None  Fluid Volume: Moderate  Induction: AROM  Augmentation: None  Labor Complications: None              Anesthesia    Method: Epidural       Labor Event Times      Labor onset date/time:        Dilation complete date/time:  24 06:20:00     Start pushing date/time:  2024 06:39:00   Decision date/time (emergent ):            Labor Length    2nd stage: 2h 27m  3rd stage: 0h 13m       Delivery Details      Delivery Date: 24 Delivery Time: 08:47:00   Delivery Type: Vaginal, Spontaneous              Reading Presentation    Presentation: Vertex       Shoulder Dystocia    Shoulder Dystocia Present?: No       Assisted Delivery Details    Forceps Attempted?: No  Vacuum Extractor Attempted?: No                           Cord    Vessels: 3 Vessels  Complications: None  Delayed Cord Clamping?: Yes  Cord Clamped Date/Time: 2024 08:55:00  Cord Blood Disposition: Discard  Gases Sent?: No              Placenta    Date/Time: 2024 09:00:00  Removal: Spontaneous  Appearance: Intact  Disposition: Discarded       Lacerations    Episiotomy: None  Perineal Lacerations: None  Other Lacerations: sulcus laceration  Sulcus Laceration: bilateral Repaired?: Yes   Number of Repair Packets: 2       Vaginal Counts    Initial Count Personnel: ASHLEY YANG  Initial Count Verified By: JENN BUSH  Intial Sponge Count: Correct Intial Needles Count: Correct Intial Instruments Count: Correct   Final Sponges Count: Correct Final Needles  Count: Correct Final Instruments Count: Correct   Final Count Personnel: ASHLEY YANG  Final Count Verified By: JENN BUSH  Accurate Final Count?:

## 2024-08-08 NOTE — ANESTHESIA POSTPROCEDURE EVALUATION
Department of Anesthesiology  Postprocedure Note    Patient: Destiny R Saint  MRN: [id number]  YOB: 2000  Date of evaluation: 8/8/2024    Procedure Summary       Date: 08/07/24 Room / Location:     Anesthesia Start: 1135 Anesthesia Stop: 08/08/24 0847    Procedure: Labor Analgesia Diagnosis:     Scheduled Providers:  Responsible Provider: Rambo Goetz MD    Anesthesia Type: epidural ASA Status: 2            Anesthesia Type: No value filed.    Tiffany Phase I:      Tiffany Phase II:      Anesthesia Post Evaluation    Patient location during evaluation: floor  Patient participation: complete - patient participated  Level of consciousness: awake and alert  Pain score: 0  Airway patency: patent  Nausea & Vomiting: no nausea  Cardiovascular status: hemodynamically stable  Respiratory status: room air  Hydration status: stable  Multimodal analgesia pain management approach    No notable events documented.

## 2024-08-08 NOTE — PROGRESS NOTES
0620 CNM at bedside for pt evaluation due to reports of constant rectal pressure.   SVE performed with pt consent. 10/100/+1. Will begin pushing following staff preparedness.    [id number] CNM at bedside pushing with patient. Care relinquished to Dr. Maricruz Soto at 0840. CNM remained at bedside to offer continued patient labor support due to pt level of fatigue and poor coping until .      Claire Clark, TERESITA-CNM

## 2024-08-08 NOTE — PROGRESS NOTES
Labor Note    Destiny R Saint  [id number]  2000   40w3d      S:  Feeling intermittent rectal pressure with contractions. Comfortable with epidural.     O:    /60   Pulse 85   Temp 98.6 °F (37 °C) (Oral)   Resp 16   LMP 10/29/2023   SpO2 98%        SVE: 7-8/80/0, vertex, +molding present, suspect asynclitic presentation.   FHT  Baseline:140 BPM  Moderate variability  Accels: present  Decelerations: None  Ctx/toco: 1.5-2.5 min, moderate to palpation, resting tone soft between    A/P:  24 y.o.  @ 40w3d admitted for induction of labor, s/p cook balloon and Miso x 1 dose.  - GBS positive  - CAT I FHT    SVE performed with pt consent. 7-8/80/0. Continues to leak clear fluid. Remains afebrile.  Discussed likely asynclitic presentation. Encouraged frequent position changes to promote fetal rotation and continued descent.  Will continue to monitor contraction pattern for possible pitocin augmentation if needed.  Will plan to recheck SVE in 2-3 hrs or sooner with FHT changes or pt reports of constant rectal pressure.  Pt verbalized understanding of POC and is agreeable.        - FWB:  CEFM/[place]  - Labor course- Expectant management   - GBS- Continue current orders  - Pain control - Epidural infusing  - Anticipate .      Signed:    STEPHON Olson

## 2024-08-08 NOTE — PROGRESS NOTES
1900: Report received from Cynthia BUSH, POC discussed, lines checked, patient care assumed at this time.     1915: Call from Dr. Anshu MD requested this RN to hold off on any cervical exams until her or Claire CNM are available in order to assess fetal station/positioning/size/pelvic space.     2100: Call to Claire JUDGE to discuss plan of care. CNM stated she would come bedside shortly to assess pts SVE.    2115: Claire CNM bedside for SVE, cervix 7-8/80/0 at this time. This RN brought up IUPC placement/pitocin. CNM stated if ctx space out enough we will discuss introducing pitocin and if subsequent cervical exams are the same we will discuss IUPC placement. CNM encouraged frequent position changes and patient agreeable with the plan of care.     2300: CNM Dennisekromana bedside, as ctx have spaced out a little bit this RN and CNM bedside to discuss starting pitocin to patient. Patient consents to pitocin starting at this time.       0010: Call to NU Rangel regarding pt feeling intermittent vaginal pressure and last exam being 3 hours ago. Discussed pitocin still being at 2 due to contraction pattern.     0105: CNM Denniseka bedside to review strip and perform SVE. Patient had just been repositioned. CNM stated she will come back during next position change to assess cervix.    0141: CNM Denniseka bedside for SVE. CNM expressed fetal descent was felt along with thinning out of cervix. SVE 9-9.5/100/0.      0251: CNM Leneka bedside due to pts discomfort and pressure, SVE preformed and still 9-9.5/100/0. CNM stated the baby still feels asynclitic. IUPC not placed at this time due to fetal positioning/ being well applied to cervix. CNM stated she would call Dr. Andrews to assess if she is able to attempt manual rotation of the baby.     0328: Call to Dayo RIVERA due to patient feeling breakthrough pain with her epidural despite pushing epidural bolus button. CRNA stated she would come bedside for epidural bolus.     0335: CRNA  bedside for patient evaluation.     0342: CRNA providing epidural bolus at this time.     0520: Spoke with Dr. Andrews and NU Rangel at the nurses station regarding events of the evening and concern regarding patient not being complete yet. Dr. Andrews stated she would come bedside with NU and this RN soon in order to assess cervix and determine plan of care.    0620: SVE per NU Rangel, pt complete +1 at this time.     0639: First push! NU Rangel bedside pushing with this RN.

## 2024-08-08 NOTE — PROGRESS NOTES
Labor Note    Destiny R Saint  [id number]  2000   40w4d      S:  Feeling more rectal pressure with contractions.    O:    /60   Pulse 85   Temp 98.8 °F (37.1 °C) (Axillary)   Resp 16   LMP 10/29/2023   SpO2 98%      VSS  SVE: 9-9.5/100/0, vertex, membranes ruptured. Continues to leak clear fluid.  FHT  Baseline: 150 BPM  Moderate variability  Accels: present  Decels: Variable, periodic  Ctx/[place]: 2-4 min, moderate to palpation, resting tone soft between.    Pitocin: 2 mu    A/P:  24 y.o.  @ 40w4d admitted for induction of labor, s/p cook balloon, Miso x 1 and pitocin.   - GBS Positive  - CAT I FHT    SVE performed with pt consent. 9-9.5/100/0.   Will continue to reposition and titrate pitocin.  Will recheck SVE with pt reports of constant rectal pressure.      - FWB:  CEFM/[place]  - Labor course Continue current orders  - GBS- Continue current orders  - Pain control - Epidural infusing  - Anticipate .      Signed:     STEPHON Olson

## 2024-08-08 NOTE — PROGRESS NOTES
Labor Note    Destiny R Saint  [id number]  2000   40w4d      S:  Feeling increased vaginal/rectal pressure and lower back pain. Desires cervical exam.    O:    /60   Pulse 85   Temp 98.8 °F (37.1 °C) (Axillary)   Resp 16   LMP 10/29/2023   SpO2 98%        SVE: Unchanged. 9/100/0, vertex (9.5 cm with contraction)    FHT reviewed and remains CAT I    Pit: 4    A/P:  24 y.o.  @ 40w4d induction of labor.  - GBS positive  - CAT I FHT    CNM called to bedside for cervical exam. Pt having lots of lower back pain and pressure.     SVE performed with pt consent. Exam unchanged. Attempted to reduce cervix- Maternal right reduces with ease and to maternal left cervix remains in place.     Discussed pressing epidural bolus button x 3 and if no relief requesting epidural redose.    Advised CNM will request OB hospitalist to review FHT and evaluate fetal positioning for possible manual rotation if possible. Pt agreeable with POC,      - FWB:  [facility]/[place]  - Labor course- Continue current orders  - Pain control - Epidural infusing, Bolus PRN, Will notify anesthesia if back pain does   not improve.      Signed:    TERESITA Olson-NU

## 2024-08-08 NOTE — PROGRESS NOTES
Labor Note    Destiny R Saint  [id number]  2000   40w4d      S:  Feeling intermittent vaginal pressure.     O:    /60   Pulse 85   Temp 98.8 °F (37.1 °C) (Axillary)   Resp 16   LMP 10/29/2023   SpO2 98%      VSS  SVE: Deferred, Membranes ruptured. Continues to leak clear fluid.    A/P:  24 y.o.  @ 40w4d admitted for induction of labor.   - GBS positive   - CAT I FHT    Reviewed FHT and ctx pattern. Continues to couple however has spaced out.  Discussed pitocin augmentation with r/b/a. Pt verbalized understanding and is agreeable to POC.  Advised primary RN IUPC is not necessary at this time, as pt exam has changed from earlier exam.    Will plan to recheck SVE with pt reports of constant rectal pressure or sooner with FHT changes.      - FWB:  [facility]/[place]  - Labor course Pitocin per unit protocol.  - Pain control - Epidural infusing. Pt comfortable.  - Anticipate .      Signed:    STEPHON Olson

## 2024-08-09 PROBLEM — F41.9 ANXIETY: Status: ACTIVE | Noted: 2024-08-09

## 2024-08-09 LAB
EKG ATRIAL RATE: 85 BPM
EKG DIAGNOSIS: NORMAL
EKG P AXIS: 45 DEGREES
EKG P-R INTERVAL: 142 MS
EKG Q-T INTERVAL: 378 MS
EKG QRS DURATION: 88 MS
EKG QTC CALCULATION (BAZETT): 449 MS
EKG R AXIS: 24 DEGREES
EKG T AXIS: 20 DEGREES
EKG VENTRICULAR RATE: 85 BPM
ERYTHROCYTE [DISTWIDTH] IN BLOOD BY AUTOMATED COUNT: 18.5 % (ref 11.5–14.5)
HCT VFR BLD AUTO: 28 % (ref 35–47)
HGB BLD-MCNC: 9.1 G/DL (ref 11.5–16)
MCH RBC QN AUTO: 27.8 PG (ref 26–34)
MCHC RBC AUTO-ENTMCNC: 32.5 G/DL (ref 30–36.5)
MCV RBC AUTO: 85.6 FL (ref 80–99)
NRBC # BLD: 0 K/UL (ref 0–0.01)
NRBC BLD-RTO: 0 PER 100 WBC
PLATELET # BLD AUTO: 173 K/UL (ref 150–400)
PMV BLD AUTO: 11.8 FL (ref 8.9–12.9)
RBC # BLD AUTO: 3.27 M/UL (ref 3.8–5.2)
WBC # BLD AUTO: 11.6 K/UL (ref 3.6–11)

## 2024-08-09 PROCEDURE — 36415 COLL VENOUS BLD VENIPUNCTURE: CPT

## 2024-08-09 PROCEDURE — 93010 ELECTROCARDIOGRAM REPORT: CPT | Performed by: SPECIALIST

## 2024-08-09 PROCEDURE — 6370000000 HC RX 637 (ALT 250 FOR IP): Performed by: OBSTETRICS & GYNECOLOGY

## 2024-08-09 PROCEDURE — 1120000000 HC RM PRIVATE OB

## 2024-08-09 PROCEDURE — 85027 COMPLETE CBC AUTOMATED: CPT

## 2024-08-09 RX ORDER — ALPRAZOLAM 0.5 MG/1
0.5 TABLET ORAL 3 TIMES DAILY PRN
Qty: 30 TABLET | Refills: 0 | Status: SHIPPED | OUTPATIENT
Start: 2024-08-09 | End: 2024-09-08

## 2024-08-09 RX ORDER — ALPRAZOLAM 0.5 MG/1
0.5 TABLET ORAL 3 TIMES DAILY PRN
Status: DISCONTINUED | OUTPATIENT
Start: 2024-08-09 | End: 2024-08-10 | Stop reason: HOSPADM

## 2024-08-09 RX ORDER — LAMOTRIGINE 150 MG/1
150 TABLET ORAL 2 TIMES DAILY
Qty: 30 TABLET | Refills: 3 | Status: SHIPPED | OUTPATIENT
Start: 2024-08-09

## 2024-08-09 RX ORDER — OXYCODONE HYDROCHLORIDE AND ACETAMINOPHEN 5; 325 MG/1; MG/1
1 TABLET ORAL EVERY 6 HOURS PRN
Qty: 20 TABLET | Refills: 0 | Status: SHIPPED | OUTPATIENT
Start: 2024-08-09 | End: 2024-08-16

## 2024-08-09 RX ADMIN — SENNOSIDES AND DOCUSATE SODIUM 1 TABLET: 50; 8.6 TABLET ORAL at 21:52

## 2024-08-09 RX ADMIN — LAMOTRIGINE 150 MG: 100 TABLET ORAL at 20:41

## 2024-08-09 RX ADMIN — LAMOTRIGINE 150 MG: 100 TABLET ORAL at 09:46

## 2024-08-09 RX ADMIN — ACETAMINOPHEN 1000 MG: 500 TABLET ORAL at 07:30

## 2024-08-09 RX ADMIN — OXYCODONE 5 MG: 5 TABLET ORAL at 20:41

## 2024-08-09 RX ADMIN — ACETAMINOPHEN 1000 MG: 500 TABLET ORAL at 15:27

## 2024-08-09 RX ADMIN — OXYCODONE 5 MG: 5 TABLET ORAL at 03:15

## 2024-08-09 RX ADMIN — OXYCODONE 5 MG: 5 TABLET ORAL at 12:36

## 2024-08-09 RX ADMIN — ALPRAZOLAM 0.5 MG: 0.5 TABLET ORAL at 20:41

## 2024-08-09 RX ADMIN — ALPRAZOLAM 0.5 MG: 0.5 TABLET ORAL at 10:28

## 2024-08-09 RX ADMIN — ACETAMINOPHEN 1000 MG: 500 TABLET ORAL at 23:58

## 2024-08-09 RX ADMIN — OXYCODONE 5 MG: 5 TABLET ORAL at 17:02

## 2024-08-09 ASSESSMENT — PAIN DESCRIPTION - DESCRIPTORS
DESCRIPTORS: SORE;ACHING
DESCRIPTORS: SORE;CRAMPING
DESCRIPTORS: CRAMPING
DESCRIPTORS: SORE
DESCRIPTORS: CRAMPING;ACHING

## 2024-08-09 ASSESSMENT — PAIN SCALES - GENERAL
PAINLEVEL_OUTOF10: 6
PAINLEVEL_OUTOF10: 5
PAINLEVEL_OUTOF10: 7
PAINLEVEL_OUTOF10: 7
PAINLEVEL_OUTOF10: 6
PAINLEVEL_OUTOF10: 5

## 2024-08-09 ASSESSMENT — PAIN DESCRIPTION - ORIENTATION
ORIENTATION: LOWER;POSTERIOR
ORIENTATION: LOWER
ORIENTATION: LOWER
ORIENTATION: LOWER;POSTERIOR
ORIENTATION: LOWER

## 2024-08-09 ASSESSMENT — PAIN DESCRIPTION - LOCATION
LOCATION: ABDOMEN;PERINEUM
LOCATION: ABDOMEN
LOCATION: ABDOMEN;BACK;PERINEUM
LOCATION: BACK
LOCATION: PERINEUM;ABDOMEN
LOCATION: ABDOMEN;BACK
LOCATION: ABDOMEN;PERINEUM

## 2024-08-09 ASSESSMENT — PAIN - FUNCTIONAL ASSESSMENT
PAIN_FUNCTIONAL_ASSESSMENT: ACTIVITIES ARE NOT PREVENTED

## 2024-08-09 NOTE — DISCHARGE SUMMARY
Obstetrical Discharge Summary     Name: Destiny R Saint MRN: [id number]  SSN: [id number]    YOB: 2000  Age: 24 y.o.  Sex: adult      Admit Date: 8/6/2024    Discharge Date: 8/10/2024     Admitting Physician: Jose Patterson II, MD     Attending Physician:  Jose Patterson II, MD     Admission Diagnoses: Encounter for elective induction of labor [Z34.90]  Discharge Diagnoses:   Encounter for elective induction of labor [Z34.90]    Additional Diagnoses: Principal Problem:    Encounter for elective induction of labor  Active Problems:    Anemia affecting pregnancy in third trimester    GBS carrier    40 weeks gestation of pregnancy    Anxiety  Resolved Problems:    * No resolved hospital problems. *       Immunization(s):   Immunization History   Administered Date(s) Administered    COVID-19, MODERNA BLUE border, Primary or Immunocompromised, (age 12y+), IM, 100 mcg/0.5mL 05/13/2021, 06/08/2021    Influenza Virus Vaccine 09/13/2020    Influenza, FLUCELVAX, (age 6 mo+), MDCK, PF, 0.5mL 09/27/2023    TD 2LF, TDVAX, (age 7y+), IM, 0.5mL 08/30/2022    TDaP, ADACEL (age 10y-64y), BOOSTRIX (age 10y+), IM, 0.5mL 05/13/2024        Delivery Information:  Delivery Type: Vaginal, Spontaneous      By: JENNY RAMIREZ    On: 8/8/2024   at: 8:47 AM  '     Hospital Course: Normal hospital course following the delivery.  The patient was released to her home in good condition.    Patient Instructions:     Reference my discharge instructions.    Current Discharge Medication List        START taking these medications    Details   lamoTRIgine (LAMICTAL) 150 MG tablet Take 1 tablet by mouth 2 times daily  Qty: 30 tablet, Refills: 3  Start date: 8/9/2024      ALPRAZolam (XANAX) 0.5 MG tablet Take 1 tablet by mouth 3 times daily as needed for Anxiety for up to 30 days. Max Daily Amount: 1.5 mg  Qty: 30 tablet, Refills: 0  Start date: 8/9/2024, End date: 9/8/2024    Associated Diagnoses: Anxiety      oxyCODONE-acetaminophen (PERCOCET)

## 2024-08-09 NOTE — CARE COORDINATION
08/09/24 1004   Service Assessment   Patient Orientation Alert and Oriented   Cognition Alert   History Provided By Patient   Primary Caregiver Self   Accompanied By/Relationship BARRETT Grubbs   Support Systems Spouse/Significant Other;Family Members   Patient's Healthcare Decision Maker is: Legal Next of Kin   PCP Verified by CM Yes   Social/Functional History   Lives With Significant other;Other (comment)  (Brother and cousin of BARRETT)   ADL Assistance Independent   Discharge Planning   Living Arrangements Spouse/Significant Other;Family Members   Potential Assistance Needed Durable Medical Equipment   Potential DME Needed Other (Comment)  (Breast pump)   Patient expects to be discharged to: House   Services At/After Discharge   Transition of Care Consult (CM Consult) Other   Confirm Follow Up Transport Friends     CM met with patient and FOB, Willafredi Marlene, to complete initial assessment and begin discharge planning.  This is their first child.  Patient lives at confirmed charted address with her boyfriend and his brother and cousin.  All are supportive but work outside the home and do not have a set work schedule.  BARRETT works for [employer] as a  and is unable to take additional leave (he was granted 8/3-8/7/24).  Patient is anxious about this, but states that her grandmother will be coming to stay with her for 2 weeks.  BARRETT's sister is also an additional support.  Patient is unemployed and receiving SSI.  She wants to enroll in [facility] and CM provided her and BARRETT with the contact information for their local [facility] office.  Patient reports concerns over postpartum depression.  She has a follow-up appointment with her psychiatrist on Tuesday.  CM provided patient with information on support groups for postpartum depression.  BARRETT was provided with information on tips for supporting someone with postpartum depression as well as information on new dad support.  Patient plans to breast and bottle feed her

## 2024-08-09 NOTE — LACTATION NOTE
This note was copied from a baby's chart.  Mother states that she was having difficulty latching her  but has been nursing well on her left side. This is her first baby to breast feed. LC placed infant on mother skin to skin in the prone position. There, LC taught alignment and breast support. After a few attempts, infant latched well and rhythmically. Audible swallows heard. Infant fed well for 20 minutes in this position while talking with physician, nurse, and . LC to round at mother's request. LC encouraged mother to nurse on demand or every 2-3 hours. Hand expression encouraged with sleepy feedings. A breast feeding booklet was provided.    Discussed with mother her plan for feeding.  Reviewed the benefits of exclusive breast milk feeding during the hospital stay.   Informed her of the risks of using formula to supplement in the first few days of life as well as the benefits of successful breast milk feeding; referred her to the Breastfeeding booklet about this information.   She acknowledges understanding of information reviewed and states that it is her plan to breast feed her infant.  Will support her choice and offer additional information as needed.     Biological Nurturing breastfeeding principles taught.  How Biological Nurturing (BN)  promotes optimal breastfeeding (BF) sessions discussed.  Mother encouraged to seek comfortable semi-reclining breastfeeding positions.  Infant placed frontally along maternal contour.  Primitive innate feeding reflexes/behaviors of the  discussed.  BN tips and techniques shared; assisted with comfortable breastfeeding positioning.         Pt will successfully establish breastfeeding by feeding in response to early feeding cues or wake every 3h, will obtain deep latch, and will keep log of feedings/output.  Taught to BF at hunger cues and or q 2-3 hrs and to offer 10-20 drops of hand expressed colostrum at any non-feeds.      Left Breast:

## 2024-08-09 NOTE — PROGRESS NOTES
POST PARTUM DAY 1  PROGRESS NOTE      Patient is struggling with anxiety and had a panic attack earlier.  She has an appointment with psychiatrist next Tuesday. She wants to start back on lamotrigine.   No heavy bleeding.  Pain is well controlled with current medications.  The baby is doing well.    Urinary output is adequate. Voiding without difficulty. The patient is ambulating well.  Tolerating a regular diet.     Vitals:    Patient Vitals for the past 24 hrs:   BP Temp Temp src Pulse Resp SpO2   08/09/24 0617 112/70 97.9 °F (36.6 °C) -- 84 18 99 %   08/08/24 2302 132/74 98.1 °F (36.7 °C) Oral 89 16 98 %   08/08/24 1745 127/68 97.5 °F (36.4 °C) Oral 89 20 98 %   08/08/24 1657 112/72 98 °F (36.7 °C) Oral 98 16 98 %   08/08/24 1433 108/69 98.2 °F (36.8 °C) Oral (!) 101 18 98 %   08/08/24 1304 112/62 99.1 °F (37.3 °C) Oral 79 16 96 %       Exam:     Patient without distress.  Abdomen soft, fundus firm, non-tender, fundus firm at U-1  Perineum with normal lochia noted.  Lower extremities are negative for swelling, cords or tenderness.    Labs:   Recent Results (from the past 24 hour(s))   EKG 12 Lead    Collection Time: 08/08/24  6:04 PM   Result Value Ref Range    Ventricular Rate 85 BPM    Atrial Rate 85 BPM    P-R Interval 142 ms    QRS Duration 88 ms    Q-T Interval 378 ms    QTc Calculation (Bazett) 449 ms    P Axis 45 degrees    R Axis 24 degrees    T Axis 20 degrees    Diagnosis       Normal sinus rhythm  Normal ECG  No previous ECGs available     CBC    Collection Time: 08/09/24  6:15 AM   Result Value Ref Range    WBC 11.6 (H) 3.6 - 11.0 K/uL    RBC 3.27 (L) 3.80 - 5.20 M/uL    Hemoglobin 9.1 (L) 11.5 - 16.0 g/dL    Hematocrit 28.0 (L) 35.0 - 47.0 %    MCV 85.6 80.0 - 99.0 FL    MCH 27.8 26.0 - 34.0 PG    MCHC 32.5 30.0 - 36.5 g/dL    RDW 18.5 (H) 11.5 - 14.5 %    Platelets 173 150 - 400 K/uL    MPV 11.8 8.9 - 12.9 FL    Nucleated RBCs 0.0 0  WBC    nRBC 0.00 0.00 - 0.01 K/uL       Assessment:    Status post vaginal delivery.    Doing well postpartum day 1.    Hospital Problems:  Principal Problem:    Encounter for elective induction of labor  Active Problems:    Anemia affecting pregnancy in third trimester    GBS carrier    40 weeks gestation of pregnancy    Anxiety  Resolved Problems:    * No resolved hospital problems. *       Plan:   Routine postpartum care   Start Lamotrigine 150 BID   Xanax PRN 0.5  Probably home tomorrow.

## 2024-08-10 VITALS
DIASTOLIC BLOOD PRESSURE: 81 MMHG | HEART RATE: 86 BPM | RESPIRATION RATE: 18 BRPM | SYSTOLIC BLOOD PRESSURE: 131 MMHG | TEMPERATURE: 97.7 F | OXYGEN SATURATION: 100 %

## 2024-08-10 PROCEDURE — 6370000000 HC RX 637 (ALT 250 FOR IP): Performed by: OBSTETRICS & GYNECOLOGY

## 2024-08-10 RX ADMIN — OXYCODONE 5 MG: 5 TABLET ORAL at 00:57

## 2024-08-10 RX ADMIN — OXYCODONE HYDROCHLORIDE 10 MG: 5 TABLET ORAL at 09:45

## 2024-08-10 RX ADMIN — ACETAMINOPHEN 1000 MG: 500 TABLET ORAL at 09:45

## 2024-08-10 RX ADMIN — ALPRAZOLAM 0.5 MG: 0.5 TABLET ORAL at 09:45

## 2024-08-10 RX ADMIN — LAMOTRIGINE 150 MG: 100 TABLET ORAL at 10:43

## 2024-08-10 RX ADMIN — OXYCODONE 5 MG: 5 TABLET ORAL at 04:27

## 2024-08-10 ASSESSMENT — PAIN DESCRIPTION - ORIENTATION
ORIENTATION: LOWER;MID
ORIENTATION: LOWER

## 2024-08-10 ASSESSMENT — PAIN SCALES - GENERAL
PAINLEVEL_OUTOF10: 5
PAINLEVEL_OUTOF10: 8
PAINLEVEL_OUTOF10: 7

## 2024-08-10 ASSESSMENT — PAIN - FUNCTIONAL ASSESSMENT
PAIN_FUNCTIONAL_ASSESSMENT: ACTIVITIES ARE NOT PREVENTED
PAIN_FUNCTIONAL_ASSESSMENT: ACTIVITIES ARE NOT PREVENTED

## 2024-08-10 ASSESSMENT — PAIN DESCRIPTION - DESCRIPTORS
DESCRIPTORS: ACHING
DESCRIPTORS: SORE;ACHING
DESCRIPTORS: ACHING;SPASM

## 2024-08-10 ASSESSMENT — PAIN DESCRIPTION - LOCATION
LOCATION: PERINEUM
LOCATION: BACK
LOCATION: BACK

## 2024-08-10 NOTE — LACTATION NOTE
This note was copied from a baby's chart.  Mother states that latching her  had become difficult overnight, therefore she began pumping and will be supplementing with her breast milk and formula via bottle. LC understanding and reviewed appropriate amounts to feed to baby. Pumping encouraged every 3 hours. Engorgement management discussed as well.     Medications reviewed. Lamictal is an L2 and compatible with breast feeding. Xanax is an L3 and presumed compatible with breast feeding if taken as prescribed from physician. Mother educated on signs and symptoms to look for regarding infant behaviors. Symptoms include drowsiness, difficulty waking to feed, slow breathing, and poor weight gain. Mother did bring up that she was going to see her PCP soon and would possibly switch to taking Zoloft. Mother asked LC if taking a muscle relaxer would be safe for breast feeding and that she might request one with her follow up PCP . LC expressed that I would need to know that specific medication and dosage but to not take any medication unless prescribed by her physician and approved for breast feeding. LC expressed that taking Xanax and muscle relaxer may not be compatible with breast feeding and to check with her pediatrician or a lactation consultant should she be prescribed any thing else after discharge.     Lactation warm line provided. Breast feeding supplied provided.    Pumping:  Guidelines for pumping, milk collection and storage, proper cleaning of pump parts all reviewed.  How to establish and maintain breast milk supply through pumping reviewed.  Differences between hospital grade rental pumps vs store bought double electric/hand pumps discussed.  Set up pumping with double electric set up.  Assisted with pump session.   List of area pump rental locations and lactation support services provided.    Engorgement Care Guidelines:  Reviewed how milk is made and normal phases of milk production.  Taught care of

## 2024-08-10 NOTE — PROGRESS NOTES
Post-Partum Progress Note    Information for the patient's :  Saint, Female Irena [[id number]]   Vaginal, Spontaneous     Patient doing well without significant complaint.  Voiding without difficulty, normal lochia. Pain well-controlled on current regimen    Vitals:  /81   Pulse 86   Temp 97.7 °F (36.5 °C) (Oral)   Resp 18   LMP 10/29/2023   SpO2 100%   Breastfeeding Unknown   Temp (24hrs), Av.8 °F (36.6 °C), Min:97.7 °F (36.5 °C), Max:97.9 °F (36.6 °C)      Exam:   Patient without distress.                Abdomen soft, fundus firm at umbilicus, non-tender                 Lower extremities are negative for swelling, cords or tenderness.    Labs:     No results found for this or any previous visit (from the past 24 hour(s)).    Assessment: Doing well, PPD#2. Admitted for eIOL, s/p .    Plan:  1. Continue routine postpartum and perineal care as well as maternal education.  2. Stable for discharge home.   3. Reviewed med rec discharge list, meds should be available at pharmacy. Counseled on using stool softener prn.     Joann Rosa,

## 2024-08-28 ENCOUNTER — OFFICE VISIT (OUTPATIENT)
Age: 24
End: 2024-08-28
Payer: COMMERCIAL

## 2024-08-28 VITALS
OXYGEN SATURATION: 95 % | SYSTOLIC BLOOD PRESSURE: 103 MMHG | HEART RATE: 98 BPM | BODY MASS INDEX: 30.73 KG/M2 | HEIGHT: 67 IN | TEMPERATURE: 98 F | WEIGHT: 195.8 LBS | DIASTOLIC BLOOD PRESSURE: 72 MMHG | RESPIRATION RATE: 18 BRPM

## 2024-08-28 DIAGNOSIS — D50.8 OTHER IRON DEFICIENCY ANEMIAS: Primary | ICD-10-CM

## 2024-08-28 DIAGNOSIS — K29.70 GASTRITIS, PRESENCE OF BLEEDING UNSPECIFIED, UNSPECIFIED CHRONICITY, UNSPECIFIED GASTRITIS TYPE: ICD-10-CM

## 2024-08-28 DIAGNOSIS — F25.0 SCHIZOAFFECTIVE DISORDER, BIPOLAR TYPE (HCC): ICD-10-CM

## 2024-08-28 PROCEDURE — 99213 OFFICE O/P EST LOW 20 MIN: CPT | Performed by: NURSE PRACTITIONER

## 2024-08-28 RX ORDER — ONDANSETRON 8 MG/1
8 TABLET, ORALLY DISINTEGRATING ORAL EVERY 8 HOURS PRN
Qty: 30 TABLET | Refills: 1 | Status: SHIPPED | OUTPATIENT
Start: 2024-08-28

## 2024-08-28 NOTE — PROGRESS NOTES
[facility] [place]  Medical Oncology at [place]  [phone]    Hematology / Oncology Follow-Up      Reason for Visit:   Destiny R Saint is a 24 y.o. adult who is seen for follow up of anemia in pregnancy.    History of Present Illness:   Destiny R Saint is a 24 y.o. adult who is here for follow-up of anemia in pregnancy.     She reports feeling pretty well today. Vaginal delivery almost 3 weeks ago. Hgb 11.2 before delivery, down to 9.1 post delivery. Continues with some dizziness and lightheadedness but attributes this to probably not eating as much as she should. Having issues with gastritis and feeling full very quickly, she is out of her omeprazole. Breastfeeding her . Still having some bleeding since delivery, had second degree vaginal tear.     She is accompanied by her partner and  daughter today.    Review of systems was obtained and pertinent findings reviewed above.     Past medical history, social history, family history, medications, and allergies are located in the electronic medical record.      Physical Exam:   Blood pressure 103/72, pulse 98, temperature 98 °F (36.7 °C), temperature source Temporal, resp. rate 18, height 1.702 m (5' 7\"), weight 88.8 kg (195 lb 12.8 oz), SpO2 95%, unknown if currently breastfeeding.    General: no distress  Eyes: anicteric sclerae  HENT: oropharynx clear, poor dentition   Neck: supple  Lymphatic: no cervical, supraclavicular, or inguinal adenopathy  Respiratory: normal respiratory effort  CV: no peripheral edema  GI: soft, nontender, nondistended, no masses  Skin: no rashes; no ecchymoses; no petechiae      Results:     Lab Results   Component Value Date/Time    WBC 11.6 2024 06:15 AM    HGB 9.1 2024 06:15 AM    HCT 28.0 2024 06:15 AM     2024 06:15 AM    MCV 85.6 2024 06:15 AM    NEUTROABS 4.2 2023 11:14 PM     Lab Results   Component Value Date/Time     2024 05:27 PM    K 4.0

## 2024-08-28 NOTE — PROGRESS NOTES
Chief Complaint   Patient presents with    Follow-up           Vitals:    08/28/24 1414   BP: 103/72   Pulse: 98   Resp: 18   Temp: 98 °F (36.7 °C)   SpO2: 95%            1. Have you been to the ER, urgent care clinic since your last visit?  Hospitalized since your last visit?  Yes [facility] 8/6-8/10 Delivered baby  2. Have you seen or consulted any other health care providers outside of the [facility] since your last visit?  Include any pap smears or colon screening. No

## 2024-08-29 ENCOUNTER — OFFICE VISIT (OUTPATIENT)
Age: 24
End: 2024-08-29
Payer: COMMERCIAL

## 2024-08-29 VITALS
HEART RATE: 85 BPM | TEMPERATURE: 98.1 F | SYSTOLIC BLOOD PRESSURE: 100 MMHG | OXYGEN SATURATION: 98 % | HEIGHT: 67 IN | DIASTOLIC BLOOD PRESSURE: 68 MMHG | RESPIRATION RATE: 19 BRPM | BODY MASS INDEX: 27.78 KG/M2 | WEIGHT: 177 LBS

## 2024-08-29 DIAGNOSIS — K21.9 GASTROESOPHAGEAL REFLUX DISEASE WITHOUT ESOPHAGITIS: Primary | ICD-10-CM

## 2024-08-29 PROCEDURE — 99213 OFFICE O/P EST LOW 20 MIN: CPT | Performed by: NURSE PRACTITIONER

## 2024-08-29 NOTE — PROGRESS NOTES
Chief Complaint   Patient presents with    GI Problem     Patient is in the office today for gastritis.  Patient gave birth 3 weeks ago.  Patient stated that she was diagnosed when she was pregnant.  Patient stated that she has stomach pain and nausea.  Patient stated she hasn't been eating much.  Patient stated she wants a GI referral.  No other concerns.    \"Have you been to the ER, urgent care clinic since your last visit?  Hospitalized since your last visit?\"    NO    “Have you seen or consulted any other health care providers outside of [facility] since your last visit?”    NO     “Have you had a pap smear?”    NO    No cervical cancer screening on file             Click Here for Release of Records Request

## 2024-08-29 NOTE — PROGRESS NOTES
Destiny R Saint (:  2000) is a 24 y.o. adult, Established patient, here for evaluation of the following chief complaint(s):  GI Problem         Assessment & Plan  1. Postpartum nausea.  She reports experiencing significant nausea after eating, reminiscent of her morning sickness during pregnancy. This issue began two years ago and was initially suspected to be related to liver problems. She was prescribed omeprazole by her hematologist, which has allowed her to eat without nausea. A referral to a gastroenterologist has been made for further evaluation. She is advised to specify her preference for the [facility] office when scheduling the appointment.    2. Chronic migraines.  She has chronic migraines and is currently limited to using Tylenol due to an allergy to NSAIDs. She is advised to continue using Tylenol for migraine management. It was discussed that most migraine medications are not indicated during breastfeeding, and she is encouraged to continue breastfeeding.      Results    1. Gastroesophageal reflux disease without esophagitis  -     [facility] - Alvaro Doran MD, Gastroenterology, [place] (Reena Corrigan)    No follow-ups on file.       Subjective   History of Present Illness  The patient presents for evaluation of multiple medical concerns.    She is currently breastfeeding her child, who has a severe tongue tie, causing difficulty in latching and discomfort. Despite this, she continues to pump milk. She plans to schedule a consultation next week to address the tongue tie issue.    She experienced stomach issues prior to her pregnancy, which improved significantly during her pregnancy but have since returned postpartum. Her symptoms include nausea after consuming only a small amount of food, similar to morning sickness. This has impacted her ability to eat. She was prescribed omeprazole by her hematologist, which she took last night before eating and found it helpful. She is seeking a  referral to a gastroenterologist. Her symptoms first appeared two years ago, initially suspected to be liver-related due to her use of 800 mg ibuprofen for knee pain. However, a liver specialist informed her that she has a predisposition to nonalcoholic liver disease. She is unsure if her omeprazole prescription includes refills. She is on social security disability and her insurance did not cover the cost of the medication.    She suffers from chronic migraines. During her pregnancy, she could only take Tylenol for relief. Currently, she is unable to take any medication other than Tylenol due to her allergy to NSAIDs. 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    ALLERGIES  She is allergic to NSAIDs.    Review of Systems   A comprehensive review of system was obtained and negative except findings in the HPI      Objective   Blood pressure 100/68, pulse 85, temperature 98.1 °F (36.7 °C), temperature source Oral, resp. rate 19, height 1.702 m (5' 7\"), weight 80.3 kg (177 lb), SpO2 98%, currently breastfeeding.  Physical Exam  Physical Examination:   GENERAL ASSESSMENT: well developed and well nourished  CHEST: normal air exchange, no rales, no rhonchi, no wheezes, respiratory effort normal with no retractions  HEART: regular rate and rhythm, normal S1/S2, no murmurs             The patient (or guardian, if applicable) and other individuals in attendance with the patient were advised that Artificial Intelligence will be utilized during this visit to record, process the conversation to generate a clinical note and to support improvement of the AI technology. The patient (or guardian, if applicable) and other individuals in attendance at the appointment consented to the use of AI, including the recording.      An electronic signature was used to authenticate this note.    --TERESITA Oliva NP

## 2024-09-19 ENCOUNTER — TELEPHONE (OUTPATIENT)
Age: 24
End: 2024-09-19

## 2024-09-19 ENCOUNTER — POSTPARTUM VISIT (OUTPATIENT)
Age: 24
End: 2024-09-19

## 2024-09-19 VITALS
BODY MASS INDEX: 31.14 KG/M2 | SYSTOLIC BLOOD PRESSURE: 119 MMHG | WEIGHT: 198.4 LBS | HEIGHT: 67 IN | RESPIRATION RATE: 16 BRPM | HEART RATE: 94 BPM | DIASTOLIC BLOOD PRESSURE: 70 MMHG

## 2024-09-19 DIAGNOSIS — F41.9 ANXIETY: Primary | ICD-10-CM

## 2024-09-19 PROCEDURE — 0503F POSTPARTUM CARE VISIT: CPT | Performed by: OBSTETRICS & GYNECOLOGY

## 2024-09-19 RX ORDER — ALPRAZOLAM 0.5 MG
0.5 TABLET ORAL NIGHTLY PRN
COMMUNITY
End: 2024-09-20

## 2024-09-19 RX ORDER — DROSPIRENONE AND ETHINYL ESTRADIOL 0.02-3(28)
1 KIT ORAL DAILY
Qty: 3 PACKET | Refills: 3 | Status: SHIPPED | OUTPATIENT
Start: 2024-09-19 | End: 2024-12-12

## 2024-09-20 RX ORDER — ALPRAZOLAM 0.5 MG
TABLET ORAL
Qty: 30 TABLET | Refills: 0 | Status: SHIPPED | OUTPATIENT
Start: 2024-09-20 | End: 2024-10-20

## 2024-10-14 ENCOUNTER — OFFICE VISIT (OUTPATIENT)
Age: 24
End: 2024-10-14
Payer: COMMERCIAL

## 2024-10-14 ENCOUNTER — TELEPHONE (OUTPATIENT)
Age: 24
End: 2024-10-14

## 2024-10-14 VITALS
SYSTOLIC BLOOD PRESSURE: 109 MMHG | WEIGHT: 196 LBS | RESPIRATION RATE: 20 BRPM | BODY MASS INDEX: 30.76 KG/M2 | DIASTOLIC BLOOD PRESSURE: 71 MMHG | HEART RATE: 77 BPM | TEMPERATURE: 98.1 F | OXYGEN SATURATION: 97 % | HEIGHT: 67 IN

## 2024-10-14 DIAGNOSIS — M54.16 LUMBAR RADICULAR PAIN: Primary | ICD-10-CM

## 2024-10-14 PROCEDURE — 99213 OFFICE O/P EST LOW 20 MIN: CPT | Performed by: PHYSICIAN ASSISTANT

## 2024-10-14 RX ORDER — PREDNISONE 10 MG/1
TABLET ORAL
Qty: 21 EACH | Refills: 0 | Status: SHIPPED | OUTPATIENT
Start: 2024-10-14

## 2024-10-14 SDOH — ECONOMIC STABILITY: FOOD INSECURITY: WITHIN THE PAST 12 MONTHS, YOU WORRIED THAT YOUR FOOD WOULD RUN OUT BEFORE YOU GOT MONEY TO BUY MORE.: NEVER TRUE

## 2024-10-14 SDOH — ECONOMIC STABILITY: INCOME INSECURITY: HOW HARD IS IT FOR YOU TO PAY FOR THE VERY BASICS LIKE FOOD, HOUSING, MEDICAL CARE, AND HEATING?: NOT HARD AT ALL

## 2024-10-14 SDOH — ECONOMIC STABILITY: FOOD INSECURITY: WITHIN THE PAST 12 MONTHS, THE FOOD YOU BOUGHT JUST DIDN'T LAST AND YOU DIDN'T HAVE MONEY TO GET MORE.: NEVER TRUE

## 2024-10-14 ASSESSMENT — PATIENT HEALTH QUESTIONNAIRE - PHQ9
4. FEELING TIRED OR HAVING LITTLE ENERGY: NOT AT ALL
6. FEELING BAD ABOUT YOURSELF - OR THAT YOU ARE A FAILURE OR HAVE LET YOURSELF OR YOUR FAMILY DOWN: NOT AT ALL
1. LITTLE INTEREST OR PLEASURE IN DOING THINGS: NOT AT ALL
9. THOUGHTS THAT YOU WOULD BE BETTER OFF DEAD, OR OF HURTING YOURSELF: NOT AT ALL
10. IF YOU CHECKED OFF ANY PROBLEMS, HOW DIFFICULT HAVE THESE PROBLEMS MADE IT FOR YOU TO DO YOUR WORK, TAKE CARE OF THINGS AT HOME, OR GET ALONG WITH OTHER PEOPLE: NOT DIFFICULT AT ALL
SUM OF ALL RESPONSES TO PHQ QUESTIONS 1-9: 0
2. FEELING DOWN, DEPRESSED OR HOPELESS: NOT AT ALL
SUM OF ALL RESPONSES TO PHQ9 QUESTIONS 1 & 2: 0
SUM OF ALL RESPONSES TO PHQ QUESTIONS 1-9: 0
3. TROUBLE FALLING OR STAYING ASLEEP: NOT AT ALL
8. MOVING OR SPEAKING SO SLOWLY THAT OTHER PEOPLE COULD HAVE NOTICED. OR THE OPPOSITE, BEING SO FIGETY OR RESTLESS THAT YOU HAVE BEEN MOVING AROUND A LOT MORE THAN USUAL: NOT AT ALL
5. POOR APPETITE OR OVEREATING: NOT AT ALL
SUM OF ALL RESPONSES TO PHQ QUESTIONS 1-9: 0
SUM OF ALL RESPONSES TO PHQ QUESTIONS 1-9: 0
7. TROUBLE CONCENTRATING ON THINGS, SUCH AS READING THE NEWSPAPER OR WATCHING TELEVISION: NOT AT ALL

## 2024-10-14 NOTE — TELEPHONE ENCOUNTER
Please call the patient to see if you an appointment with Tran she would like to get a prescription of her anxiety medication.  If it is okay with Tran this visit could probably be done as a virtual visit.

## 2024-10-14 NOTE — PROGRESS NOTES
Destiny R Saint is a 24 y.o. adult , id x 2(name and ). Reviewed record, history, and  medications.      Chief Complaint   Patient presents with    Back Pain     Patient c/o back pain radiating across the back and down the right leg x1 week, this is chronic going on for years. Patient had seen a spine specialist and was told she was too young to do MRI, xray.          Vitals:    10/14/24 1054   Weight: 88.9 kg (196 lb)   Height: 1.702 m (5' 7\")             10/14/2024    10:59 AM   PHQ-9    Little interest or pleasure in doing things 0   Feeling down, depressed, or hopeless 0   Trouble falling or staying asleep, or sleeping too much 0   Feeling tired or having little energy 0   Poor appetite or overeating 0   Feeling bad about yourself - or that you are a failure or have let yourself or your family down 0   Trouble concentrating on things, such as reading the newspaper or watching television 0   Moving or speaking so slowly that other people could have noticed. Or the opposite - being so fidgety or restless that you have been moving around a lot more than usual 0   Thoughts that you would be better off dead, or of hurting yourself in some way 0   PHQ-2 Score 0   PHQ-9 Total Score 0   If you checked off any problems, how difficult have these problems made it for you to do your work, take care of things at home, or get along with other people? 0            No data to display                Social Determinants of Health     Tobacco Use: High Risk (10/14/2024)    Patient History     Smoking Tobacco Use: Every Day     Smokeless Tobacco Use: Current     Passive Exposure: Not on file   Alcohol Use: Not At Risk (2024)    AUDIT-C     Frequency of Alcohol Consumption: Never     Average Number of Drinks: Patient does not drink     Frequency of Binge Drinking: Never   Financial Resource Strain: Low Risk  (10/14/2024)    Overall Financial Resource Strain (CARDIA)     Difficulty of Paying Living Expenses: Not hard at all

## 2024-10-22 ENCOUNTER — TELEMEDICINE (OUTPATIENT)
Age: 24
End: 2024-10-22
Payer: COMMERCIAL

## 2024-10-22 DIAGNOSIS — F41.9 ANXIETY: ICD-10-CM

## 2024-10-22 DIAGNOSIS — M25.562 CHRONIC PAIN OF LEFT KNEE: ICD-10-CM

## 2024-10-22 DIAGNOSIS — G89.29 CHRONIC PAIN OF LEFT KNEE: ICD-10-CM

## 2024-10-22 DIAGNOSIS — M54.16 LUMBAR RADICULAR PAIN: Primary | ICD-10-CM

## 2024-10-22 PROCEDURE — 99214 OFFICE O/P EST MOD 30 MIN: CPT | Performed by: NURSE PRACTITIONER

## 2024-10-22 RX ORDER — ALPRAZOLAM 0.5 MG
0.5 TABLET ORAL NIGHTLY PRN
COMMUNITY

## 2024-10-22 RX ORDER — KETOROLAC TROMETHAMINE 10 MG/1
10 TABLET, FILM COATED ORAL EVERY 6 HOURS PRN
Qty: 30 TABLET | Refills: 2 | Status: SHIPPED | OUTPATIENT
Start: 2024-10-22 | End: 2025-10-22

## 2024-10-22 RX ORDER — SERTRALINE HYDROCHLORIDE 25 MG/1
25 TABLET, FILM COATED ORAL DAILY
Qty: 90 TABLET | Refills: 1 | Status: SHIPPED | OUTPATIENT
Start: 2024-10-22

## 2024-10-22 RX ORDER — HYDROXYZINE PAMOATE 25 MG/1
25 CAPSULE ORAL 3 TIMES DAILY PRN
Qty: 60 CAPSULE | Refills: 1 | Status: SHIPPED | OUTPATIENT
Start: 2024-10-22 | End: 2024-12-01

## 2024-10-22 NOTE — PROGRESS NOTES
Virtual Visit Progress Note        Destiny R Saint is a 24 y.o. adult     2 issues  - pt has ongoing, chronic back and knee pain  She plans to see an orthopedic dr soon and was recently seen here  She took a steroid for her back recently - helped for a while but her back is now back to where it was  Left knee is chronically swollen  -anxiety is also worse than it was      Medication Refill         Subjective:     Review of Systems   All other systems reviewed and are negative.      Allergies   Allergen Reactions    [place] Allergy Itching     Causes throat itching    Adhesive Tape Other (See Comments)     3M clear medical tape    Nsaids Other (See Comments)     gastritis        Prior to Admission medications    Medication Sig Start Date End Date Taking? Authorizing Provider   ALPRAZolam (XANAX) 0.5 MG tablet Take 1 tablet by mouth nightly as needed for Sleep. Max Daily Amount: 0.5 mg   Yes ProviderAdalgisa MD   ketorolac (TORADOL) 10 MG tablet Take 1 tablet by mouth every 6 hours as needed for Pain 10/22/24 10/22/25 Yes Tran Sheehan APRN - CNP   sertraline (ZOLOFT) 25 MG tablet Take 1 tablet by mouth daily 10/22/24  Yes Tran Sheehan APRN - CNP   hydrOXYzine pamoate (VISTARIL) 25 MG capsule Take 1 capsule by mouth 3 times daily as needed for Itching or Anxiety 10/22/24 12/1/24 Yes Tran Sheehan APRN - CNP   drospirenone-ethinyl estradiol (GALINA) 3-0.02 MG per tablet Take 1 tablet by mouth daily 9/19/24 12/12/24 Yes Jose Patterson II, MD   omeprazole (PRILOSEC) 20 MG delayed release capsule Take 1 capsule by mouth every morning (before breakfast) 8/28/24  Yes Kamryn Crowell APRN - NP   lamoTRIgine (LAMICTAL) 150 MG tablet Take 1 tablet by mouth 2 times daily 8/9/24  Yes Jose Patterson II, MD   Cholecalciferol (VITAMIN D) 50 MCG (2000 UT) CAPS capsule Take by mouth   Yes ProviderAdaligsa MD   diphenhydrAMINE (BENADRYL) 50 MG capsule Take 1 capsule by mouth nightly as needed for Sleep or

## 2024-10-22 NOTE — PROGRESS NOTES
Destiny R Saint is a 24 y.o. adult , id x 2(name and ). Reviewed questionnaires, and  medications.    Chief Complaint   Patient presents with    Medication Refill    Discuss Medications     Discuss xanax refill and increase. Muscle relaxer, and migraine med        No data recorded            No data to display                \"Have you been to the ER, urgent care clinic since your last visit?  Hospitalized since your last visit?\"    NO    “Have you seen or consulted any other health care providers outside of [facility] since your last visit?”    NO     “Have you had a pap smear?”    NO    No cervical cancer screening on file     Click Here for Release of Records Request      [place]    Nurse/CMA to request most recent records if not in the chart

## 2024-11-12 ENCOUNTER — TELEMEDICINE (OUTPATIENT)
Age: 24
End: 2024-11-12
Payer: COMMERCIAL

## 2024-11-12 DIAGNOSIS — R30.0 DYSURIA: Primary | ICD-10-CM

## 2024-11-12 PROCEDURE — 99214 OFFICE O/P EST MOD 30 MIN: CPT | Performed by: NURSE PRACTITIONER

## 2024-11-12 RX ORDER — PHENAZOPYRIDINE HYDROCHLORIDE 95 MG/1
95 TABLET ORAL 3 TIMES DAILY PRN
Qty: 14 TABLET | Refills: 0 | Status: SHIPPED | OUTPATIENT
Start: 2024-11-12 | End: 2024-11-17

## 2024-11-12 RX ORDER — NITROFURANTOIN 25; 75 MG/1; MG/1
100 CAPSULE ORAL 2 TIMES DAILY
Qty: 14 CAPSULE | Refills: 0 | Status: SHIPPED | OUTPATIENT
Start: 2024-11-12 | End: 2024-11-22

## 2024-11-12 ASSESSMENT — ENCOUNTER SYMPTOMS
NAUSEA: 0
ABDOMINAL PAIN: 1
VOMITING: 0

## 2024-11-12 NOTE — PROGRESS NOTES
Destiny R Saint is a 24 y.o. adult , id x 2(name and ). Reviewed questionnaires, and  medications.    Chief Complaint   Patient presents with    Urinary Tract Infection     Has urinary frequency and urinary pain. Sx's started about 2 weeks to a month ago.   Has been using cranberry juice for sx's but no medication. In the past she has had sx's resolve on their own.        No data recorded            No data to display                \"Have you been to the ER, urgent care clinic since your last visit?  Hospitalized since your last visit?\"    YES - When: approximately 1  weeks ago.  Where and Why: knee.    “Have you seen or consulted any other health care providers outside of [facility] since your last visit?”    NO     “Have you had a pap smear?”    NO    No cervical cancer screening on file     Click Here for Release of Records Request    [place]    Nurse/JOSE to request most recent records if not in the chart  
and oriented to person, place, and time.   Psychiatric:         Mood and Affect: Mood normal.         Behavior: Behavior normal.         Assessment/ Plan:     1. Dysuria      Medication Side Effects and Warnings were discussed with patient    Return if symptoms worsen or fail to improve.     I have discussed the diagnosis with the patient and the intended plan as seen in the above orders.      The patient has received an after-visit summary and questions were answered concerning future plans. Pt conveyed understanding of plan.    Will tx presumptively for a UTI and pt to f/u for in person visit if not improving in approx 3-5 days.    On this date 11/12/2024 I have spent 25 minutes reviewing previous notes, test results and face to face (virtual) with the patient discussing the diagnosis and importance of compliance with the treatment plan as well as documenting on the day of the visit.    Irena TRIANA Saint, was evaluated through a synchronous (real-time) audio-video encounter. The patient (or guardian if applicable) is aware that this is a billable service, which includes applicable co-pays. This Virtual Visit was conducted with patient's (and/or legal guardian's) consent. Patient identification was verified, and a caregiver was present when appropriate.   The patient was located at Home: [address] [place] [id number]  Provider was located at Home (Appt Dept State): [place]  Confirm you are appropriately licensed, registered, or certified to deliver care in the state where the patient is located as indicated above. If you are not or unsure, please re-schedule the visit: Yes, I confirm.       Tran Sheehan, APRN - CNP

## 2024-11-26 DIAGNOSIS — K29.70 GASTRITIS, PRESENCE OF BLEEDING UNSPECIFIED, UNSPECIFIED CHRONICITY, UNSPECIFIED GASTRITIS TYPE: ICD-10-CM

## 2024-11-26 LAB
BASOPHILS # BLD: 0 K/UL (ref 0–0.1)
BASOPHILS NFR BLD: 1 % (ref 0–1)
DIFFERENTIAL METHOD BLD: NORMAL
EOSINOPHIL # BLD: 0.1 K/UL (ref 0–0.4)
EOSINOPHIL NFR BLD: 2 % (ref 0–7)
ERYTHROCYTE [DISTWIDTH] IN BLOOD BY AUTOMATED COUNT: 12.9 % (ref 11.5–14.5)
FERRITIN SERPL-MCNC: 52 NG/ML (ref 8–252)
HCT VFR BLD AUTO: 39.5 % (ref 35–47)
HGB BLD-MCNC: 12.4 G/DL (ref 11.5–16)
IMM GRANULOCYTES # BLD AUTO: 0 K/UL (ref 0–0.04)
IMM GRANULOCYTES NFR BLD AUTO: 0 % (ref 0–0.5)
IRON SATN MFR SERPL: 14 % (ref 20–50)
IRON SERPL-MCNC: 51 UG/DL (ref 35–150)
LYMPHOCYTES # BLD: 1.1 K/UL (ref 0.8–3.5)
LYMPHOCYTES NFR BLD: 24 % (ref 12–49)
MCH RBC QN AUTO: 29.1 PG (ref 26–34)
MCHC RBC AUTO-ENTMCNC: 31.4 G/DL (ref 30–36.5)
MCV RBC AUTO: 92.7 FL (ref 80–99)
MONOCYTES # BLD: 0.4 K/UL (ref 0–1)
MONOCYTES NFR BLD: 9 % (ref 5–13)
NEUTS SEG # BLD: 2.9 K/UL (ref 1.8–8)
NEUTS SEG NFR BLD: 64 % (ref 32–75)
NRBC # BLD: 0 K/UL (ref 0–0.01)
NRBC BLD-RTO: 0 PER 100 WBC
PLATELET # BLD AUTO: 225 K/UL (ref 150–400)
PMV BLD AUTO: 12.5 FL (ref 8.9–12.9)
RBC # BLD AUTO: 4.26 M/UL (ref 3.8–5.2)
TIBC SERPL-MCNC: 357 UG/DL (ref 250–450)
WBC # BLD AUTO: 4.6 K/UL (ref 3.6–11)

## 2024-11-27 ENCOUNTER — PATIENT MESSAGE (OUTPATIENT)
Age: 24
End: 2024-11-27

## 2024-11-27 ENCOUNTER — TELEPHONE (OUTPATIENT)
Age: 24
End: 2024-11-27

## 2024-12-19 ENCOUNTER — OFFICE VISIT (OUTPATIENT)
Facility: CLINIC | Age: 24
End: 2024-12-19
Payer: COMMERCIAL

## 2024-12-19 VITALS
RESPIRATION RATE: 17 BRPM | WEIGHT: 192 LBS | BODY MASS INDEX: 30.13 KG/M2 | HEART RATE: 99 BPM | DIASTOLIC BLOOD PRESSURE: 83 MMHG | SYSTOLIC BLOOD PRESSURE: 134 MMHG | HEIGHT: 67 IN | OXYGEN SATURATION: 98 %

## 2024-12-19 DIAGNOSIS — F41.9 ANXIETY: Primary | ICD-10-CM

## 2024-12-19 LAB
ALBUMIN SERPL-MCNC: 4.1 G/DL (ref 3.5–5)
ALBUMIN/GLOB SERPL: 1.2 (ref 1.1–2.2)
ALP SERPL-CCNC: 92 U/L (ref 45–117)
ALT SERPL-CCNC: 20 U/L (ref 12–78)
ANION GAP SERPL CALC-SCNC: 8 MMOL/L (ref 2–12)
AST SERPL-CCNC: 17 U/L (ref 15–37)
BILIRUB SERPL-MCNC: 0.5 MG/DL (ref 0.2–1)
BUN SERPL-MCNC: 13 MG/DL (ref 6–20)
BUN/CREAT SERPL: 13 (ref 12–20)
CALCIUM SERPL-MCNC: 8.9 MG/DL (ref 8.5–10.1)
CHLORIDE SERPL-SCNC: 107 MMOL/L (ref 97–108)
CO2 SERPL-SCNC: 23 MMOL/L (ref 21–32)
CREAT SERPL-MCNC: 0.97 MG/DL (ref 0.55–1.02)
GLOBULIN SER CALC-MCNC: 3.4 G/DL (ref 2–4)
GLUCOSE SERPL-MCNC: 97 MG/DL (ref 65–100)
POTASSIUM SERPL-SCNC: 3.7 MMOL/L (ref 3.5–5.1)
PROT SERPL-MCNC: 7.5 G/DL (ref 6.4–8.2)
SODIUM SERPL-SCNC: 138 MMOL/L (ref 136–145)

## 2024-12-19 PROCEDURE — 99213 OFFICE O/P EST LOW 20 MIN: CPT

## 2024-12-19 RX ORDER — HYDROXYZINE PAMOATE 25 MG/1
25 CAPSULE ORAL
COMMUNITY
Start: 2024-10-22

## 2024-12-19 NOTE — ASSESSMENT & PLAN NOTE
Doing well on Zoloft, would like to increase to 50mg daily. New script sent in.    Orders:    Comprehensive Metabolic Panel; Future    Comprehensive Metabolic Panel

## 2024-12-19 NOTE — PROGRESS NOTES
Destiny R Saint is a 24 y.o. adult , id x 2(name and ). Reviewed record, history, and  medications.    Chief Complaint   Patient presents with    Annual Exam     Physical    Immunizations     Flu shoot        Health Maintenance Due   Topic    Pneumococcal 0-64 years Vaccine (1 of 2 - PCV)    Varicella vaccine (1 of 2 - 13+ 2-dose series)    HPV vaccine (1 - 3-dose series)    Hepatitis A vaccine (1 of 2 - Risk 2-dose series)    Hepatitis B vaccine (1 of 3 - 19+ 3-dose series)    Pap smear     Flu vaccine (1)    COVID-19 Vaccine (3 -  season)       Wt Readings from Last 1 Encounters:   24 87.1 kg (192 lb)     BP Readings from Last 3 Encounters:   24 134/83   10/14/24 109/71   24 119/70     Pulse Readings from Last 1 Encounters:   24 99         Patient is currently accompanied by daughter & I have received verbal consent from Destiny R Saint to discuss any/all medical information while he/she is present in the room.    Home BP cuff present today:  no    Home medication list or bottles present today: no  - All medications were reviewed and updated with the patient today in office.    Forms for completion: no    Chest pain/ Shortness of breath: no    Surgery within the next month:  no      Depression Screening:  :     Depression: Not at risk (10/14/2024)    PHQ-2     PHQ-2 Score: 0          Coordination of Care Questionnaire:  :     \"Have you been to the ER, urgent care clinic since your last visit?  Hospitalized since your last visit?\"    NO    “Have you seen or consulted any other health care providers outside of [facility] since your last visit?”    NO     “Have you had a pap smear?”    NO    No cervical cancer screening on file     Click Here for Release of Records Request      --Kush Vaz CMA       ------------------------------------------------

## 2024-12-19 NOTE — PROGRESS NOTES
Destiny R Saint (:  2000) is a 24 y.o. female,Established patient, here for evaluation of the following chief complaint(s):  Annual Exam (Physical) and Immunizations (Flu shoot)         Assessment & Plan  Anxiety   Doing well on Zoloft, would like to increase to 50mg daily. New script sent in.    Orders:    Comprehensive Metabolic Panel; Future    Comprehensive Metabolic Panel      No follow-ups on file.       Subjective   Here to get check up while also bringing her baby in for [facility]. We discussed her mental health situation and medication regimen.        Review of Systems   All other systems reviewed and are negative.         Objective   Physical Exam  Constitutional:       General: She is not in acute distress.     Appearance: Normal appearance. She is not ill-appearing.   HENT:      Head: Normocephalic and atraumatic.      Right Ear: External ear normal.      Left Ear: External ear normal.      Nose: Nose normal.   Eyes:      General: No scleral icterus.        Right eye: No discharge.         Left eye: No discharge.      Extraocular Movements: Extraocular movements intact.      Conjunctiva/sclera: Conjunctivae normal.   Cardiovascular:      Rate and Rhythm: Normal rate and regular rhythm.      Pulses: Normal pulses.      Heart sounds: Normal heart sounds. No murmur heard.     No friction rub. No gallop.   Pulmonary:      Effort: Pulmonary effort is normal. No respiratory distress.      Breath sounds: Normal breath sounds. No stridor. No wheezing, rhonchi or rales.   Chest:      Chest wall: No tenderness.   Neurological:      General: No focal deficit present.      Mental Status: She is alert and oriented to person, place, and time.   Psychiatric:         Mood and Affect: Mood normal.         Behavior: Behavior normal.         Thought Content: Thought content normal.         Judgment: Judgment normal.            An electronic signature was used to authenticate this note.    --Chapo Ferreira DO

## 2024-12-26 NOTE — H&P
Department of Obstetrics and Gynecology  Attending Obstetrics History and Physical        CHIEF COMPLAINT:  watery discharge    HISTORY OF PRESENT ILLNESS:      The patient is a 23 y.o.  1 parity 0 at 24 and 3/7 weeks.  Patient presents c/o discharge x 3 weeks that started thick and mucousy and now is watery causing dampness in panties. No Gush or trickle. No vaginal bleeding. No contractions. Patient has occasional cramp in lower abdomen that occurs every couple of hours x several days. Good FM. ANC uncomplicated per patient. There are no records available from her primary OB , Dr Lisandro Doll.    DATES:    Estimated Due Date:  24      PAST OB HISTORY        Depression:  yes      Post-partum depression:  No      Diabetes:  No      Gestational Diabetes:  No      Thyroid Disease:  No      Chronic HTN:  No      Gestation HTN:  No      Pre-eclampsia:  No      Seizure disorder:  No      Asthma:  No      Clotting disorder:  No      :  No      Tubal ligation:  No      D & C:  No      Cerclage:  No      LEEP:  No      Myomectomy:  No    OB History    Para Term  AB Living   1             SAB IAB Ectopic Molar Multiple Live Births                    # Outcome Date GA Lbr Dennis/2nd Weight Sex Delivery Anes PTL Lv   1 Current                  Past Medical History:        Diagnosis Date    ADHD     Anxiety     Anxiety     Bipolar disorder (HCC)     Borderline personality disorder (HCC)     Depression     Headache     Multiple personality disorder (HCC)     PTSD (post-traumatic stress disorder)     Reactive attachment disorder      Past Surgical History:    No past surgical history on file.  Social History:    Non-smoker, no ETOH, No drugs  Family History:       Problem Relation Age of Onset    Depression Mother     Mental Illness Mother     Obesity Mother     Allergy (Severe) Sister     Asthma Sister      Medications Prior to Admission:  Medications Prior to Admission: omeprazole (PRILOSEC) 40 MG  Nutrition Assessment   Reason for consult/assessment: Enteral nutrition    Diagnosis, Labs, Medication, History: Reviewed    Pertinent nutrition history prior to admission: anemia, COPD, ETOH use                                 Oral diet order: NPO         Enteral nutrition: Held    Diet tolerance: NPO       Food allergies: None known    Anthropometrics Information  Usual weight: 48 kg  % Weight change: 0  Weight change significant: No       Estimated Needs     Calculated energy needs: 6808-8748  kcal              Calculated protein needs: 58-72  g     Calculated Fluid Needs: Per Provider               NFPE  Nutrition Focused Physical Exam  Physical Exam Completed: Yes (12/12/24 1238 : Bren Lala, RD)   Body Fat  Orbital region: Mild/Moderate  Cheek region (buccal fat pads): Mild/Moderate  Upper arm region (triceps/biceps): Unable to assess  Thoracic and lumbar region (rib/lower back/mid-axillary line): Unable to assess  Muscle Mass  Temporal region (temporalis muscle): Mild/Moderate  Collarbone region (clavicle bone, pectoralis major, trapezius muscle): Mild/Moderate  Shoulder region (deltoid muscle): Unable to assess  Scapular bone region (trapezius, supraspinatus, infraspinatus muscles): Unable to assess  Hand region: Mild/Moderate  Patellar region (quadriceps muscle): Mild/Moderate  Anterior thigh region (quadriceps muscle): Mild/Moderate  Posterior calf region (gastrocnemius muscle): Unable to assess                  12/9/24: Pt reports poor appetite and decreased PO intake. Admitted with cellulitis of Rt leg (recently had Rt hip surgery) and diarrhea (+ for C.diff). Seen by speech therapy and currently on mildly thickened liquids and easy to chew diet. Pt reports he has been having some anxiety which has affected appetite. Noted pt receiving PRN Ativan, Zofran, TUMS. Also receiving folic acid, iron, Colace. Encouraged small frequent meals, and discussed role of oral nutrition supplements - will trial magic  cup.     12/12/24: Pt POD 2, s/p exp lap with ~ 1L of ascitic fluid suctioned with no other findings. Per RN: try to extubate today, if not-start TF per her discussion with MD. RN reports 10 min p meds were given-she found pt with fluid pooling out of his mouth-will get a KUB.  BM x 1 in past 24 hrs.  ON IVF, hypernatremic, K and Mag low-replacement given. Will place order for trophic TF with RN to start only after OK from surgery.     12/13/24: Discussed nutrition plan with Dr. Acosta. Unable to start enteral nutrition-pt may go back to OR. Abd is very distended. Will begin TPN today. IVF to continue d/t low CO2-total vol of 125ml/hr between IVF/TPN today-anticipating this dc tomorrow and all fluid will be given via TPN. Mg and Kphos riders ordered this am.     12/14/24: TPN infusing. OR overnight for toxic lui colon, sub total colectomy and left in discontinuity. With wound vac. Plan to return to OR tomorrow. Having BM. NGT to suction. K replacement ordered. No longer on IVF. Discussed TPN with Dr. Valentin and [facility] pharmacist.     12/15/24: TPN infusing. Intubated and sedated on propofol, on pressor support. OR for ileostomy creation, with gas output. 5.5L wound vac output, mostly from ascites per RN. K and mag replaced. Discussed with [facility] pharmacist.     12/16/24: Pt remains intubated ~225 kcal from prop past 24 hours, and on pressor support. TPN infusing. Ileostomy with output and RD consulted to start trickle feeds. Discussed with [facility].     12/17/24: Remains intubated, sedated, on pressors. Tolerating trickle tube feeds at 15 ml/hr, ok to advance to goal rate per Surgery. +soft stool output from ileostomy. Will continue TPN until pt tolerating tube feeding at goal. Will lower TPN volume given edematous and Na trends. Discussed next TPN formula with [facility] pharmacist.     12/18/24: Extubated, off sedation. Pressor requirements decreased. TPN infusing and tube feedings now at goal rate. Having increased ostomy  output this morning (total of ~1L so far per RN). Total of 175 mL stool output documented yesterday. +C. Diff. Receiving Lasix and albumin today.   OK to discontinue TPN after current bag completed per Surgery.     12/20/24: Patient tolerating tube feeds at goal of 50 ml/hr per RN. Banatrol started per Trauma given high ostomy output previously. Ostomy output better controlled today per RN. SLP following.     12/23/24: TF has been infusing at goal. Abdomen distended this morning. TF on hold and KUB was ordered. Per RN, Banatrol use has been decreasing due to less loose stools - will discontinue Banatrol order for now as pt is also receiving Imodium. Noted pt also started on scheduled Lasix yesterday.     12/25/24: Abdominal distention improving and okay for trickle feeds today. Continues NPO per SLP. Ileostomy with output.     12/26/24: Pt with aspiration event this am. Intubated and sedated. Transferred to CCU. NPO. Giving Miralax d/t low ostomy output.   Treatment Plan/Interventions                                                                                              Coordination of nutrition care: Will follow for plan of care              Goals & Monitoring  Intervention: Coordination of nutrition care by a nutrition professional    Goal: Transition to enteral nutrition  Intervention goal status: Initiated  Time frame to achieve goal: 1-3 days    Dietitian will monitor: Biochemical data, medical tests, procedures          Nutrition Diagnosis/PES   Nutrition Diagnosis: Malnutrition Malnutrition in the context of chronic illness: Non-severe (moderate)   Related to: Diarrhea, Decreased intake  As evidenced by: Documented/reported poor oral intake  Malnutrition chronic; non-severe (moderate): Mild depletion of body fat, Mild depletion of muscle mass  Primary nutrition diagnosis status: Active nutrition diagnosis

## 2025-01-21 ENCOUNTER — TELEPHONE (OUTPATIENT)
Age: 25
End: 2025-01-21

## 2025-01-21 RX ORDER — LAMOTRIGINE 150 MG/1
150 TABLET ORAL 2 TIMES DAILY
Qty: 30 TABLET | Refills: 3 | Status: SHIPPED | OUTPATIENT
Start: 2025-01-21

## 2025-01-21 NOTE — TELEPHONE ENCOUNTER
Patient advised of MD sent prescription and will get it refilled from her PCP when needed next time.  Patient was asked about ae and states she will call back tomorrow to set that appointment up, she is too busy today.  Patient verbalized understanding.

## 2025-01-21 NOTE — TELEPHONE ENCOUNTER
24 year old patient last seen in the office on 9/19/2024 for pp visit    Patient needs ae.    Prescription last requested on 8/9/2024     Please advise      Patient was sent a my chart message of need for ae.    Thank you

## 2025-02-18 ENCOUNTER — OFFICE VISIT (OUTPATIENT)
Age: 25
End: 2025-02-18
Payer: COMMERCIAL

## 2025-02-18 VITALS
BODY MASS INDEX: 29.29 KG/M2 | WEIGHT: 187 LBS | SYSTOLIC BLOOD PRESSURE: 104 MMHG | HEART RATE: 81 BPM | DIASTOLIC BLOOD PRESSURE: 69 MMHG

## 2025-02-18 DIAGNOSIS — Z12.4 CERVICAL CANCER SCREENING: ICD-10-CM

## 2025-02-18 DIAGNOSIS — Z11.3 VENEREAL DISEASE SCREENING: ICD-10-CM

## 2025-02-18 DIAGNOSIS — Z01.419 ENCOUNTER FOR GYNECOLOGICAL EXAMINATION: Primary | ICD-10-CM

## 2025-02-18 PROCEDURE — 99395 PREV VISIT EST AGE 18-39: CPT | Performed by: OBSTETRICS & GYNECOLOGY

## 2025-02-18 RX ORDER — DROSPIRENONE AND ETHINYL ESTRADIOL 0.02-3(28)
1 KIT ORAL DAILY
Qty: 3 PACKET | Refills: 4 | Status: SHIPPED | OUTPATIENT
Start: 2025-02-18 | End: 2025-05-13

## 2025-02-18 NOTE — PROGRESS NOTES
ANNUAL EXAM AGES 18-39    History:  Destiny R Saint is a 24 y.o. year old  White (non-) female   Patient's last menstrual period was 2025.    She presents for her annual checkup.   Nurse Notes:  Patient's last menstrual period was 2025.  Her periods are moderate in flow and usually regular with a 26-32 day interval with 3-7 day duration.  She does not have dysmenorrhea.  Problems: problems - discuss pap from 2023 that was HPV positive  Birth Control: OCP (estrogen/progesterone).  Last Pap: no pap history due to age  -------------------------------------------------------------------------------------------------------------------  History of Present Illness  The patient presents for a well-woman exam.    She reports no significant health concerns over the past year. Her menstrual cycles are regular, with a duration of 5 to 7 days while on oral contraceptives, a notable improvement from the previous 7 to 10-day duration without medication. She has not experienced any yeast or bladder infections. 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    She takes Toradol for hypermobility issues, which cause significant pain.    She is on hydroxyzine for anxiety and has a doctor's appointment for that tomorrow.    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    FAMILY HISTORY  Her mother had precancerous cervical cancer. Her great-great-grandmother had breast cancer. She does not have much information about her father's side of the family.    MEDICATIONS  Zoloft, Lamictal, Toradol, hydroxyzine    Medical History:  Problem List:  Patient Active Problem List    Diagnosis Date Noted    Anxiety 2024    Encounter for elective induction of labor 2024    40 weeks gestation of pregnancy 2024    GBS carrier 2024    Anemia complicating pregnancy in third trimester 2024    Other iron

## 2025-02-18 NOTE — PROGRESS NOTES
Destiny R Saint is a 24 y.o. female returns for an annual exam     Chief Complaint   Patient presents with    Annual Exam       Patient's last menstrual period was 01/31/2025.  Her periods are moderate in flow and usually regular with a 26-32 day interval with 3-7 day duration.  She does not have dysmenorrhea.  Problems: problems - discuss pap from 12/2023 that was HPV positive  Birth Control: OCP (estrogen/progesterone).  Last Pap: no pap history due to age      1. Have you been to the ER, urgent care clinic, or hospitalized since your last visit? No    2. Have you seen or consulted any other health care providers outside of the [facility] since your last visit? No    Examination chaperoned by Martha Ernst LPN.

## 2025-02-25 LAB
., LABCORP: NORMAL
C TRACH RRNA CVX QL NAA+PROBE: NEGATIVE
CYTOLOGIST CVX/VAG CYTO: NORMAL
CYTOLOGY CVX/VAG DOC CYTO: NORMAL
CYTOLOGY CVX/VAG DOC THIN PREP: NORMAL
DX ICD CODE: NORMAL
N GONORRHOEA RRNA CVX QL NAA+PROBE: NEGATIVE
OTHER STN SPEC: NORMAL
SERVICE CMNT-IMP: NORMAL
STAT OF ADQ CVX/VAG CYTO-IMP: NORMAL
T VAGINALIS RRNA SPEC QL NAA+PROBE: NEGATIVE

## 2025-03-05 RX ORDER — AMITRIPTYLINE HYDROCHLORIDE 10 MG/1
10 TABLET ORAL NIGHTLY
Qty: 30 TABLET | Refills: 0 | Status: SHIPPED | OUTPATIENT
Start: 2025-03-05

## 2025-04-08 ENCOUNTER — TELEMEDICINE (OUTPATIENT)
Facility: CLINIC | Age: 25
End: 2025-04-08
Payer: COMMERCIAL

## 2025-04-08 DIAGNOSIS — M25.562 CHRONIC PAIN OF LEFT KNEE: Primary | ICD-10-CM

## 2025-04-08 DIAGNOSIS — G89.29 CHRONIC PAIN OF LEFT KNEE: Primary | ICD-10-CM

## 2025-04-08 PROCEDURE — 99214 OFFICE O/P EST MOD 30 MIN: CPT | Performed by: NURSE PRACTITIONER

## 2025-04-08 SDOH — ECONOMIC STABILITY: TRANSPORTATION INSECURITY
IN THE PAST 12 MONTHS, HAS LACK OF TRANSPORTATION KEPT YOU FROM MEETINGS, WORK, OR FROM GETTING THINGS NEEDED FOR DAILY LIVING?: NO

## 2025-04-08 SDOH — ECONOMIC STABILITY: TRANSPORTATION INSECURITY
IN THE PAST 12 MONTHS, HAS THE LACK OF TRANSPORTATION KEPT YOU FROM MEDICAL APPOINTMENTS OR FROM GETTING MEDICATIONS?: NO

## 2025-04-08 SDOH — ECONOMIC STABILITY: FOOD INSECURITY: WITHIN THE PAST 12 MONTHS, THE FOOD YOU BOUGHT JUST DIDN'T LAST AND YOU DIDN'T HAVE MONEY TO GET MORE.: SOMETIMES TRUE

## 2025-04-08 SDOH — ECONOMIC STABILITY: INCOME INSECURITY: IN THE LAST 12 MONTHS, WAS THERE A TIME WHEN YOU WERE NOT ABLE TO PAY THE MORTGAGE OR RENT ON TIME?: NO

## 2025-04-08 SDOH — ECONOMIC STABILITY: FOOD INSECURITY: WITHIN THE PAST 12 MONTHS, YOU WORRIED THAT YOUR FOOD WOULD RUN OUT BEFORE YOU GOT MONEY TO BUY MORE.: SOMETIMES TRUE

## 2025-04-08 ASSESSMENT — PATIENT HEALTH QUESTIONNAIRE - PHQ9
5. POOR APPETITE OR OVEREATING: SEVERAL DAYS
1. LITTLE INTEREST OR PLEASURE IN DOING THINGS: SEVERAL DAYS
9. THOUGHTS THAT YOU WOULD BE BETTER OFF DEAD, OR OF HURTING YOURSELF: NOT AT ALL
10. IF YOU CHECKED OFF ANY PROBLEMS, HOW DIFFICULT HAVE THESE PROBLEMS MADE IT FOR YOU TO DO YOUR WORK, TAKE CARE OF THINGS AT HOME, OR GET ALONG WITH OTHER PEOPLE: EXTREMELY DIFFICULT
9. THOUGHTS THAT YOU WOULD BE BETTER OFF DEAD, OR OF HURTING YOURSELF: NOT AT ALL
2. FEELING DOWN, DEPRESSED OR HOPELESS: SEVERAL DAYS
SUM OF ALL RESPONSES TO PHQ QUESTIONS 1-9: 11
SUM OF ALL RESPONSES TO PHQ QUESTIONS 1-9: 11
3. TROUBLE FALLING OR STAYING ASLEEP: NEARLY EVERY DAY
SUM OF ALL RESPONSES TO PHQ QUESTIONS 1-9: 11
8. MOVING OR SPEAKING SO SLOWLY THAT OTHER PEOPLE COULD HAVE NOTICED. OR THE OPPOSITE, BEING SO FIGETY OR RESTLESS THAT YOU HAVE BEEN MOVING AROUND A LOT MORE THAN USUAL: NOT AT ALL
4. FEELING TIRED OR HAVING LITTLE ENERGY: NEARLY EVERY DAY
8. MOVING OR SPEAKING SO SLOWLY THAT OTHER PEOPLE COULD HAVE NOTICED. OR THE OPPOSITE - BEING SO FIDGETY OR RESTLESS THAT YOU HAVE BEEN MOVING AROUND A LOT MORE THAN USUAL: NOT AT ALL
6. FEELING BAD ABOUT YOURSELF - OR THAT YOU ARE A FAILURE OR HAVE LET YOURSELF OR YOUR FAMILY DOWN: SEVERAL DAYS
10. IF YOU CHECKED OFF ANY PROBLEMS, HOW DIFFICULT HAVE THESE PROBLEMS MADE IT FOR YOU TO DO YOUR WORK, TAKE CARE OF THINGS AT HOME, OR GET ALONG WITH OTHER PEOPLE: EXTREMELY DIFFICULT
3. TROUBLE FALLING OR STAYING ASLEEP: NEARLY EVERY DAY
7. TROUBLE CONCENTRATING ON THINGS, SUCH AS READING THE NEWSPAPER OR WATCHING TELEVISION: SEVERAL DAYS
2. FEELING DOWN, DEPRESSED OR HOPELESS: SEVERAL DAYS
4. FEELING TIRED OR HAVING LITTLE ENERGY: NEARLY EVERY DAY
5. POOR APPETITE OR OVEREATING: SEVERAL DAYS
7. TROUBLE CONCENTRATING ON THINGS, SUCH AS READING THE NEWSPAPER OR WATCHING TELEVISION: SEVERAL DAYS
SUM OF ALL RESPONSES TO PHQ QUESTIONS 1-9: 11
1. LITTLE INTEREST OR PLEASURE IN DOING THINGS: SEVERAL DAYS
6. FEELING BAD ABOUT YOURSELF - OR THAT YOU ARE A FAILURE OR HAVE LET YOURSELF OR YOUR FAMILY DOWN: SEVERAL DAYS
SUM OF ALL RESPONSES TO PHQ QUESTIONS 1-9: 11

## 2025-04-08 NOTE — PROGRESS NOTES
concern    Eunice MORALES Friend, QUEENIEN    
conducted with patient's (and/or legal guardian's) consent. Patient identification was verified, and a caregiver was present when appropriate.   The patient was located at Home: [address] [facility] [address]  [place] [address]  Provider was located at Home (Appt Dept State): [place]  Confirm you are appropriately licensed, registered, or certified to deliver care in the state where the patient is located as indicated above. If you are not or unsure, please re-schedule the visit: Yes, I confirm.       Tran Sheehan, APRN - CNP

## 2025-05-09 DIAGNOSIS — R10.31 RIGHT LOWER QUADRANT ABDOMINAL PAIN: Primary | ICD-10-CM

## 2025-05-10 ENCOUNTER — HOSPITAL ENCOUNTER (OUTPATIENT)
Facility: HOSPITAL | Age: 25
Discharge: HOME | End: 2025-05-13
Payer: COMMERCIAL

## 2025-05-10 DIAGNOSIS — R10.31 RIGHT LOWER QUADRANT ABDOMINAL PAIN: ICD-10-CM

## 2025-05-10 PROCEDURE — 76856 US EXAM PELVIC COMPLETE: CPT

## 2025-05-13 ENCOUNTER — RESULTS FOLLOW-UP (OUTPATIENT)
Facility: CLINIC | Age: 25
End: 2025-05-13

## 2025-05-19 ENCOUNTER — OFFICE VISIT (OUTPATIENT)
Age: 25
End: 2025-05-19
Payer: COMMERCIAL

## 2025-05-19 ENCOUNTER — TELEPHONE (OUTPATIENT)
Age: 25
End: 2025-05-19

## 2025-05-19 VITALS
BODY MASS INDEX: 31.3 KG/M2 | RESPIRATION RATE: 18 BRPM | OXYGEN SATURATION: 97 % | DIASTOLIC BLOOD PRESSURE: 62 MMHG | WEIGHT: 199.4 LBS | HEIGHT: 67 IN | HEART RATE: 88 BPM | SYSTOLIC BLOOD PRESSURE: 106 MMHG | TEMPERATURE: 97.8 F

## 2025-05-19 DIAGNOSIS — K40.90 LEFT INGUINAL HERNIA: Primary | ICD-10-CM

## 2025-05-19 PROCEDURE — 99203 OFFICE O/P NEW LOW 30 MIN: CPT | Performed by: SURGERY

## 2025-05-19 ASSESSMENT — PATIENT HEALTH QUESTIONNAIRE - PHQ9
2. FEELING DOWN, DEPRESSED OR HOPELESS: NOT AT ALL
SUM OF ALL RESPONSES TO PHQ QUESTIONS 1-9: 0
SUM OF ALL RESPONSES TO PHQ QUESTIONS 1-9: 0
1. LITTLE INTEREST OR PLEASURE IN DOING THINGS: NOT AT ALL
SUM OF ALL RESPONSES TO PHQ QUESTIONS 1-9: 0
SUM OF ALL RESPONSES TO PHQ QUESTIONS 1-9: 0

## 2025-05-19 NOTE — PROGRESS NOTES
Identified pt with two pt identifiers (name and ). Reviewed chart in preparation for visit and have obtained necessary documentation.    Destiny R Saint is a 24 y.o. female  Chief Complaint   Patient presents with    New Patient     Referred for evaluation of left inguinal hernia     /62 (BP Site: Right Upper Arm, Patient Position: Sitting, BP Cuff Size: Large Adult)   Pulse 88   Temp 97.8 °F (36.6 °C) (Oral)   Resp 18   Ht 1.702 m (5' 7\")   Wt 90.4 kg (199 lb 6.4 oz)   SpO2 97%   BMI 31.23 kg/m²     1. Have you been to the ER, urgent care clinic since your last visit?  Hospitalized since your last visit?no    2. Have you seen or consulted any other health care providers outside of the [facility] since your last visit?  Include any pap smears or colon screening. no

## 2025-05-19 NOTE — PROGRESS NOTES
Subjective:      Destiny R Saint is a 24 y.o. female who was self-referred for evaluation of left groin pain. Symptoms were first noted 4 weeks ago. Symptoms started after lifting her infant. Pain is sharp, intermittent, radiating to groin. Lump is not appreciated. Patient has no symptoms of  chronic cough, difficulty urinating; she notes intermittent constipation. Patient does not have previous hx of groin surgery.  She denies nausea, vomiting, fever, chills.  No previous abdominal surgery.     Past Medical History:   Diagnosis Date    Abnormal Pap smear of cervix 12/28/2023    ADHD     Anemia     Anxiety     Bipolar disorder ([facility])     Borderline personality disorder ([facility])     Depression     Headache     Hypermobility of joint     Migraines     PTSD (post-traumatic stress disorder)     Reactive attachment disorder     Vitamin D deficiency 02/13/2024    28     Patient Active Problem List    Diagnosis Date Noted    Left inguinal hernia 05/19/2025    Anxiety 08/09/2024    Encounter for elective induction of labor 08/06/2024    40 weeks gestation of pregnancy 08/06/2024    GBS carrier 07/11/2024    Anemia complicating pregnancy in third trimester 05/23/2024    Other iron deficiency anemias 05/23/2024    Anemia affecting pregnancy in third trimester 05/14/2024    ASCUS with positive high risk HPV cervical 01/25/2024    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    Personality disorder in adult ([facility]) 08/06/2023    Suicidal behavior without attempted self-injury 08/06/2023    Schizoaffective disorder ([facility]) 08/05/2023    Alcoholism in recovery ([facility]) 2019     Past Surgical History:   Procedure Laterality Date    WISDOM TOOTH EXTRACTION  02/13/2025     Family History   Problem Relation Age of Onset    Depression Mother     Mental Illness Mother     Obesity Mother     Asthma Mother     Diabetes Mother     Allergy (Severe) Sister     Asthma Sister      Social History     Socioeconomic History

## 2025-05-20 ENCOUNTER — ANESTHESIA EVENT (OUTPATIENT)
Facility: HOSPITAL | Age: 25
End: 2025-05-20
Payer: COMMERCIAL

## 2025-05-20 ENCOUNTER — TELEPHONE (OUTPATIENT)
Age: 25
End: 2025-05-20

## 2025-05-20 ENCOUNTER — PREP FOR PROCEDURE (OUTPATIENT)
Age: 25
End: 2025-05-20

## 2025-05-20 NOTE — TELEPHONE ENCOUNTER
Contacted patient regarding scheduling surgery with Dr. Akers. Offered the date of 05/21 and patient accepted. Informed patient that PAT will be reaching out as well as a surgical letter will be sent out, patient understood.

## 2025-05-21 ENCOUNTER — ANESTHESIA (OUTPATIENT)
Facility: HOSPITAL | Age: 25
End: 2025-05-21
Payer: COMMERCIAL

## 2025-05-21 ENCOUNTER — HOSPITAL ENCOUNTER (OUTPATIENT)
Facility: HOSPITAL | Age: 25
Setting detail: OUTPATIENT SURGERY
Discharge: HOME | End: 2025-05-21
Attending: SURGERY | Admitting: SURGERY
Payer: COMMERCIAL

## 2025-05-21 ENCOUNTER — ANCILLARY PROCEDURE (OUTPATIENT)
Facility: HOSPITAL | Age: 25
End: 2025-05-21
Attending: SURGERY
Payer: COMMERCIAL

## 2025-05-21 VITALS
TEMPERATURE: 97 F | SYSTOLIC BLOOD PRESSURE: 123 MMHG | HEART RATE: 76 BPM | OXYGEN SATURATION: 97 % | RESPIRATION RATE: 14 BRPM | DIASTOLIC BLOOD PRESSURE: 83 MMHG

## 2025-05-21 DIAGNOSIS — K40.90 LEFT INGUINAL HERNIA: Primary | ICD-10-CM

## 2025-05-21 LAB — HCG UR QL: NEGATIVE

## 2025-05-21 PROCEDURE — 3600000019 HC SURGERY ROBOT ADDTL 15MIN: Performed by: SURGERY

## 2025-05-21 PROCEDURE — C1781 MESH (IMPLANTABLE): HCPCS | Performed by: SURGERY

## 2025-05-21 PROCEDURE — 7100000001 HC PACU RECOVERY - ADDTL 15 MIN: Performed by: SURGERY

## 2025-05-21 PROCEDURE — 81025 URINE PREGNANCY TEST: CPT

## 2025-05-21 PROCEDURE — 6360000002 HC RX W HCPCS

## 2025-05-21 PROCEDURE — 3700000001 HC ADD 15 MINUTES (ANESTHESIA): Performed by: SURGERY

## 2025-05-21 PROCEDURE — 6370000000 HC RX 637 (ALT 250 FOR IP)

## 2025-05-21 PROCEDURE — 3600000009 HC SURGERY ROBOT BASE: Performed by: SURGERY

## 2025-05-21 PROCEDURE — 6370000000 HC RX 637 (ALT 250 FOR IP): Performed by: ANESTHESIOLOGY

## 2025-05-21 PROCEDURE — 2580000003 HC RX 258: Performed by: ANESTHESIOLOGY

## 2025-05-21 PROCEDURE — 6360000002 HC RX W HCPCS: Performed by: ANESTHESIOLOGY

## 2025-05-21 PROCEDURE — 2709999900 HC NON-CHARGEABLE SUPPLY: Performed by: SURGERY

## 2025-05-21 PROCEDURE — 6360000002 HC RX W HCPCS: Performed by: SURGERY

## 2025-05-21 PROCEDURE — 49650 LAP ING HERNIA REPAIR INIT: CPT | Performed by: SURGERY

## 2025-05-21 PROCEDURE — 2500000003 HC RX 250 WO HCPCS

## 2025-05-21 PROCEDURE — 7100000000 HC PACU RECOVERY - FIRST 15 MIN: Performed by: SURGERY

## 2025-05-21 PROCEDURE — 3700000000 HC ANESTHESIA ATTENDED CARE: Performed by: SURGERY

## 2025-05-21 PROCEDURE — S2900 ROBOTIC SURGICAL SYSTEM: HCPCS | Performed by: SURGERY

## 2025-05-21 DEVICE — 3DMAX MID ANATOMICAL MESH, 10 CM X 16 CM (4" X 6"), LARGE, LEFT
Type: IMPLANTABLE DEVICE | Site: INGUINAL | Status: FUNCTIONAL
Brand: 3DMAX

## 2025-05-21 RX ORDER — CEFAZOLIN SODIUM 1 G/3ML
INJECTION, POWDER, FOR SOLUTION INTRAMUSCULAR; INTRAVENOUS
Status: DISCONTINUED | OUTPATIENT
Start: 2025-05-21 | End: 2025-05-21 | Stop reason: SDUPTHER

## 2025-05-21 RX ORDER — SODIUM CHLORIDE 0.9 % (FLUSH) 0.9 %
5-40 SYRINGE (ML) INJECTION PRN
Status: DISCONTINUED | OUTPATIENT
Start: 2025-05-21 | End: 2025-05-21 | Stop reason: HOSPADM

## 2025-05-21 RX ORDER — LAMOTRIGINE 150 MG/1
150 TABLET ORAL 2 TIMES DAILY
Qty: 30 TABLET | Refills: 3 | OUTPATIENT
Start: 2025-05-21

## 2025-05-21 RX ORDER — OXYCODONE AND ACETAMINOPHEN 5; 325 MG/1; MG/1
1 TABLET ORAL EVERY 6 HOURS PRN
Qty: 20 TABLET | Refills: 0 | Status: SHIPPED | OUTPATIENT
Start: 2025-05-21 | End: 2025-05-26

## 2025-05-21 RX ORDER — PROCHLORPERAZINE EDISYLATE 5 MG/ML
5 INJECTION INTRAMUSCULAR; INTRAVENOUS
Status: DISCONTINUED | OUTPATIENT
Start: 2025-05-21 | End: 2025-05-21 | Stop reason: HOSPADM

## 2025-05-21 RX ORDER — PROPOFOL 10 MG/ML
INJECTION, EMULSION INTRAVENOUS
Status: DISCONTINUED | OUTPATIENT
Start: 2025-05-21 | End: 2025-05-21 | Stop reason: SDUPTHER

## 2025-05-21 RX ORDER — HYDRALAZINE HYDROCHLORIDE 20 MG/ML
10 INJECTION INTRAMUSCULAR; INTRAVENOUS
Status: DISCONTINUED | OUTPATIENT
Start: 2025-05-21 | End: 2025-05-21 | Stop reason: HOSPADM

## 2025-05-21 RX ORDER — ONDANSETRON 2 MG/ML
4 INJECTION INTRAMUSCULAR; INTRAVENOUS
Status: DISCONTINUED | OUTPATIENT
Start: 2025-05-21 | End: 2025-05-21 | Stop reason: HOSPADM

## 2025-05-21 RX ORDER — SODIUM CHLORIDE, SODIUM LACTATE, POTASSIUM CHLORIDE, CALCIUM CHLORIDE 600; 310; 30; 20 MG/100ML; MG/100ML; MG/100ML; MG/100ML
INJECTION, SOLUTION INTRAVENOUS CONTINUOUS
Status: DISCONTINUED | OUTPATIENT
Start: 2025-05-21 | End: 2025-05-21 | Stop reason: HOSPADM

## 2025-05-21 RX ORDER — ONDANSETRON 2 MG/ML
INJECTION INTRAMUSCULAR; INTRAVENOUS
Status: DISCONTINUED | OUTPATIENT
Start: 2025-05-21 | End: 2025-05-21 | Stop reason: SDUPTHER

## 2025-05-21 RX ORDER — LIDOCAINE HYDROCHLORIDE 10 MG/ML
1 INJECTION, SOLUTION EPIDURAL; INFILTRATION; INTRACAUDAL; PERINEURAL
Status: DISCONTINUED | OUTPATIENT
Start: 2025-05-21 | End: 2025-05-21 | Stop reason: HOSPADM

## 2025-05-21 RX ORDER — MIDAZOLAM HYDROCHLORIDE 2 MG/2ML
2 INJECTION, SOLUTION INTRAMUSCULAR; INTRAVENOUS PRN
Status: DISCONTINUED | OUTPATIENT
Start: 2025-05-21 | End: 2025-05-21 | Stop reason: HOSPADM

## 2025-05-21 RX ORDER — FENTANYL CITRATE 50 UG/ML
25 INJECTION, SOLUTION INTRAMUSCULAR; INTRAVENOUS EVERY 5 MIN PRN
Status: DISCONTINUED | OUTPATIENT
Start: 2025-05-21 | End: 2025-05-21 | Stop reason: HOSPADM

## 2025-05-21 RX ORDER — SUCCINYLCHOLINE/SOD CL,ISO/PF 200MG/10ML
SYRINGE (ML) INTRAVENOUS
Status: DISCONTINUED | OUTPATIENT
Start: 2025-05-21 | End: 2025-05-21 | Stop reason: SDUPTHER

## 2025-05-21 RX ORDER — ROCURONIUM BROMIDE 10 MG/ML
INJECTION, SOLUTION INTRAVENOUS
Status: DISCONTINUED | OUTPATIENT
Start: 2025-05-21 | End: 2025-05-21 | Stop reason: SDUPTHER

## 2025-05-21 RX ORDER — KETOROLAC TROMETHAMINE 30 MG/ML
INJECTION, SOLUTION INTRAMUSCULAR; INTRAVENOUS
Status: DISCONTINUED | OUTPATIENT
Start: 2025-05-21 | End: 2025-05-21 | Stop reason: SDUPTHER

## 2025-05-21 RX ORDER — ONDANSETRON 4 MG/1
4 TABLET, FILM COATED ORAL 3 TIMES DAILY PRN
Qty: 15 TABLET | Refills: 0 | Status: SHIPPED | OUTPATIENT
Start: 2025-05-21

## 2025-05-21 RX ORDER — MIDAZOLAM HYDROCHLORIDE 1 MG/ML
INJECTION, SOLUTION INTRAMUSCULAR; INTRAVENOUS
Status: DISCONTINUED | OUTPATIENT
Start: 2025-05-21 | End: 2025-05-21 | Stop reason: SDUPTHER

## 2025-05-21 RX ORDER — ACETAMINOPHEN 500 MG
1000 TABLET ORAL ONCE
Status: COMPLETED | OUTPATIENT
Start: 2025-05-21 | End: 2025-05-21

## 2025-05-21 RX ORDER — POLYETHYLENE GLYCOL 3350 17 G/17G
17 POWDER, FOR SOLUTION ORAL DAILY
Qty: 340 G | Refills: 0 | Status: SHIPPED | OUTPATIENT
Start: 2025-05-21 | End: 2025-06-10

## 2025-05-21 RX ORDER — FENTANYL CITRATE 50 UG/ML
100 INJECTION, SOLUTION INTRAMUSCULAR; INTRAVENOUS
Status: DISCONTINUED | OUTPATIENT
Start: 2025-05-21 | End: 2025-05-21 | Stop reason: HOSPADM

## 2025-05-21 RX ORDER — BUPIVACAINE HYDROCHLORIDE 5 MG/ML
INJECTION, SOLUTION EPIDURAL; INTRACAUDAL; PERINEURAL PRN
Status: DISCONTINUED | OUTPATIENT
Start: 2025-05-21 | End: 2025-05-21 | Stop reason: HOSPADM

## 2025-05-21 RX ORDER — SODIUM CHLORIDE 9 MG/ML
INJECTION, SOLUTION INTRAVENOUS PRN
Status: DISCONTINUED | OUTPATIENT
Start: 2025-05-21 | End: 2025-05-21 | Stop reason: HOSPADM

## 2025-05-21 RX ORDER — DEXAMETHASONE SODIUM PHOSPHATE 4 MG/ML
INJECTION, SOLUTION INTRA-ARTICULAR; INTRALESIONAL; INTRAMUSCULAR; INTRAVENOUS; SOFT TISSUE
Status: DISCONTINUED | OUTPATIENT
Start: 2025-05-21 | End: 2025-05-21 | Stop reason: SDUPTHER

## 2025-05-21 RX ORDER — SODIUM CHLORIDE 0.9 % (FLUSH) 0.9 %
5-40 SYRINGE (ML) INJECTION EVERY 12 HOURS SCHEDULED
Status: DISCONTINUED | OUTPATIENT
Start: 2025-05-21 | End: 2025-05-21 | Stop reason: HOSPADM

## 2025-05-21 RX ORDER — NALOXONE HYDROCHLORIDE 0.4 MG/ML
INJECTION, SOLUTION INTRAMUSCULAR; INTRAVENOUS; SUBCUTANEOUS PRN
Status: DISCONTINUED | OUTPATIENT
Start: 2025-05-21 | End: 2025-05-21 | Stop reason: HOSPADM

## 2025-05-21 RX ORDER — DEXMEDETOMIDINE HYDROCHLORIDE 100 UG/ML
INJECTION, SOLUTION INTRAVENOUS
Status: DISCONTINUED | OUTPATIENT
Start: 2025-05-21 | End: 2025-05-21 | Stop reason: SDUPTHER

## 2025-05-21 RX ORDER — SCOPOLAMINE 1 MG/3D
PATCH, EXTENDED RELEASE TRANSDERMAL
Status: DISCONTINUED | OUTPATIENT
Start: 2025-05-21 | End: 2025-05-21 | Stop reason: SDUPTHER

## 2025-05-21 RX ORDER — FENTANYL CITRATE 50 UG/ML
INJECTION, SOLUTION INTRAMUSCULAR; INTRAVENOUS
Status: DISCONTINUED | OUTPATIENT
Start: 2025-05-21 | End: 2025-05-21 | Stop reason: SDUPTHER

## 2025-05-21 RX ORDER — GLYCOPYRROLATE 0.2 MG/ML
INJECTION INTRAMUSCULAR; INTRAVENOUS
Status: DISCONTINUED | OUTPATIENT
Start: 2025-05-21 | End: 2025-05-21 | Stop reason: SDUPTHER

## 2025-05-21 RX ORDER — LIDOCAINE HYDROCHLORIDE 20 MG/ML
INJECTION, SOLUTION EPIDURAL; INFILTRATION; INTRACAUDAL; PERINEURAL
Status: DISCONTINUED | OUTPATIENT
Start: 2025-05-21 | End: 2025-05-21 | Stop reason: SDUPTHER

## 2025-05-21 RX ORDER — OXYCODONE HYDROCHLORIDE 5 MG/1
5 TABLET ORAL
Status: COMPLETED | OUTPATIENT
Start: 2025-05-21 | End: 2025-05-21

## 2025-05-21 RX ORDER — ONDANSETRON 2 MG/ML
4 INJECTION INTRAMUSCULAR; INTRAVENOUS AS NEEDED
Status: DISCONTINUED | OUTPATIENT
Start: 2025-05-21 | End: 2025-05-21 | Stop reason: HOSPADM

## 2025-05-21 RX ORDER — HYDROMORPHONE HYDROCHLORIDE 1 MG/ML
0.5 INJECTION, SOLUTION INTRAMUSCULAR; INTRAVENOUS; SUBCUTANEOUS EVERY 5 MIN PRN
Status: DISCONTINUED | OUTPATIENT
Start: 2025-05-21 | End: 2025-05-21 | Stop reason: HOSPADM

## 2025-05-21 RX ADMIN — MIDAZOLAM 3 MG: 1 INJECTION INTRAMUSCULAR; INTRAVENOUS at 11:36

## 2025-05-21 RX ADMIN — OXYCODONE 5 MG: 5 TABLET ORAL at 13:44

## 2025-05-21 RX ADMIN — FENTANYL CITRATE 25 MCG: 50 INJECTION INTRAMUSCULAR; INTRAVENOUS at 13:28

## 2025-05-21 RX ADMIN — HYDROMORPHONE HYDROCHLORIDE 0.5 MG: 1 INJECTION, SOLUTION INTRAMUSCULAR; INTRAVENOUS; SUBCUTANEOUS at 13:06

## 2025-05-21 RX ADMIN — KETOROLAC TROMETHAMINE 15 MG: 30 INJECTION, SOLUTION INTRAMUSCULAR; INTRAVENOUS at 12:44

## 2025-05-21 RX ADMIN — DEXMEDETOMIDINE 4 MCG: 100 INJECTION, SOLUTION, CONCENTRATE INTRAVENOUS at 12:38

## 2025-05-21 RX ADMIN — ONDANSETRON 4 MG: 2 INJECTION, SOLUTION INTRAMUSCULAR; INTRAVENOUS at 12:44

## 2025-05-21 RX ADMIN — DEXMEDETOMIDINE 2 MCG: 100 INJECTION, SOLUTION, CONCENTRATE INTRAVENOUS at 12:54

## 2025-05-21 RX ADMIN — DEXAMETHASONE SODIUM PHOSPHATE 8 MG: 4 INJECTION INTRA-ARTICULAR; INTRALESIONAL; INTRAMUSCULAR; INTRAVENOUS; SOFT TISSUE at 11:48

## 2025-05-21 RX ADMIN — MIDAZOLAM 1 MG: 1 INJECTION INTRAMUSCULAR; INTRAVENOUS at 11:40

## 2025-05-21 RX ADMIN — PROPOFOL 200 MG: 10 INJECTION, EMULSION INTRAVENOUS at 11:43

## 2025-05-21 RX ADMIN — FENTANYL CITRATE 75 MCG: 50 INJECTION INTRAMUSCULAR; INTRAVENOUS at 11:43

## 2025-05-21 RX ADMIN — PROPOFOL 30 MCG/KG/MIN: 10 INJECTION, EMULSION INTRAVENOUS at 11:50

## 2025-05-21 RX ADMIN — FENTANYL CITRATE 25 MCG: 50 INJECTION INTRAMUSCULAR; INTRAVENOUS at 11:58

## 2025-05-21 RX ADMIN — CEFAZOLIN 2 G: 330 INJECTION, POWDER, FOR SOLUTION INTRAMUSCULAR; INTRAVENOUS at 11:54

## 2025-05-21 RX ADMIN — ROCURONIUM BROMIDE 20 MG: 10 INJECTION, SOLUTION INTRAVENOUS at 11:58

## 2025-05-21 RX ADMIN — SCOPALAMINE 1 PATCH: 1 PATCH, EXTENDED RELEASE TRANSDERMAL at 11:34

## 2025-05-21 RX ADMIN — FENTANYL CITRATE 25 MCG: 50 INJECTION INTRAMUSCULAR; INTRAVENOUS at 13:40

## 2025-05-21 RX ADMIN — ROCURONIUM BROMIDE 5 MG: 10 INJECTION, SOLUTION INTRAVENOUS at 11:43

## 2025-05-21 RX ADMIN — SODIUM CHLORIDE, SODIUM LACTATE, POTASSIUM CHLORIDE, AND CALCIUM CHLORIDE: .6; .31; .03; .02 INJECTION, SOLUTION INTRAVENOUS at 10:46

## 2025-05-21 RX ADMIN — ACETAMINOPHEN 1000 MG: 500 TABLET ORAL at 10:26

## 2025-05-21 RX ADMIN — HYDROMORPHONE HYDROCHLORIDE 0.5 MG: 1 INJECTION, SOLUTION INTRAMUSCULAR; INTRAVENOUS; SUBCUTANEOUS at 12:41

## 2025-05-21 RX ADMIN — LIDOCAINE HYDROCHLORIDE 100 MG: 20 INJECTION, SOLUTION EPIDURAL; INFILTRATION; INTRACAUDAL; PERINEURAL at 11:43

## 2025-05-21 RX ADMIN — SUGAMMADEX 200 MG: 100 INJECTION, SOLUTION INTRAVENOUS at 12:50

## 2025-05-21 RX ADMIN — ROCURONIUM BROMIDE 25 MG: 10 INJECTION, SOLUTION INTRAVENOUS at 11:50

## 2025-05-21 RX ADMIN — Medication 100 MG: at 11:44

## 2025-05-21 RX ADMIN — DEXMEDETOMIDINE 4 MCG: 100 INJECTION, SOLUTION, CONCENTRATE INTRAVENOUS at 11:58

## 2025-05-21 RX ADMIN — GLYCOPYRROLATE 0.1 MG: 0.2 INJECTION INTRAMUSCULAR; INTRAVENOUS at 11:54

## 2025-05-21 ASSESSMENT — PAIN SCALES - GENERAL
PAINLEVEL_OUTOF10: 0
PAINLEVEL_OUTOF10: 7
PAINLEVEL_OUTOF10: 4
PAINLEVEL_OUTOF10: 2

## 2025-05-21 ASSESSMENT — PAIN DESCRIPTION - LOCATION: LOCATION: ABDOMEN

## 2025-05-21 NOTE — ANESTHESIA POSTPROCEDURE EVALUATION
Post-Anesthesia Evaluation and Assessment    Patient: Destiny R Saint MRN: [id number]  SSN: [id number]    YOB: 2000  Age: 24 y.o.  Sex: female      I have evaluated the patient and they are stable and ready for discharge from the PACU.     Cardiovascular Function/Vital Signs  Visit Vitals  /80   Pulse 71   Temp 97 °F (36.1 °C) (Axillary)   Resp 14   SpO2 100%       Patient is status post General anesthesia for Procedure(s):  ROBOTIC ASSISTED LAPAROSCOPIC LEFT INGUINAL HERNIA REPAIR WITH MESH.    Nausea/Vomiting: None    Postoperative hydration reviewed and adequate.    Pain:  Managed    Neurological Status:   At baseline    Mental Status, Level of Consciousness: Alert and  oriented to person, place, and time    Pulmonary Status:   Adequate oxygenation and airway patent    Complications related to anesthesia: None    Post-anesthesia assessment completed. No concerns    Signed By: Mark Quiñones MD     May 21, 2025

## 2025-05-21 NOTE — OP NOTE
[address]  [address]                            OPERATIVE REPORT      PATIENT NAME: SAINT, DESTINY R              : 2000  MED REC NO: [id number]                       ROOM: OR  ACCOUNT NO: [id number]                       ADMIT DATE: 2025  PROVIDER: Fabian Akers MD    DATE OF SERVICE:  2025    PREOPERATIVE DIAGNOSES:  Left inguinal hernia.    POSTOPERATIVE DIAGNOSES:       1. Indirect left inguinal hernia.     2. Left ovarian functional cyst.    PROCEDURES PERFORMED:  Robotic-assisted laparoscopic left inguinal hernia repair with mesh (CPT 74432).    SURGEON:  Fabian Akers MD    ASSISTANT:  Wendy Villatoro SA    ANESTHESIA:  General endotracheal.    DRAINS:  None.    SPONGE COUNT:  Correct.    NEEDLE COUNT:  Correct.    ESTIMATED BLOOD LOSS:  30 mL.    SPECIMENS REMOVED:  None.    COMPLICATIONS:  None.    IMPLANTS:  Large, medium weight Bard 3D anatomic mesh.    INDICATIONS:  The patient is a 24-year-old white female with recent development of severe left groin pain after lifting her infant.  Pain is sharp, intermittent, and radiating to her groin.  She does not appreciate a lump, but ultrasound performed recently reveals a fat containing left inguinal hernia.  On exam, she had left groin pain with palpation.  Her findings are consistent with symptomatic left inguinal hernia.  She was taken to the operating room today for robotic-assisted laparoscopic repair with mesh.    INTRAOPERATIVE FINDINGS:       1. Indirect left inguinal hernia; functional left ovarian cyst.     2. Satisfactory mesh repair using large, medium weight Bard 3D anatomic mesh.    DESCRIPTION OF PROCEDURE:  The patient was identified as the correct patient in the preoperative holding area and informed consent was confirmed.  After answering the patient's remaining questions and performing site verification, she was taken to the operating room and

## 2025-05-21 NOTE — BRIEF OP NOTE
Brief Postoperative Note      Patient: Destiny R Saint  YOB: 2000  MRN: [id number]    Date of Procedure: 5/21/2025    Pre-Op Diagnosis Codes:      * Left inguinal hernia [K40.90]    Post-Op Diagnosis:  Indirect left inguinal hernia; functional left ovarian cyst       Procedure(s):  ROBOTIC ASSISTED LAPAROSCOPIC LEFT INGUINAL HERNIA REPAIR WITH MESH    Surgeon(s):  Fabian Akers MD    Assistant:  Surgical Assistant: Wendy Corado    Anesthesia: General    Estimated Blood Loss (mL): Minimal    Complications: None    Specimens:   * No specimens in log *    Implants:  Implant Name Type Inv. Item Serial No.  Lot No. LRB No. Used Action   MESH CS LEFT LARGE 10CM X 16CM - SN/A  MESH CS LEFT LARGE 10CM X 16CM N/A BARD DAVOL-WD [id number] Left 1 Implanted         Drains: * No LDAs found *    Findings:  Infection Present At Time Of Surgery (PATOS) (choose all levels that have infection present):  No infection present  Other Findings: Indirect LIH; functional left ovarian cyst.    Electronically signed by Fabian Akers MD on 5/21/2025 at 12:50 PM

## 2025-05-21 NOTE — PERIOP NOTE
1400: Discharge instructions reviewed with patient's boyfriend. All questions answered.   1405:Pt wheeled to front of hospital for discharge . VSS.

## 2025-05-21 NOTE — ANESTHESIA PRE PROCEDURE
2024       Past Surgical History:        Procedure Laterality Date   • WISDOM TOOTH EXTRACTION  2025       Social History:    Social History     Tobacco Use   • Smoking status: Every Day     Current packs/day: 0.00     Types: Cigarettes, E-Cigarettes     Start date: 2018     Last attempt to quit: 2024     Years since quittin.3   • Smokeless tobacco: Current   Substance Use Topics   • Alcohol use: Yes     Alcohol/week: 4.0 standard drinks of alcohol     Types: 2 Cans of beer, 2 Shots of liquor per week     Comment: socially                                Ready to quit: Not Answered  Counseling given: Not Answered      Vital Signs (Current):   Vitals:    25 1023   BP: 113/75   Pulse: 76   Resp: 16   Temp: 98.3 °F (36.8 °C)   TempSrc: Oral   SpO2: 97%                                              BP Readings from Last 3 Encounters:   25 113/75   25 106/62   25 104/69       NPO Status: Time of last liquid consumption:                         Time of last solid consumption:                         Date of last liquid consumption: 25                        Date of last solid food consumption: 25    BMI:   Wt Readings from Last 3 Encounters:   25 90.4 kg (199 lb 6.4 oz)   25 84.8 kg (187 lb)   24 87.1 kg (192 lb)     There is no height or weight on file to calculate BMI.    CBC:   Lab Results   Component Value Date/Time    WBC 4.6 2024 11:46 AM    RBC 4.26 2024 11:46 AM    HGB 12.4 2024 11:46 AM    HCT 39.5 2024 11:46 AM    MCV 92.7 2024 11:46 AM    RDW 12.9 2024 11:46 AM     2024 11:46 AM       CMP:   Lab Results   Component Value Date/Time     2024 08:48 AM    K 3.7 2024 08:48 AM     2024 08:48 AM    CO2 23 2024 08:48 AM    BUN 13 2024 08:48 AM    CREATININE 0.97 2024 08:48 AM    GFRAA >60 2021 10:34 PM    AGRATIO 1.1 2023 
with patient.    Use of blood products discussed with patient whom consented to blood products.    Plan discussed with CRNA.                Mark Quiñones MD   5/21/2025

## 2025-05-21 NOTE — H&P
supple, no significant adenopathy  Lymphatics - no palpable lymphadenopathy  Chest - clear to auscultation, no wheezes, rales or rhonchi, symmetric air entry  Heart - normal rate, regular rhythm, normal S1, S2, no murmur  Abdomen -obese, nondistended, soft; NABS; no ventral hernia; tender, reducible left inguinal hernia  Neurological - alert, oriented, normal speech, no focal findings or movement disorder noted  Extremities - no pedal edema, no clubbing or cyanosis  Skin - normal coloration and turgor, no rashes, no suspicious skin lesions noted     Ultrasound: Fat-containing left inguinal hernia, tender to palpation.     Assessment:      Left inguinal hernia, tender to palpation; she desires to proceed with robotic assisted laparoscopic repair with mesh as soon as possible given severity of pain in left groin.     Plan:      1. Discussed possibility of incarceration, strangulation, enlargement in size over time, and the risk of emergency surgery in the face of strangulation.  Also discussed the risk of surgery including recurrence, bleeding, infection, open procedure, persistent symptoms; also reviewed the outpatient nature of the procedure and postoperative activity restrictions. The patient understands the risks, all questions were answered to the patient's satisfaction.  2. Patient has elected to proceed with surgical treatment.

## 2025-05-22 ENCOUNTER — TELEPHONE (OUTPATIENT)
Age: 25
End: 2025-05-22

## 2025-05-22 RX ORDER — LAMOTRIGINE 150 MG/1
150 TABLET ORAL 2 TIMES DAILY
Qty: 30 TABLET | Refills: 3 | OUTPATIENT
Start: 2025-05-22

## 2025-05-22 NOTE — TELEPHONE ENCOUNTER
Patient identified with two patient identifiers. Patient informed she can wear support binder if needed to comfort.  If any discomfort or issues she is aware to remove.  Patient expressed understanding agreed to return call if any other questions or concerns.

## 2025-05-23 RX ORDER — LAMOTRIGINE 150 MG/1
150 TABLET ORAL 2 TIMES DAILY
Qty: 30 TABLET | Refills: 3 | OUTPATIENT
Start: 2025-05-23

## 2025-05-27 ENCOUNTER — TELEPHONE (OUTPATIENT)
Age: 25
End: 2025-05-27

## 2025-05-27 ENCOUNTER — TELEPHONE (OUTPATIENT)
Facility: CLINIC | Age: 25
End: 2025-05-27

## 2025-05-27 RX ORDER — LAMOTRIGINE 150 MG/1
150 TABLET ORAL 2 TIMES DAILY
Qty: 30 TABLET | Refills: 1 | Status: SHIPPED | OUTPATIENT
Start: 2025-05-27

## 2025-05-27 RX ORDER — NITROFURANTOIN 25; 75 MG/1; MG/1
100 CAPSULE ORAL 2 TIMES DAILY
Qty: 20 CAPSULE | Refills: 0 | Status: SHIPPED | OUTPATIENT
Start: 2025-05-27 | End: 2025-06-06

## 2025-05-27 NOTE — TELEPHONE ENCOUNTER
I called the patient back after speaking to Dr Akers, He is sending a prescription over to her pharmacy to help with her bladder symptoms and I told her she can try heat to her abdomen as well. Pt in agreemtn.

## 2025-05-27 NOTE — TELEPHONE ENCOUNTER
I called the patient back and she said she can not stand up straight, she said things were getting better but on Sunday she went for a walk and then yesterday and today she can not stand up straight. She said she is having a \"stabbing\" pain in her bladder when she tries to pass her urine, no fever, no burning with urination. She is taking Oxycodone and Toradol for pain, she said she can not take NSAIDS, even though she is taking Toradol. I told her I will speak to Dr Akers and call her back. Pt in agreement.

## 2025-05-27 NOTE — TELEPHONE ENCOUNTER
Patient stated she is having a really hard time standing up straight since surgery and having a hard time urinating and is having a lot of pain.

## 2025-06-04 ENCOUNTER — TELEMEDICINE (OUTPATIENT)
Age: 25
End: 2025-06-04

## 2025-06-04 DIAGNOSIS — Z98.890 S/P INGUINAL HERNIA REPAIR: ICD-10-CM

## 2025-06-04 DIAGNOSIS — Z87.19 S/P INGUINAL HERNIA REPAIR: ICD-10-CM

## 2025-06-04 DIAGNOSIS — Z09 POSTOPERATIVE EXAMINATION: Primary | ICD-10-CM

## 2025-06-04 ASSESSMENT — PAIN SCALES - GENERAL: PAINLEVEL_OUTOF10: 0

## 2025-06-04 ASSESSMENT — PATIENT HEALTH QUESTIONNAIRE - PHQ9
2. FEELING DOWN, DEPRESSED OR HOPELESS: NOT AT ALL
1. LITTLE INTEREST OR PLEASURE IN DOING THINGS: NOT AT ALL
SUM OF ALL RESPONSES TO PHQ QUESTIONS 1-9: 0

## 2025-06-04 NOTE — PROGRESS NOTES
Destiny R Saint, was evaluated through a synchronous (real-time) audio-video encounter. The patient (or guardian if applicable) is aware that this is a billable service, which includes applicable co-pays. This Virtual Visit was conducted with patient's (and/or legal guardian's) consent. Patient identification was verified, and a caregiver was present when appropriate.   The patient was located at Home: [address]  [place] [address]  Provider was located at Facility (Appt Dept): [address]  [place] [address]  Confirm you are appropriately licensed, registered, or certified to deliver care in the state where the patient is located as indicated above. If you are not or unsure, please re-schedule the visit: Yes, I confirm.        Total time spent for this encounter: Not billed by time    --TERESITA Allred NP on 6/4/2025 at 11:15 AM    An electronic signature was used to authenticate this note.       CC: Post Operative state    Subjective:     Destiny R Saint is a 24 y.o. female presents for postop care 2 weeks s/p robotic-assisted laparoscopic left inguinal hernia repair with mesh.     Pt states she believes she is doing well postop.  Has some mild discomfort with movement in RLE/rt groin, but well tolerated without any pain medications.   Reports she has a Hx of gastritis which causes nausea sometimes, but no more nausea than normal. Takes omeprazole daily and tolerating regular diet.  States she plans on making an appt with PCP to discuss ongoing intermittent nausea.   Taking Miralax and having BM every other day which is normal for her.  Denies constipation or diarrhea.   Voiding without difficulty.  No fevers or chills.     Review of Systems:  A comprehensive review of systems was negative except for that written above      Ms. Saint has a reminder for a \"due or due soon\" health maintenance. I have asked that she contact her primary care provider for follow-up on this

## 2025-06-04 NOTE — PROGRESS NOTES
Identified pt with two pt identifiers (name and ). Reviewed chart in preparation for visit and have obtained necessary documentation.    Destiny R Saint is a 24 y.o. female  Chief Complaint   Patient presents with    Post-Op Check     2 weeks s/p ROBOTIC ASSISTED LAPAROSCOPIC LEFT INGUINAL HERNIA REPAIR WITH MESH      There were no vitals taken for this visit.    1. Have you been to the ER, urgent care clinic since your last visit?  Hospitalized since your last visit?no    2. Have you seen or consulted any other health care providers outside of the [facility] since your last visit?  Include any pap smears or colon screening. no

## 2025-06-26 DIAGNOSIS — N83.209 CYST OF OVARY, UNSPECIFIED LATERALITY: Primary | ICD-10-CM

## 2025-07-01 NOTE — PROGRESS NOTES
Destiny R Saint is a 25 y.o. female presents for a problem visit.      No LMP recorded.  Birth Control: OCP (estrogen/progesterone).  Last Pap: normal obtained 2/2025    1. Have you been to the ER, urgent care clinic, or hospitalized since your last visit? No    2. Have you seen or consulted any other health care providers outside of the [facility] since your last visit? No    Examination chaperoned by Shannon Ta LPN.

## 2025-07-03 ENCOUNTER — OFFICE VISIT (OUTPATIENT)
Age: 25
End: 2025-07-03
Payer: COMMERCIAL

## 2025-07-03 VITALS
OXYGEN SATURATION: 99 % | SYSTOLIC BLOOD PRESSURE: 109 MMHG | BODY MASS INDEX: 30.92 KG/M2 | WEIGHT: 197.4 LBS | RESPIRATION RATE: 16 BRPM | DIASTOLIC BLOOD PRESSURE: 73 MMHG | HEART RATE: 96 BPM

## 2025-07-03 DIAGNOSIS — N83.201 CYSTS OF BOTH OVARIES: Primary | ICD-10-CM

## 2025-07-03 DIAGNOSIS — N83.202 CYSTS OF BOTH OVARIES: Primary | ICD-10-CM

## 2025-07-03 PROBLEM — O99.013 ANEMIA COMPLICATING PREGNANCY IN THIRD TRIMESTER: Status: RESOLVED | Noted: 2024-05-23 | Resolved: 2025-07-03

## 2025-07-03 PROBLEM — R87.610 ASCUS WITH POSITIVE HIGH RISK HPV CERVICAL: Status: RESOLVED | Noted: 2024-01-25 | Resolved: 2025-07-03

## 2025-07-03 PROBLEM — Z3A.40 40 WEEKS GESTATION OF PREGNANCY: Status: RESOLVED | Noted: 2024-08-06 | Resolved: 2025-07-03

## 2025-07-03 PROBLEM — Z22.330 GBS CARRIER: Status: RESOLVED | Noted: 2024-07-11 | Resolved: 2025-07-03

## 2025-07-03 PROBLEM — Z34.90 ENCOUNTER FOR ELECTIVE INDUCTION OF LABOR: Status: RESOLVED | Noted: 2024-08-06 | Resolved: 2025-07-03

## 2025-07-03 PROBLEM — R87.810 ASCUS WITH POSITIVE HIGH RISK HPV CERVICAL: Status: RESOLVED | Noted: 2024-01-25 | Resolved: 2025-07-03

## 2025-07-03 PROCEDURE — 99213 OFFICE O/P EST LOW 20 MIN: CPT | Performed by: OBSTETRICS & GYNECOLOGY

## 2025-07-03 NOTE — PROGRESS NOTES
Destiny R Saint is a 25 y.o. female presents for a problem visit.      Patient's last menstrual period was 06/27/2025.  Birth Control: OCP (estrogen/progesterone).  Last Pap: normal obtained 2/2025    1. Have you been to the ER, urgent care clinic, or hospitalized since your last visit? No    2. Have you seen or consulted any other health care providers outside of the [facility] since your last visit? No    Examination chaperoned by Jeanette Marquez CMA.

## 2025-07-03 NOTE — PROGRESS NOTES
PELVIC PAIN EVALUATION      Destiny R Saint is 25 y.o. year old White (non-)  female who presents for evaluation of pelvic pain/ovarian cysts.     She was here for followup regarding recent US she had due to pain.  She was found to have several cysts on her ovaries and PCP advised coming in for eval.  She also had an inguinal hernia and did have that repaired.  Is feeling better.     I reviewed her US and images today.  Several small follicular cysts are noted bilaterally.  Cycles are regular.  She is on OCP      Problem List:  Patient Active Problem List    Diagnosis Date Noted    Left inguinal hernia 2025    Anxiety 2024    Other iron deficiency anemias 2024    Anemia affecting pregnancy in third trimester 2024     Overview Note:     8.7  Slow FE  24 8.0 -- Iron studies indicate deficiency.  To see hematology  Iron infusion   24 Hgb 9.7      Adjustment disorder with mixed disturbance of emotions and conduct 2023    Personality disorder in adult (HCC) 2023    Suicidal behavior without attempted self-injury 2023    Schizoaffective disorder (HCC) 2023    Alcoholism in recovery ([facility]) 2019     Overview Note:     Quit in        Past Medical History:   Diagnosis Date    Abnormal Pap smear of cervix 2023    ADHD     Anemia     Anxiety     ASCUS with positive high risk HPV cervical 2024    Repap pp  normal      Bipolar disorder (HCC)     Borderline personality disorder (HCC)     Depression     GBS carrier 2024    Headache     Hypermobility of joint     Migraines     PTSD (post-traumatic stress disorder)     Reactive attachment disorder     Vitamin D deficiency 2024    28     Past Surgical History:   Procedure Laterality Date    HERNIA REPAIR Left 2025    ROBOTIC ASSISTED LAPAROSCOPIC LEFT INGUINAL HERNIA REPAIR WITH MESH performed by Fabian Akers MD at [facility] MAIN OR    WISDOM TOOTH EXTRACTION  2025

## 2025-07-21 RX ORDER — HYDROXYZINE PAMOATE 25 MG/1
25 CAPSULE ORAL 3 TIMES DAILY PRN
Qty: 30 CAPSULE | Refills: 0 | Status: SHIPPED | OUTPATIENT
Start: 2025-07-21

## 2025-07-22 ENCOUNTER — TELEPHONE (OUTPATIENT)
Facility: CLINIC | Age: 25
End: 2025-07-22

## 2025-07-22 RX ORDER — KETOROLAC TROMETHAMINE 10 MG/1
10 TABLET, FILM COATED ORAL EVERY 6 HOURS PRN
Qty: 30 TABLET | Refills: 2 | Status: SHIPPED | OUTPATIENT
Start: 2025-07-22 | End: 2026-07-22

## 2025-07-22 NOTE — TELEPHONE ENCOUNTER
We received a fax refill request for Irena TRIANA Saint.  Please escribe ketorolac (TORADOL) 10 MG tablet  to their pharmacy.  The pharmacy is correct in the chart and they are requesting a ? day supply.

## 2025-09-02 ENCOUNTER — TELEMEDICINE (OUTPATIENT)
Facility: CLINIC | Age: 25
End: 2025-09-02
Payer: COMMERCIAL

## 2025-09-02 DIAGNOSIS — G43.E09 CHRONIC MIGRAINE WITH AURA WITHOUT STATUS MIGRAINOSUS, NOT INTRACTABLE: Primary | ICD-10-CM

## 2025-09-02 DIAGNOSIS — N91.2 AMENORRHEA: ICD-10-CM

## 2025-09-02 PROCEDURE — 99214 OFFICE O/P EST MOD 30 MIN: CPT | Performed by: NURSE PRACTITIONER

## 2025-09-02 RX ORDER — ATOGEPANT 10 MG/1
1 TABLET ORAL DAILY
Qty: 90 TABLET | Refills: 1 | Status: ACTIVE | OUTPATIENT
Start: 2025-09-02 | End: 2025-09-05

## 2025-09-02 RX ORDER — UBROGEPANT 50 MG/1
1 TABLET ORAL ONCE AS NEEDED
Qty: 30 TABLET | Refills: 2 | Status: ACTIVE | OUTPATIENT
Start: 2025-09-02 | End: 2025-09-05

## 2025-09-02 RX ORDER — GUAIFENESIN/DEXTROMETHORPHAN 100-10MG/5
1 SYRUP ORAL ONCE
Qty: 1 EACH | Refills: 1 | Status: SHIPPED | OUTPATIENT
Start: 2025-09-02 | End: 2025-09-02

## 2025-09-02 ASSESSMENT — PATIENT HEALTH QUESTIONNAIRE - PHQ9
SUM OF ALL RESPONSES TO PHQ QUESTIONS 1-9: 7
1. LITTLE INTEREST OR PLEASURE IN DOING THINGS: SEVERAL DAYS
9. THOUGHTS THAT YOU WOULD BE BETTER OFF DEAD, OR OF HURTING YOURSELF: NOT AT ALL
3. TROUBLE FALLING OR STAYING ASLEEP: SEVERAL DAYS
6. FEELING BAD ABOUT YOURSELF - OR THAT YOU ARE A FAILURE OR HAVE LET YOURSELF OR YOUR FAMILY DOWN: SEVERAL DAYS
7. TROUBLE CONCENTRATING ON THINGS, SUCH AS READING THE NEWSPAPER OR WATCHING TELEVISION: NOT AT ALL
4. FEELING TIRED OR HAVING LITTLE ENERGY: NEARLY EVERY DAY
SUM OF ALL RESPONSES TO PHQ QUESTIONS 1-9: 2
5. POOR APPETITE OR OVEREATING: NOT AT ALL
9. THOUGHTS THAT YOU WOULD BE BETTER OFF DEAD, OR OF HURTING YOURSELF: NOT AT ALL
8. MOVING OR SPEAKING SO SLOWLY THAT OTHER PEOPLE COULD HAVE NOTICED. OR THE OPPOSITE - BEING SO FIDGETY OR RESTLESS THAT YOU HAVE BEEN MOVING AROUND A LOT MORE THAN USUAL: NOT AT ALL
1. LITTLE INTEREST OR PLEASURE IN DOING THINGS: SEVERAL DAYS
SUM OF ALL RESPONSES TO PHQ QUESTIONS 1-9: 2
10. IF YOU CHECKED OFF ANY PROBLEMS, HOW DIFFICULT HAVE THESE PROBLEMS MADE IT FOR YOU TO DO YOUR WORK, TAKE CARE OF THINGS AT HOME, OR GET ALONG WITH OTHER PEOPLE: VERY DIFFICULT
5. POOR APPETITE OR OVEREATING: NOT AT ALL
6. FEELING BAD ABOUT YOURSELF - OR THAT YOU ARE A FAILURE OR HAVE LET YOURSELF OR YOUR FAMILY DOWN: SEVERAL DAYS
2. FEELING DOWN, DEPRESSED OR HOPELESS: SEVERAL DAYS
2. FEELING DOWN, DEPRESSED OR HOPELESS: SEVERAL DAYS
SUM OF ALL RESPONSES TO PHQ QUESTIONS 1-9: 7
8. MOVING OR SPEAKING SO SLOWLY THAT OTHER PEOPLE COULD HAVE NOTICED. OR THE OPPOSITE, BEING SO FIGETY OR RESTLESS THAT YOU HAVE BEEN MOVING AROUND A LOT MORE THAN USUAL: NOT AT ALL
SUM OF ALL RESPONSES TO PHQ QUESTIONS 1-9: 7
1. LITTLE INTEREST OR PLEASURE IN DOING THINGS: SEVERAL DAYS
2. FEELING DOWN, DEPRESSED OR HOPELESS: SEVERAL DAYS
10. IF YOU CHECKED OFF ANY PROBLEMS, HOW DIFFICULT HAVE THESE PROBLEMS MADE IT FOR YOU TO DO YOUR WORK, TAKE CARE OF THINGS AT HOME, OR GET ALONG WITH OTHER PEOPLE: VERY DIFFICULT
SUM OF ALL RESPONSES TO PHQ QUESTIONS 1-9: 2
4. FEELING TIRED OR HAVING LITTLE ENERGY: NEARLY EVERY DAY
SUM OF ALL RESPONSES TO PHQ QUESTIONS 1-9: 2
7. TROUBLE CONCENTRATING ON THINGS, SUCH AS READING THE NEWSPAPER OR WATCHING TELEVISION: NOT AT ALL
3. TROUBLE FALLING OR STAYING ASLEEP: SEVERAL DAYS

## 2025-09-02 ASSESSMENT — LIFESTYLE VARIABLES
HAVE YOU OR SOMEONE ELSE BEEN INJURED AS A RESULT OF YOUR DRINKING: NO
HAS A RELATIVE, FRIEND, DOCTOR, OR ANOTHER HEALTH PROFESSIONAL EXPRESSED CONCERN ABOUT YOUR DRINKING OR SUGGESTED YOU CUT DOWN: NO
HOW OFTEN DURING THE LAST YEAR HAVE YOU FAILED TO DO WHAT WAS NORMALLY EXPECTED FROM YOU BECAUSE OF DRINKING: NEVER
HAVE YOU OR SOMEONE ELSE BEEN INJURED AS A RESULT OF YOUR DRINKING: NO
HOW OFTEN DURING THE LAST YEAR HAVE YOU FOUND THAT YOU WERE NOT ABLE TO STOP DRINKING ONCE YOU HAD STARTED: NEVER
HOW OFTEN DURING THE LAST YEAR HAVE YOU HAD A FEELING OF GUILT OR REMORSE AFTER DRINKING: NEVER
HOW OFTEN DURING THE LAST YEAR HAVE YOU FAILED TO DO WHAT WAS NORMALLY EXPECTED FROM YOU BECAUSE OF DRINKING: NEVER
HOW OFTEN DURING THE LAST YEAR HAVE YOU HAD A FEELING OF GUILT OR REMORSE AFTER DRINKING: NEVER
HOW OFTEN DURING THE LAST YEAR HAVE YOU NEEDED AN ALCOHOLIC DRINK FIRST THING IN THE MORNING TO GET YOURSELF GOING AFTER A NIGHT OF HEAVY DRINKING: NEVER
HOW OFTEN DURING THE LAST YEAR HAVE YOU BEEN UNABLE TO REMEMBER WHAT HAPPENED THE NIGHT BEFORE BECAUSE YOU HAD BEEN DRINKING: NEVER
HOW OFTEN DURING THE LAST YEAR HAVE YOU NEEDED AN ALCOHOLIC DRINK FIRST THING IN THE MORNING TO GET YOURSELF GOING AFTER A NIGHT OF HEAVY DRINKING: NEVER
HAS A RELATIVE, FRIEND, DOCTOR, OR ANOTHER HEALTH PROFESSIONAL EXPRESSED CONCERN ABOUT YOUR DRINKING OR SUGGESTED YOU CUT DOWN: NO
HOW OFTEN DURING THE LAST YEAR HAVE YOU FOUND THAT YOU WERE NOT ABLE TO STOP DRINKING ONCE YOU HAD STARTED: NEVER
HOW OFTEN DURING THE LAST YEAR HAVE YOU BEEN UNABLE TO REMEMBER WHAT HAPPENED THE NIGHT BEFORE BECAUSE YOU HAD BEEN DRINKING: NEVER

## 2025-09-02 ASSESSMENT — ANXIETY QUESTIONNAIRES
5. BEING SO RESTLESS THAT IT IS HARD TO SIT STILL: NOT AT ALL
6. BECOMING EASILY ANNOYED OR IRRITABLE: NEARLY EVERY DAY
2. NOT BEING ABLE TO STOP OR CONTROL WORRYING: MORE THAN HALF THE DAYS
1. FEELING NERVOUS, ANXIOUS, OR ON EDGE: MORE THAN HALF THE DAYS
4. TROUBLE RELAXING: SEVERAL DAYS
4. TROUBLE RELAXING: SEVERAL DAYS
GAD7 TOTAL SCORE: 12
7. FEELING AFRAID AS IF SOMETHING AWFUL MIGHT HAPPEN: MORE THAN HALF THE DAYS
3. WORRYING TOO MUCH ABOUT DIFFERENT THINGS: MORE THAN HALF THE DAYS
5. BEING SO RESTLESS THAT IT IS HARD TO SIT STILL: NOT AT ALL
3. WORRYING TOO MUCH ABOUT DIFFERENT THINGS: MORE THAN HALF THE DAYS
6. BECOMING EASILY ANNOYED OR IRRITABLE: NEARLY EVERY DAY
1. FEELING NERVOUS, ANXIOUS, OR ON EDGE: MORE THAN HALF THE DAYS
IF YOU CHECKED OFF ANY PROBLEMS ON THIS QUESTIONNAIRE, HOW DIFFICULT HAVE THESE PROBLEMS MADE IT FOR YOU TO DO YOUR WORK, TAKE CARE OF THINGS AT HOME, OR GET ALONG WITH OTHER PEOPLE: VERY DIFFICULT
7. FEELING AFRAID AS IF SOMETHING AWFUL MIGHT HAPPEN: MORE THAN HALF THE DAYS
2. NOT BEING ABLE TO STOP OR CONTROL WORRYING: MORE THAN HALF THE DAYS
IF YOU CHECKED OFF ANY PROBLEMS ON THIS QUESTIONNAIRE, HOW DIFFICULT HAVE THESE PROBLEMS MADE IT FOR YOU TO DO YOUR WORK, TAKE CARE OF THINGS AT HOME, OR GET ALONG WITH OTHER PEOPLE: VERY DIFFICULT

## 2025-09-05 RX ORDER — METOPROLOL SUCCINATE 25 MG/1
25 TABLET, EXTENDED RELEASE ORAL DAILY
Qty: 90 TABLET | Refills: 0 | Status: SHIPPED | OUTPATIENT
Start: 2025-09-05

## 2025-09-05 RX ORDER — RIZATRIPTAN BENZOATE 10 MG/1
10 TABLET ORAL
Qty: 30 TABLET | Refills: 3 | Status: SHIPPED | OUTPATIENT
Start: 2025-09-05

## 2025-09-05 RX ORDER — TOPIRAMATE 25 MG/1
25 TABLET, FILM COATED ORAL 2 TIMES DAILY
Qty: 30 TABLET | Refills: 1 | Status: SHIPPED | OUTPATIENT
Start: 2025-09-05 | End: 2025-09-05

## (undated) DEVICE — SYRINGE MED 10ML LUERLOCK TIP W/O SFTY DISP

## (undated) DEVICE — SEAL

## (undated) DEVICE — GENERAL LAPAROSCOPY - SMH: Brand: MEDLINE INDUSTRIES, INC.

## (undated) DEVICE — GLOVE SURG SZ 75 L1212IN FNGR THK138MIL BRN LTX FREE

## (undated) DEVICE — LIQUIBAND RAPID ADHESIVE 36/CS 0.8ML: Brand: MEDLINE

## (undated) DEVICE — SOLUTION IRRIG 1000ML 09% SOD CHL USP PIC PLAS CONTAINER

## (undated) DEVICE — SUTURE VICRYL + 3-0 VLT BRN SH NDL VC316H

## (undated) DEVICE — PACK,BASIC,SIRUS,V: Brand: MEDLINE

## (undated) DEVICE — SUTURE MONOCRYL + SZ 4-0 L27IN ABSRB UD L19MM PS-2 3/8 CIR MCP426H

## (undated) DEVICE — LAPAROSCOPIC TROCAR SLEEVE/SINGLE USE: Brand: KII® OPTICAL ACCESS SYSTEM

## (undated) DEVICE — SYRINGE MED 30ML STD CLR PLAS LUERLOCK TIP N CTRL DISP

## (undated) DEVICE — BLADELESS OBTURATOR: Brand: WECK VISTA

## (undated) DEVICE — SOLUTION ANTIFOG VIS SYS CLEARIFY LAPSCP

## (undated) DEVICE — ELECTRO LUBE IS A SINGLE PATIENT USE DEVICE THAT IS INTENDED TO BE USED ON ELECTROSURGICAL ELECTRODES TO REDUCE STICKING.: Brand: KEY SURGICAL ELECTRO LUBE

## (undated) DEVICE — NEEDLE INSUFFLATION 14 GAX120 MM SURGINEEDLE

## (undated) DEVICE — HYPODERMIC SAFETY NEEDLE: Brand: MAGELLAN

## (undated) DEVICE — ARM DRAPE

## (undated) DEVICE — X-RAY DETECTABLE SPONGES,16 PLY: Brand: VISTEC

## (undated) DEVICE — GOWN,SIRUS,FABRNF,XL,20/CS: Brand: MEDLINE

## (undated) DEVICE — GARMENT,MEDLINE,DVT,INT,CALF,MED, GEN2: Brand: MEDLINE

## (undated) DEVICE — SYSTEM POS SZ 36 X 20 X 1 IN INTEGR ADJ ARM PROTECTOR LIFT